# Patient Record
Sex: FEMALE | Race: BLACK OR AFRICAN AMERICAN | Employment: OTHER | URBAN - METROPOLITAN AREA
[De-identification: names, ages, dates, MRNs, and addresses within clinical notes are randomized per-mention and may not be internally consistent; named-entity substitution may affect disease eponyms.]

---

## 2017-03-15 ENCOUNTER — OFFICE VISIT (OUTPATIENT)
Dept: FAMILY MEDICINE CLINIC | Age: 67
End: 2017-03-15

## 2017-03-15 VITALS
TEMPERATURE: 96 F | BODY MASS INDEX: 37.56 KG/M2 | RESPIRATION RATE: 16 BRPM | WEIGHT: 220 LBS | HEIGHT: 64 IN | HEART RATE: 82 BPM | DIASTOLIC BLOOD PRESSURE: 95 MMHG | SYSTOLIC BLOOD PRESSURE: 172 MMHG | OXYGEN SATURATION: 98 %

## 2017-03-15 DIAGNOSIS — J32.0 LEFT MAXILLARY SINUSITIS: Primary | ICD-10-CM

## 2017-03-15 RX ORDER — VALSARTAN AND HYDROCHLOROTHIAZIDE 80; 12.5 MG/1; MG/1
1 TABLET, FILM COATED ORAL 2 TIMES DAILY
Qty: 60 TAB | Refills: 5 | Status: SHIPPED | OUTPATIENT
Start: 2017-03-15 | End: 2018-06-14 | Stop reason: DRUGHIGH

## 2017-03-15 RX ORDER — AZITHROMYCIN 250 MG/1
TABLET, FILM COATED ORAL
Qty: 6 TAB | Refills: 0 | Status: SHIPPED | OUTPATIENT
Start: 2017-03-15 | End: 2017-03-20

## 2017-03-15 NOTE — MR AVS SNAPSHOT
Visit Information Date & Time Provider Department Dept. Phone Encounter #  
 3/15/2017 10:30 AM Torsten Dalton, [address] [id number] Follow-up Instructions Return if symptoms worsen or fail to improve. Your Appointments 3/30/2017  8:15 AM  
Follow Up with Torsten Dalton MD  
[facility] ([address]) Appt Note: 6 mo f/u  
 Janelleuth Trae. [address]  
  
   
 [address] Upcoming Health Maintenance Date Due DTaP/Tdap/Td series (1 - Tdap) 7/3/1973 Pneumococcal 19-64 Highest Risk (2 of 3 - PCV13) 11/17/2016 PAP AKA CERVICAL CYTOLOGY 10/15/2047* MEDICARE YEARLY EXAM 11/9/2017 BREAST CANCER SCRN MAMMOGRAM 10/13/2018 COLONOSCOPY 11/4/2024 *Topic was postponed. The date shown is not the original due date. Allergies as of 3/15/2017  Review Complete On: 3/15/2017 By: Torsten Dalton MD  
  
 Severity Noted Reaction Type Reactions Codeine  09/08/2016    Other (comments) \"Feel funny\" Penicillin G  09/08/2016    Hives Current Immunizations  Reviewed on 9/29/2016 Name Date Influenza Vaccine (Quad) PF 9/29/2016 Pneumococcal Polysaccharide (PPSV-23) 11/17/2015 Zoster Vaccine, Live 6/11/2013 Not reviewed this visit You Were Diagnosed With   
  
 Codes Comments Left maxillary sinusitis    -  Primary ICD-10-CM: J32.0 ICD-9-CM: 473.0 Vitals BP Pulse Temp Resp Height(growth percentile) Weight(growth percentile) (!) 172/95 82 96 °F (35.6 °C) (Oral) 16 5' 4\" (1.626 m) 220 lb (99.8 kg) SpO2 BMI OB Status Smoking Status 98% 37.76 kg/m2 Hysterectomy Never Smoker BMI and BSA Data Body Mass Index Body Surface Area  
 37.76 kg/m 2 2.12 m 2 Preferred Pharmacy Pharmacy Name Phone  [address], [address] [phone] Your Updated Medication List  
  
   
This list is accurate as of: 3/15/17 11:12 AM.  Always use your most recent med list.  
  
  
  
  
 AYR SALINE 0.65 % nasal spray Generic drug:  sodium chloride 1 Spray as needed for Congestion. azithromycin 250 mg tablet Commonly known as:  Valaria Wernerins Use as per package directions CALCIUM 600 + D 600-125 mg-unit Tab Generic drug:  calcium-cholecalciferol (d3) Take 600 mg by mouth daily. COMBIGAN 0.2-0.5 % Drop ophthalmic solution Generic drug:  brimonidine-timolol Administer 1 Drop to left eye daily. folic acid 1 mg tablet Commonly known as:  Google Take  by mouth daily. guaiFENesin-codeine 100-10 mg/5 mL solution Commonly known as:  ROBITUSSIN AC Take 5 mL by mouth three (3) times daily as needed for Cough. Max Daily Amount: 15 mL. STOOL SOFTENER 100 mg capsule Generic drug:  docusate sodium Take 100 mg by mouth daily. tiZANidine 2 mg tablet Commonly known as:  Twila Blue Take 1 tablet 3 times a day as needed TYLENOL EXTRA STRENGTH 500 mg tablet Generic drug:  acetaminophen Take 500 mg by mouth every four (4) hours as needed for Pain.  
  
 valsartan-hydroCHLOROthiazide 80-12.5 mg per tablet Commonly known as:  DIOVAN-HCT Take 1 Tab by mouth two (2) times a day. VENTOLIN HFA 90 mcg/actuation inhaler Generic drug:  albuterol Take 2 Puffs by inhalation every four (4) hours as needed for Wheezing. XARELTO 20 mg Tab tablet Generic drug:  rivaroxaban Take  by mouth daily. ZANTAC 150 mg tablet Generic drug:  raNITIdine Take  by mouth as needed for Indigestion. ZyrTEC 10 mg Cap Generic drug:  Cetirizine Take 10 mg by mouth as needed. Prescriptions Sent to Pharmacy Refills  
 azithromycin (ZITHROMAX Z-MARYANN) 250 mg tablet 0 Sig: Use as per package directions  Class: Normal  
 Pharmacy: [id number] [address] [place] [address] Ph #: [phone]  
 valsartan-hydroCHLOROthiazide (DIOVAN-HCT) 80-12.5 mg per tablet 5 Sig: Take 1 Tab by mouth two (2) times a day. Class: Normal  
 Pharmacy: [id number] [address] [place] [address] Ph #: [phone] Route: Oral  
  
Follow-up Instructions Return if symptoms worsen or fail to improve. Patient Instructions Please take some Afrin nasal spray (oxymetolazone) for 5 days twice per day. Sinusitis: Care Instructions Your Care Instructions Sinusitis is an infection of the lining of the sinus cavities in your head. Sinusitis often follows a cold. It causes pain and pressure in your head and face. In most cases, sinusitis gets better on its own in 1 to 2 weeks. But some mild symptoms may last for several weeks. Sometimes antibiotics are needed. Follow-up care is a key part of your treatment and safety. Be sure to make and go to all appointments, and call your doctor if you are having problems. It's also a good idea to know your test results and keep a list of the medicines you take. How can you care for yourself at home? · Take an over-the-counter pain medicine, such as acetaminophen (Tylenol), ibuprofen (Advil, Motrin), or naproxen (Aleve). Read and follow all instructions on the label. · If the doctor prescribed antibiotics, take them as directed. Do not stop taking them just because you feel better. You need to take the full course of antibiotics. · Be careful when taking over-the-counter cold or flu medicines and Tylenol at the same time. Many of these medicines have acetaminophen, which is Tylenol. Read the labels to make sure that you are not taking more than the recommended dose. Too much acetaminophen (Tylenol) can be harmful.  
· Breathe warm, moist air from a steamy shower, a hot bath, or a sink filled with hot water. Avoid cold, dry air. Using a humidifier in your home may help. Follow the directions for cleaning the machine. · Use saline (saltwater) nasal washes to help keep your nasal passages open and wash out mucus and bacteria. You can buy saline nose drops at a grocery store or drugstore. Or you can make your own at home by adding 1 teaspoon of salt and 1 teaspoon of baking soda to 2 cups of distilled water. If you make your own, fill a bulb syringe with the solution, insert the tip into your nostril, and squeeze gently. Sidonie Bonnie your nose. · Put a hot, wet towel or a warm gel pack on your face 3 or 4 times a day for 5 to 10 minutes each time. · Try a decongestant nasal spray like oxymetazoline (Afrin). Do not use it for more than 3 days in a row. Using it for more than 3 days can make your congestion worse. When should you call for help? Call your doctor now or seek immediate medical care if: 
· You have new or worse swelling or redness in your face or around your eyes. · You have a new or higher fever. Watch closely for changes in your health, and be sure to contact your doctor if: 
· You have new or worse facial pain. · The mucus from your nose becomes thicker (like pus) or has new blood in it. · You are not getting better as expected. Where can you learn more? Go to http://diego-beatriz.info/. Enter V938 in the search box to learn more about \"Sinusitis: Care Instructions. \" Current as of: July 29, 2016 Content Version: 11.1 © 7466-9836 [employer]. Care instructions adapted under license by [employer] (which disclaims liability or warranty for this information). If you have questions about a medical condition or this instruction, always ask your healthcare professional. [address]  any warranty or liability for your use of this information. Introducing [address]! Dear Sherryle Fire: Thank you for requesting a [employer] account. Our records indicate that you already have an active [employer] account. You can access your account anytime at [email or web address] Did you know that you can access your hospital and ER discharge instructions at any time in [employer]? You can also review all of your test results from your hospital stay or ER visit. Additional Information If you have questions, please visit the Frequently Asked Questions section of the [employer] website at [email or web address] Remember, [employer] is NOT to be used for urgent needs. For medical emergencies, dial 911. Now available from your iPhone and Android! Please provide this summary of care documentation to your next provider. Your primary care clinician is listed as JCARLOS Javier. If you have any questions after today's visit, please call [phone].

## 2017-03-15 NOTE — PROGRESS NOTES
1. Have you been to the ER, urgent care clinic since your last visit? Hospitalized since your last visit? No.     2. Have you seen or consulted any other health care providers outside of the [address] [place] [address] since your last visit? Include any pap smears or colon screening.  No.    Patient presents with wheezing and severe sinus pressure on her left forehead and left eye for about a month

## 2017-03-15 NOTE — PATIENT INSTRUCTIONS
Please take some Afrin nasal spray (oxymetolazone) for 5 days twice per day. Sinusitis: Care Instructions  Your Care Instructions    Sinusitis is an infection of the lining of the sinus cavities in your head. Sinusitis often follows a cold. It causes pain and pressure in your head and face. In most cases, sinusitis gets better on its own in 1 to 2 weeks. But some mild symptoms may last for several weeks. Sometimes antibiotics are needed. Follow-up care is a key part of your treatment and safety. Be sure to make and go to all appointments, and call your doctor if you are having problems. It's also a good idea to know your test results and keep a list of the medicines you take. How can you care for yourself at home? · Take an over-the-counter pain medicine, such as acetaminophen (Tylenol), ibuprofen (Advil, Motrin), or naproxen (Aleve). Read and follow all instructions on the label. · If the doctor prescribed antibiotics, take them as directed. Do not stop taking them just because you feel better. You need to take the full course of antibiotics. · Be careful when taking over-the-counter cold or flu medicines and Tylenol at the same time. Many of these medicines have acetaminophen, which is Tylenol. Read the labels to make sure that you are not taking more than the recommended dose. Too much acetaminophen (Tylenol) can be harmful. · Breathe warm, moist air from a steamy shower, a hot bath, or a sink filled with hot water. Avoid cold, dry air. Using a humidifier in your home may help. Follow the directions for cleaning the machine. · Use saline (saltwater) nasal washes to help keep your nasal passages open and wash out mucus and bacteria. You can buy saline nose drops at a grocery store or drugstore. Or you can make your own at home by adding 1 teaspoon of salt and 1 teaspoon of baking soda to 2 cups of distilled water.  If you make your own, fill a bulb syringe with the solution, insert the tip into your nostril, and squeeze gently. Melvia Creamer your nose. · Put a hot, wet towel or a warm gel pack on your face 3 or 4 times a day for 5 to 10 minutes each time. · Try a decongestant nasal spray like oxymetazoline (Afrin). Do not use it for more than 3 days in a row. Using it for more than 3 days can make your congestion worse. When should you call for help? Call your doctor now or seek immediate medical care if:  · You have new or worse swelling or redness in your face or around your eyes. · You have a new or higher fever. Watch closely for changes in your health, and be sure to contact your doctor if:  · You have new or worse facial pain. · The mucus from your nose becomes thicker (like pus) or has new blood in it. · You are not getting better as expected. Where can you learn more? Go to http://diego-beatriz.info/. Enter C043 in the search box to learn more about \"Sinusitis: Care Instructions. \"  Current as of: July 29, 2016  Content Version: 11.1  © 2748-1005 [employer]. Care instructions adapted under license by [employer] (which disclaims liability or warranty for this information). If you have questions about a medical condition or this instruction, always ask your healthcare professional. [address] any warranty or liability for your use of this information.

## 2017-03-30 ENCOUNTER — OFFICE VISIT (OUTPATIENT)
Dept: FAMILY MEDICINE CLINIC | Age: 67
End: 2017-03-30

## 2017-03-30 VITALS
OXYGEN SATURATION: 99 % | DIASTOLIC BLOOD PRESSURE: 86 MMHG | BODY MASS INDEX: 37.73 KG/M2 | TEMPERATURE: 97 F | HEIGHT: 64 IN | SYSTOLIC BLOOD PRESSURE: 157 MMHG | HEART RATE: 80 BPM | WEIGHT: 221 LBS | RESPIRATION RATE: 16 BRPM

## 2017-03-30 DIAGNOSIS — E66.9 OBESITY (BMI 30-39.9): ICD-10-CM

## 2017-03-30 DIAGNOSIS — D86.9 SARCOIDOSIS: Primary | ICD-10-CM

## 2017-03-30 DIAGNOSIS — Z86.718 PERSONAL HISTORY OF DVT (DEEP VEIN THROMBOSIS): ICD-10-CM

## 2017-03-30 RX ORDER — ALBUTEROL SULFATE 90 UG/1
2 AEROSOL, METERED RESPIRATORY (INHALATION)
Qty: 1 INHALER | Refills: 2 | Status: SHIPPED | OUTPATIENT
Start: 2017-03-30 | End: 2018-06-14 | Stop reason: SDUPTHER

## 2017-03-30 RX ORDER — AMLODIPINE BESYLATE 5 MG/1
5 TABLET ORAL DAILY
Qty: 30 TAB | Refills: 5 | Status: SHIPPED | OUTPATIENT
Start: 2017-03-30 | End: 2017-10-12 | Stop reason: ALTCHOICE

## 2017-03-30 NOTE — PROGRESS NOTES
Mat Davis is a 61 y.o. female and presents with Osteopenia; Blood Clot; and Sarcoidosis       Subjective:    HM- had pneumovax 23 already. TDAP also due  HTN- taking meds. No chest pain or sob. Sarcoidosis- some wheezing. Not using albuterol. Obesity- not working much on this. Assessment/Plan:      1. HM- will plan to do Prevnar 13 at next visit after pt turns 74. -TDAP at local pharmacy to be sure it is covered. 2. Obesity- encouraged to continue work on this. Caloric restriction to 1 lb/week. 3. Sarcoidosis- recent CT with somewhat worse thickening of fibrosis but no new areas- referral to Pulmonary (Dr. Nara Lauren from the past) also PFT's ordered. Discussed use of rescue inhaler. Seems to have upper airway sx primarily today- no wheezes on exam.   4. HTN- not controlled. Adding Norvasc. DASH diet given       RTC in 1 month   Orders Placed This Encounter    REFERRAL TO PULMONARY DISEASE     Referral Priority:   Routine     Referral Type:   Consultation     Referral Reason:   Specialty Services Required    PULMONARY FUNCTION TEST     Standing Status:   Future     Standing Expiration Date:   9/30/2017     Scheduling Instructions: With and without bronchodilator. Pt requests [facility]    albuterol (PROVENTIL HFA, VENTOLIN HFA, PROAIR HFA) 90 mcg/actuation inhaler     Sig: Take 2 Puffs by inhalation every six (6) hours as needed for Wheezing. Dispense:  1 Inhaler     Refill:  2    amLODIPine (NORVASC) 5 mg tablet     Sig: Take 1 Tab by mouth daily. Dispense:  27 Tab     Refill:  5   Latosha was seen today for osteopenia, blood clot and sarcoidosis. Diagnoses and all orders for this visit:    Sarcoidosis ([facility] [address]  -     albuterol (PROVENTIL HFA, VENTOLIN HFA, PROAIR HFA) 90 mcg/actuation inhaler; Take 2 Puffs by inhalation every six (6) hours as needed for Wheezing.  -     PULMONARY FUNCTION TEST; Future  -     REFERRAL TO PULMONARY DISEASE    Obesity (BMI 30-39. 9)    Personal history of DVT (deep vein thrombosis)    Other orders  -     amLODIPine (NORVASC) 5 mg tablet; Take 1 Tab by mouth daily. ROS:  Negative except as mentioned above  Cardiac- no chest pain or palpitations  Pulmonary- no sob or wheezes  GI- no n/v or diarrhea. SH:  Social History   Substance Use Topics    Smoking status: Never Smoker    Smokeless tobacco: None    Alcohol use No         Medications/Allergies:  Current Outpatient Prescriptions on File Prior to Visit   Medication Sig Dispense Refill    valsartan-hydroCHLOROthiazide (DIOVAN-HCT) 80-12.5 mg per tablet Take 1 Tab by mouth two (2) times a day. 60 Tab 5    guaiFENesin-codeine (ROBITUSSIN AC) 100-10 mg/5 mL solution Take 5 mL by mouth three (3) times daily as needed for Cough. Max Daily Amount: 15 mL. 180 mL 0    acetaminophen (TYLENOL EXTRA STRENGTH) 500 mg tablet Take 500 mg by mouth every four (4) hours as needed for Pain.  rivaroxaban (XARELTO) 20 mg tab tablet Take  by mouth daily.  folic acid (FOLVITE) 1 mg tablet Take  by mouth daily.  albuterol (VENTOLIN HFA) 90 mcg/actuation inhaler Take 2 Puffs by inhalation every four (4) hours as needed for Wheezing.  brimonidine-timolol (COMBIGAN) 0.2-0.5 % drop ophthalmic solution Administer 1 Drop to left eye daily.  ranitidine (ZANTAC) 150 mg tablet Take  by mouth as needed for Indigestion.  Cetirizine (ZYRTEC) 10 mg cap Take 10 mg by mouth as needed.  calcium-cholecalciferol, d3, (CALCIUM 600 + D) 600-125 mg-unit tab Take 600 mg by mouth daily.  docusate sodium (STOOL SOFTENER) 100 mg capsule Take 100 mg by mouth daily.  sodium chloride (AYR SALINE) 0.65 % nasal spray 1 Spray as needed for Congestion.  tiZANidine (ZANAFLEX) 2 mg tablet Take 1 tablet 3 times a day as needed 30 Tab 0     No current facility-administered medications on file prior to visit.            Allergies   Allergen Reactions    Codeine Other (comments)     \"Feel funny\"    Penicillin NATASHA Shea       Objective:  Visit Vitals    /86    Pulse 80    Temp 97 °F (36.1 °C) (Oral)    Resp 16    Ht 5' 4\" (1.626 m)    Wt 221 lb (100.2 kg)    SpO2 99%    BMI 37.93 kg/m2    Body mass index is 37.93 kg/(m^2). Constitutional: Well developed, nourished, no distress, alert, obese   CV: S1, S2.  RRR. No murmurs/rubs. No thrills palpated. No carotid bruits. Intact distal pulses. No edema. Pulm: No abnormalities on inspection. Clear to auscultation bilaterally. No wheezing/rhonchi. Normal effort. GI: Soft, nontender, nondistended. Normal active bowel sounds. No  masses on palpation. No hepatosplenomegaly.

## 2017-03-30 NOTE — MR AVS SNAPSHOT
Visit Information Date & Time Provider Department Dept. Phone Encounter #  
 3/30/2017  8:15 AM Blaze Khan, [address] [facility] [phone] [id number] Follow-up Instructions Return in about 4 weeks (around 4/27/2017). Upcoming Health Maintenance Date Due DTaP/Tdap/Td series (1 - Tdap) 7/3/1973 Pneumococcal 19-64 Highest Risk (2 of 3 - PCV13) 11/17/2016 PAP AKA CERVICAL CYTOLOGY 10/15/2047* MEDICARE YEARLY EXAM 11/9/2017 BREAST CANCER SCRN MAMMOGRAM 10/13/2018 COLONOSCOPY 11/4/2024 *Topic was postponed. The date shown is not the original due date. Allergies as of 3/30/2017  Review Complete On: 3/30/2017 By: Blaze Khan MD  
  
 Severity Noted Reaction Type Reactions Codeine  09/08/2016    Other (comments) \"Feel funny\" Penicillin G  09/08/2016    Hives Current Immunizations  Reviewed on 3/30/2017 Name Date Influenza Vaccine (Quad) PF 9/29/2016 Pneumococcal Polysaccharide (PPSV-23) 11/17/2015 Zoster Vaccine, Live 6/11/2013 Reviewed by Blaze Khan MD on 3/30/2017 at  8:36 AM  
You Were Diagnosed With   
  
 Codes Comments Sarcoidosis ([facility].)    -  Primary ICD-10-CM: S43.9 ICD-9-CM: 135 Obesity (BMI 30-39. 9)     ICD-10-CM: E66.9 ICD-9-CM: 278.00 Personal history of DVT (deep vein thrombosis)     ICD-10-CM: W13.332 ICD-9-CM: V12.51 Vitals BP Pulse Temp Resp Height(growth percentile) Weight(growth percentile) 157/86 80 97 °F (36.1 °C) (Oral) 16 5' 4\" (1.626 m) 221 lb (100.2 kg) SpO2 BMI OB Status Smoking Status 99% 37.93 kg/m2 Hysterectomy Never Smoker Vitals History BMI and BSA Data Body Mass Index Body Surface Area  
 37.93 kg/m 2 2.13 m 2 Preferred Pharmacy Pharmacy Name Phone [facility] [address] [phone] Your Updated Medication List  
  
   
 This list is accurate as of: 3/30/17  9:01 AM.  Always use your most recent med list.  
  
  
  
  
 albuterol 90 mcg/actuation inhaler Commonly known as:  PROVENTIL HFA, VENTOLIN HFA, PROAIR HFA Take 2 Puffs by inhalation every six (6) hours as needed for Wheezing. amLODIPine 5 mg tablet Commonly known as:  Joselo Presser Take 1 Tab by mouth daily. AYR SALINE 0.65 % nasal spray Generic drug:  sodium chloride 1 Spray as needed for Congestion. CALCIUM 600 + D 600-125 mg-unit Tab Generic drug:  calcium-cholecalciferol (d3) Take 600 mg by mouth daily. COMBIGAN 0.2-0.5 % Drop ophthalmic solution Generic drug:  brimonidine-timolol Administer 1 Drop to left eye daily. folic acid 1 mg tablet Commonly known as:  Google Take  by mouth daily. STOOL SOFTENER 100 mg capsule Generic drug:  docusate sodium Take 100 mg by mouth daily. tiZANidine 2 mg tablet Commonly known as:  Lamont Sleek Take 1 tablet 3 times a day as needed TYLENOL EXTRA STRENGTH 500 mg tablet Generic drug:  acetaminophen Take 500 mg by mouth every four (4) hours as needed for Pain.  
  
 valsartan-hydroCHLOROthiazide 80-12.5 mg per tablet Commonly known as:  DIOVAN-HCT Take 1 Tab by mouth two (2) times a day. XARELTO 20 mg Tab tablet Generic drug:  rivaroxaban Take  by mouth daily. ZANTAC 150 mg tablet Generic drug:  raNITIdine Take  by mouth as needed for Indigestion. ZyrTEC 10 mg Cap Generic drug:  Cetirizine Take 10 mg by mouth as needed. Prescriptions Sent to Pharmacy Refills  
 albuterol (PROVENTIL HFA, VENTOLIN HFA, PROAIR HFA) 90 mcg/actuation inhaler 2 Sig: Take 2 Puffs by inhalation every six (6) hours as needed for Wheezing. Class: Normal  
 Pharmacy: [id number] [place] [address] Ph #: [phone]  Route: Inhalation  
 amLODIPine (NORVASC) 5 mg tablet 5  
 Sig: Take 1 Tab by mouth daily. Class: Normal  
 Pharmacy: [id number] [address] Ph #: [phone] Route: Oral  
  
We Performed the Following REFERRAL TO PULMONARY DISEASE [NMX82 Custom] Comments:  
 Please evaluate patient for sarcoidosis Follow-up Instructions Return in about 4 weeks (around 4/27/2017). To-Do List   
 03/30/2017 PFT:  PULMONARY FUNCTION TEST Referral Information Referral ID Referred By Referred To  
  
 [id number] ADALID Pollock Not Available Visits Status Start Date End Date 1 New Request 3/30/17 3/30/18 If your referral has a status of pending review or denied, additional information will be sent to support the outcome of this decision. Patient Instructions Please ask your local pharmacy for a TDAP (tetanus with whooping cough) booster. DASH Diet: Care Instructions Your Care Instructions The DASH diet is an eating plan that can help lower your blood pressure. DASH stands for Dietary Approaches to Stop Hypertension. Hypertension is high blood pressure. The DASH diet focuses on eating foods that are high in calcium, potassium, and magnesium. These nutrients can lower blood pressure. The foods that are highest in these nutrients are fruits, vegetables, low-fat dairy products, nuts, seeds, and legumes. But taking calcium, potassium, and magnesium supplements instead of eating foods that are high in those nutrients does not have the same effect. The DASH diet also includes whole grains, fish, and poultry. The DASH diet is one of several lifestyle changes your doctor may recommend to lower your high blood pressure. Your doctor may also want you to decrease the amount of sodium in your diet. Lowering sodium while following the DASH diet can lower blood pressure even further than just the DASH diet alone. Follow-up care is a key part of your treatment and safety.  Be sure to make and go to all appointments, and call your doctor if you are having problems. It's also a good idea to know your test results and keep a list of the medicines you take. How can you care for yourself at home? Following the DASH diet · Eat 4 to 5 servings of fruit each day. A serving is 1 medium-sized piece of fruit, ½ cup chopped or canned fruit, 1/4 cup dried fruit, or 4 ounces (½ cup) of fruit juice. Choose fruit more often than fruit juice. · Eat 4 to 5 servings of vegetables each day. A serving is 1 cup of lettuce or raw leafy vegetables, ½ cup of chopped or cooked vegetables, or 4 ounces (½ cup) of vegetable juice. Choose vegetables more often than vegetable juice. · Get 2 to 3 servings of low-fat and fat-free dairy each day. A serving is 8 ounces of milk, 1 cup of yogurt, or 1 ½ ounces of cheese. · Eat 6 to 8 servings of grains each day. A serving is 1 slice of bread, 1 ounce of dry cereal, or ½ cup of cooked rice, pasta, or cooked cereal. Try to choose whole-grain products as much as possible. · Limit lean meat, poultry, and fish to 2 servings each day. A serving is 3 ounces, about the size of a deck of cards. · Eat 4 to 5 servings of nuts, seeds, and legumes (cooked dried beans, lentils, and split peas) each week. A serving is 1/3 cup of nuts, 2 tablespoons of seeds, or ½ cup of cooked beans or peas. · Limit fats and oils to 2 to 3 servings each day. A serving is 1 teaspoon of vegetable oil or 2 tablespoons of salad dressing. · Limit sweets and added sugars to 5 servings or less a week. A serving is 1 tablespoon jelly or jam, ½ cup sorbet, or 1 cup of lemonade. · Eat less than 2,300 milligrams (mg) of sodium a day. If you limit your sodium to 1,500 mg a day, you can lower your blood pressure even more. Tips for success · Start small. Do not try to make dramatic changes to your diet all at once.  You might feel that you are missing out on your favorite foods and then be more likely to not follow the plan. Make small changes, and stick with them. Once those changes become habit, add a few more changes. · Try some of the following: ¨ Make it a goal to eat a fruit or vegetable at every meal and at snacks. This will make it easy to get the recommended amount of fruits and vegetables each day. ¨ Try yogurt topped with fruit and nuts for a snack or healthy dessert. ¨ Add lettuce, tomato, cucumber, and onion to sandwiches. ¨ Combine a ready-made pizza crust with low-fat mozzarella cheese and lots of vegetable toppings. Try using tomatoes, squash, spinach, broccoli, carrots, cauliflower, and onions. ¨ Have a variety of cut-up vegetables with a low-fat dip as an appetizer instead of chips and dip. ¨ Sprinkle sunflower seeds or chopped almonds over salads. Or try adding chopped walnuts or almonds to cooked vegetables. ¨ Try some vegetarian meals using beans and peas. Add garbanzo or kidney beans to salads. Make burritos and tacos with mashed guzman beans or black beans. Where can you learn more? Go to http://diego-beatriz.info/. Enter F993 in the search box to learn more about \"DASH Diet: Care Instructions. \" Current as of: March 23, 2016 Content Version: 11.2 © 3410-2529 Realm. Care instructions adapted under license by Daishu.[email or web address] (which disclaims liability or warranty for this information). If you have questions about a medical condition or this instruction, always ask your healthcare professional. [place] [address] any warranty or liability for your use of this information. Mediterranean Diet: Care Instructions Your Care Instructions The Mediterranean diet features foods eaten in [place], [place], [place] and [place], and other countries that border the [facility]. It emphasizes eating a diet rich in fruits, vegetables, nuts, and high-fiber grains, and limits meat, cheese, and sweets. The Mediterranean diet may: · Prevent heart disease and lower the risk of a heart attack or stroke. · Prevent type 2 diabetes. · Prevent Alzheimer's disease and other dementia. · Prevent depression. · Prevent Parkinson's disease. This diet contains more fat than other heart-healthy diets. But the fats are mainly from nuts, unsaturated oils, such as fish oils, olive oil, and certain nut or seed oils (such as canola, soybean, or flaxseed oil). These types of oils may help protect the heart and blood vessels. Follow-up care is a key part of your treatment and safety. Be sure to make and go to all appointments, and call your doctor if you are having problems. It's also a good idea to know your test results and keep a list of the medicines you take. How can you care for yourself at home? What to eat · Eat a variety of fruits and vegetables each day, such as grapes, blueberries, tomatoes, broccoli, peppers, figs, olives, spinach, eggplant, beans, lentils, and chickpeas. · Eat a variety of whole-grain foods each day, such as oats, brown rice, and whole wheat bread, pasta, and couscous. · Eat fish at least 2 times a week. Try tuna, salmon, mackerel, lake trout, herring, or sardines. · Eat moderate amounts of low-fat dairy products, such as milk, cheese, or yogurt. · Eat moderate amounts of poultry and eggs. · Choose healthy (unsaturated) fats, such as nuts, olive oil and certain nut or seed oils like canola, soybean, and flaxseed. · Limit unhealthy (saturated) fats, such as butter, palm oil, and coconut oil. And limit fats found in animal products, such as meat and dairy products made with whole milk. Try to eat red meat only a few times a month in very small amounts. · Limit sweets and desserts to only a few times a week. This includes sugar-sweetened drinks like soda. The Mediterranean diet may also include red wine with your meal1 glass each day for women and up to 2 glasses a day for men. Tips for changing your diet · Dip bread in a mix of olive oil and fresh herbs instead of using butter. · Add avocado slices to your sandwich instead of pham. · Have fish for lunch or dinner instead of red meat. Brush the fish with olive oil, and broil or grill it. · Sprinkle your salad with seeds or nuts instead of cheese. · Cook with olive or canola oil instead of butter or oils that are high in saturated fat. · Switch from 2% milk or whole milk to 1% or fat-free milk. · Dip raw vegetables in a vinaigrette dressing or hummus instead of dips made from mayonnaise or sour cream. 
· Have a piece of fruit for dessert instead of a piece of cake. Try baked apples, or have some dried fruit. Part of the Mediterranean diet is being active. Get at least 30 minutes of exercise on most days of the week. Walking is a good choice. You also may want to do other activities, such as running, swimming, cycling, or playing tennis or team sports. Where can you learn more? Go to http://diego-beatriz.info/. Enter O407 in the search box to learn more about \"Mediterranean Diet: Care Instructions. \" Current as of: October 21, 2016 Content Version: 11.2 © 3544-1653 Guidesly, Best Bid. Care instructions adapted under license by BioGener[employer] (which disclaims liability or warranty for this information). If you have questions about a medical condition or this instruction, always ask your healthcare professional. [place] [address] any warranty or liability for your use of this information. Introducing [facility]! Dear Naomi Landeros: Thank you for requesting a Drip In account. Our records indicate that you already have an active Drip In account. You can access your account anytime at [email or web address] Did you know that you can access your hospital and ER discharge instructions at any time in Drip In?   You can also review all of your test results from your hospital stay or ER visit. Additional Information If you have questions, please visit the Frequently Asked Questions section of the [employer] website at [email or web address] Remember, [employer] is NOT to be used for urgent needs. For medical emergencies, dial 911. Now available from your iPhone and Android! Please provide this summary of care documentation to your next provider. Your primary care clinician is listed as JCARLOS Javier. If you have any questions after today's visit, please call [phone].

## 2017-03-30 NOTE — PATIENT INSTRUCTIONS
Please ask your local pharmacy for a TDAP (tetanus with whooping cough) booster. DASH Diet: Care Instructions  Your Care Instructions  The DASH diet is an eating plan that can help lower your blood pressure. DASH stands for Dietary Approaches to Stop Hypertension. Hypertension is high blood pressure. The DASH diet focuses on eating foods that are high in calcium, potassium, and magnesium. These nutrients can lower blood pressure. The foods that are highest in these nutrients are fruits, vegetables, low-fat dairy products, nuts, seeds, and legumes. But taking calcium, potassium, and magnesium supplements instead of eating foods that are high in those nutrients does not have the same effect. The DASH diet also includes whole grains, fish, and poultry. The DASH diet is one of several lifestyle changes your doctor may recommend to lower your high blood pressure. Your doctor may also want you to decrease the amount of sodium in your diet. Lowering sodium while following the DASH diet can lower blood pressure even further than just the DASH diet alone. Follow-up care is a key part of your treatment and safety. Be sure to make and go to all appointments, and call your doctor if you are having problems. It's also a good idea to know your test results and keep a list of the medicines you take. How can you care for yourself at home? Following the DASH diet  · Eat 4 to 5 servings of fruit each day. A serving is 1 medium-sized piece of fruit, ½ cup chopped or canned fruit, 1/4 cup dried fruit, or 4 ounces (½ cup) of fruit juice. Choose fruit more often than fruit juice. · Eat 4 to 5 servings of vegetables each day. A serving is 1 cup of lettuce or raw leafy vegetables, ½ cup of chopped or cooked vegetables, or 4 ounces (½ cup) of vegetable juice. Choose vegetables more often than vegetable juice. · Get 2 to 3 servings of low-fat and fat-free dairy each day.  A serving is 8 ounces of milk, 1 cup of yogurt, or 1 ½ ounces of cheese. · Eat 6 to 8 servings of grains each day. A serving is 1 slice of bread, 1 ounce of dry cereal, or ½ cup of cooked rice, pasta, or cooked cereal. Try to choose whole-grain products as much as possible. · Limit lean meat, poultry, and fish to 2 servings each day. A serving is 3 ounces, about the size of a deck of cards. · Eat 4 to 5 servings of nuts, seeds, and legumes (cooked dried beans, lentils, and split peas) each week. A serving is 1/3 cup of nuts, 2 tablespoons of seeds, or ½ cup of cooked beans or peas. · Limit fats and oils to 2 to 3 servings each day. A serving is 1 teaspoon of vegetable oil or 2 tablespoons of salad dressing. · Limit sweets and added sugars to 5 servings or less a week. A serving is 1 tablespoon jelly or jam, ½ cup sorbet, or 1 cup of lemonade. · Eat less than 2,300 milligrams (mg) of sodium a day. If you limit your sodium to 1,500 mg a day, you can lower your blood pressure even more. Tips for success  · Start small. Do not try to make dramatic changes to your diet all at once. You might feel that you are missing out on your favorite foods and then be more likely to not follow the plan. Make small changes, and stick with them. Once those changes become habit, add a few more changes. · Try some of the following:  ¨ Make it a goal to eat a fruit or vegetable at every meal and at snacks. This will make it easy to get the recommended amount of fruits and vegetables each day. ¨ Try yogurt topped with fruit and nuts for a snack or healthy dessert. ¨ Add lettuce, tomato, cucumber, and onion to sandwiches. ¨ Combine a ready-made pizza crust with low-fat mozzarella cheese and lots of vegetable toppings. Try using tomatoes, squash, spinach, broccoli, carrots, cauliflower, and onions. ¨ Have a variety of cut-up vegetables with a low-fat dip as an appetizer instead of chips and dip. ¨ Sprinkle sunflower seeds or chopped almonds over salads.  Or try adding chopped walnuts or almonds to cooked vegetables. ¨ Try some vegetarian meals using beans and peas. Add garbanzo or kidney beans to salads. Make burritos and tacos with mashed guzman beans or black beans. Where can you learn more? Go to http://diego-beatriz.info/. Enter M280 in the search box to learn more about \"DASH Diet: Care Instructions. \"  Current as of: March 23, 2016  Content Version: 11.2  © 5815-4732 Lastline. Care instructions adapted under license by Par[employer] (which disclaims liability or warranty for this information). If you have questions about a medical condition or this instruction, always ask your healthcare professional. [address] any warranty or liability for your use of this information. Mediterranean Diet: Care Instructions  Your Care Instructions  The Mediterranean diet features foods eaten in [place], [place], [place] and [place], and other countries that border the [facility]. It emphasizes eating a diet rich in fruits, vegetables, nuts, and high-fiber grains, and limits meat, cheese, and sweets. The Mediterranean diet may:  · Prevent heart disease and lower the risk of a heart attack or stroke. · Prevent type 2 diabetes. · Prevent Alzheimer's disease and other dementia. · Prevent depression. · Prevent Parkinson's disease. This diet contains more fat than other heart-healthy diets. But the fats are mainly from nuts, unsaturated oils, such as fish oils, olive oil, and certain nut or seed oils (such as canola, soybean, or flaxseed oil). These types of oils may help protect the heart and blood vessels. Follow-up care is a key part of your treatment and safety. Be sure to make and go to all appointments, and call your doctor if you are having problems. It's also a good idea to know your test results and keep a list of the medicines you take. How can you care for yourself at home?   What to eat  · Eat a variety of fruits and vegetables each day, such as grapes, blueberries, tomatoes, broccoli, peppers, figs, olives, spinach, eggplant, beans, lentils, and chickpeas. · Eat a variety of whole-grain foods each day, such as oats, brown rice, and whole wheat bread, pasta, and couscous. · Eat fish at least 2 times a week. Try tuna, salmon, mackerel, lake trout, herring, or sardines. · Eat moderate amounts of low-fat dairy products, such as milk, cheese, or yogurt. · Eat moderate amounts of poultry and eggs. · Choose healthy (unsaturated) fats, such as nuts, olive oil and certain nut or seed oils like canola, soybean, and flaxseed. · Limit unhealthy (saturated) fats, such as butter, palm oil, and coconut oil. And limit fats found in animal products, such as meat and dairy products made with whole milk. Try to eat red meat only a few times a month in very small amounts. · Limit sweets and desserts to only a few times a week. This includes sugar-sweetened drinks like soda. The Mediterranean diet may also include red wine with your meal1 glass each day for women and up to 2 glasses a day for men. Tips for changing your diet  · Dip bread in a mix of olive oil and fresh herbs instead of using butter. · Add avocado slices to your sandwich instead of pham. · Have fish for lunch or dinner instead of red meat. Brush the fish with olive oil, and broil or grill it. · Sprinkle your salad with seeds or nuts instead of cheese. · Cook with olive or canola oil instead of butter or oils that are high in saturated fat. · Switch from 2% milk or whole milk to 1% or fat-free milk. · Dip raw vegetables in a vinaigrette dressing or hummus instead of dips made from mayonnaise or sour cream.  · Have a piece of fruit for dessert instead of a piece of cake. Try baked apples, or have some dried fruit. Part of the Mediterranean diet is being active. Get at least 30 minutes of exercise on most days of the week. Walking is a good choice.  You also may want to do other activities, such as running, swimming, cycling, or playing tennis or team sports. Where can you learn more? Go to http://diego-beatriz.info/. Enter O407 in the search box to learn more about \"Mediterranean Diet: Care Instructions. \"  Current as of: October 21, 2016  Content Version: 11.2  © 0855-3836 [employer] (TUGG). Care instructions adapted under license by [employer]ice (which disclaims liability or warranty for this information). If you have questions about a medical condition or this instruction, always ask your healthcare professional. [place] [address] any warranty or liability for your use of this information.

## 2017-03-30 NOTE — PROGRESS NOTES
1. Have you been to the ER, urgent care clinic since your last visit? Hospitalized since your last visit? No.     2. Have you seen or consulted any other health care providers outside of the [facility] since your last visit? Include any pap smears or colon screening. No.    Patient presents with follow up osteopenia, DVT and sarcoidosis.

## 2017-05-18 ENCOUNTER — OFFICE VISIT (OUTPATIENT)
Dept: FAMILY MEDICINE CLINIC | Age: 67
End: 2017-05-18

## 2017-05-18 VITALS
HEART RATE: 85 BPM | TEMPERATURE: 98.2 F | BODY MASS INDEX: 38.07 KG/M2 | SYSTOLIC BLOOD PRESSURE: 143 MMHG | RESPIRATION RATE: 16 BRPM | DIASTOLIC BLOOD PRESSURE: 82 MMHG | WEIGHT: 223 LBS | HEIGHT: 64 IN

## 2017-05-18 DIAGNOSIS — J45.21 MILD INTERMITTENT ASTHMA WITH ACUTE EXACERBATION: Primary | ICD-10-CM

## 2017-05-18 RX ORDER — BUDESONIDE AND FORMOTEROL FUMARATE DIHYDRATE 80; 4.5 UG/1; UG/1
2 AEROSOL RESPIRATORY (INHALATION) 2 TIMES DAILY
Qty: 1 INHALER | Refills: 1 | Status: SHIPPED | OUTPATIENT
Start: 2017-05-18 | End: 2018-06-14 | Stop reason: SDUPTHER

## 2017-05-18 RX ORDER — AZITHROMYCIN 250 MG/1
TABLET, FILM COATED ORAL
Qty: 6 TAB | Refills: 0 | Status: SHIPPED | OUTPATIENT
Start: 2017-05-18 | End: 2017-09-07 | Stop reason: ALTCHOICE

## 2017-05-18 RX ORDER — CODEINE PHOSPHATE AND GUAIFENESIN 10; 100 MG/5ML; MG/5ML
5 SOLUTION ORAL
Qty: 118 BOTTLE | Refills: 0 | Status: SHIPPED | OUTPATIENT
Start: 2017-05-18 | End: 2017-09-07 | Stop reason: ALTCHOICE

## 2017-05-18 NOTE — PATIENT INSTRUCTIONS
Budesonide/Formoterol (By breathing)   Budesonide (ndj-SWU-fm-nide), Formoterol Fumarate (for-ANN-ter-ol FUE-ma-rate)  Treats asthma and chronic obstructive pulmonary disease (COPD). This medicine contains a corticosteroid. Brand Name(s): Symbicort 160/4.5, Symbicort 80/4.5   There may be other brand names for this medicine. When This Medicine Should Not Be Used: You should not use this medicine if you have had an allergic reaction to budesonide or formoterol. You should not use this medicine if your asthma attack has already started, or if you are having a severe asthma attack or COPD flare-up. How to Use This Medicine:   Liquid Under Pressure, Powder  · Your doctor will tell you how much medicine to use. Do not use more than directed. · This medicine should come with a Medication Guide. Ask your pharmacist for a copy if you do not have one. · Test spray in the air before using for the first time or if the inhaler has not been used for a while. · Shake well for 5 seconds before using. You must keep track of the number of puffs you use. Use the dose tracker card to do this. Throw away the inhaler after you have used the number of puffs allowed, or if it is 3 months since you opened the foil pouch. · When you have finished all your inhalations, rinse your mouth out with water. Do not swallow the water after rinsing. If a dose is missed:   · Take a dose as soon as you remember. If it is almost time for your next dose, wait until then and take a regular dose. Do not take extra medicine to make up for a missed dose. How to Store and Dispose of This Medicine:   · Store the canister at room temperature, away from heat and direct light. Do not freeze. Do not keep this medicine inside a car where it could be exposed to extreme heat or cold. Do not poke holes in the canister or throw it into a fire, even if the canister is empty. Store the inhaler with the mouthpiece down.   · Ask your pharmacist, doctor, or health caregiver about the best way to dispose of the used medicine container and any leftover medicine. You will also need to throw away old medicine after the expiration date has passed. · Keep all medicine out of the reach of children. Never share your medicine with anyone. Drugs and Foods to Avoid:   Ask your doctor or pharmacist before using any other medicine, including over-the-counter medicines, vitamins, and herbal products. · Make sure your doctor knows if you are also using clarithromycin (Biaxin®), erythromycin (PCE®), itraconazole (Sporanox®), ketoconazole (Page Emeliois), telithromycin Chastity Curiel), medicine to treat HIV infection (such as atazanavir, indinavir, nelfinavir, ritonavir, saquinavir, Crixivan®, Fotovase®, Invirase®, Norvir®, or Reyataz®), blood pressure medicines (such as atenolol, labetalol, Inderal®, Tenormin®, or Toprol®), or diuretics or \"water pills\" (such as furosemide, hydrochlorothiazide [HCTZ], or Lasix®). Tell your doctor if you are taking a medicine for depression or took a depression medicine in the past 2 weeks such as amitriptyline, doxepin, nefazodone, Elavil®, Eldepryl®, Marplan®, Nardil®, Pamelor®, Parnate®, or Sinequan®. Warnings While Using This Medicine:   · Make sure your doctor knows if you are pregnant or breastfeeding, or if you have liver disease, bone problems (such as osteoporosis), heart or blood vessel disease, heart rhythm problems, high blood pressure, seizures, thyroid problems, diabetes, any kind of infection (especially tuberculosis or herpes infection of the eye), low potassium in the blood, or if you have a weakened immune system. Tell your doctor if you have eye problems (such as a cataract or glaucoma). · Although this medicine decreases the number of asthma episodes, it may increase the chances of a severe asthma episode when they do occur. Talk to your doctor about any questions or concerns that you have.   · This should not be the first and only medicine you use for asthma or COPD. This medicine will not stop an asthma attack that has already started. Your doctor may prescribe another medicine for you to use in case of an acute asthma attack or an acute COPD flare-up. If the other medicine does not work as well, tell your doctor right away. · Take all of your COPD medicines as your doctor ordered. If you use any type of corticosteroid medicine to control your breathing, keep using it as ordered by your doctor. Do not change your doses or stop using your medicines without asking your doctor. · Do not use any other asthma medicine or medicine for breathing problems without talking to your doctor. This medicine should not be used with arformoterol, formoterol, salmeterol, Brovana®, Perforomist, or Serevent® inhalers. · If any of your asthma medicines do not seem to be working as well as usual, call your doctor right away. Do not change your doses or stop using your medicines without asking your doctor. · Call your doctor if your symptoms do not improve or if they get worse. · You may get infections more easily while using this medicine. Avoid people who are sick or have infections. Tell your doctor right away if you have been exposed to someone with chickenpox or measles. · This medicine may cause a fungus infection of the mouth or throat (thrush). Tell your doctor right away if you have white patches in the mouth or throat; or pain when eating or swallowing. · Patients with COPD may be more likely to have pneumonia when taking this medicine. Check with your doctor if you start having an increased sputum (spit) production, change in sputum color, fever, chills, increased cough, or an increase in breathing problems. · Using too much of this medicine or using it for a long time may increase your risk of having adrenal gland problems.  Talk to your doctor if you have more than one of these symptoms while you are using this medicine: darkening of the skin; diarrhea; dizziness; fainting; loss of appetite; mental depression; nausea; skin rash; unusual tiredness or weakness; or vomiting. · This medicine may cause paradoxical bronchospasm, which means your breathing or wheezing will get worse. Paradoxical bronchospasm may be life-threatening. Stop using this medicine and check with your doctor right away if you have coughing, difficulty breathing, shortness of breath, or wheezing after using this medicine. · If you or your child develop a skin rash, hives, or any allergic reaction to this medicine, stop using the medicine and check with your doctor as soon as possible. · This medicine may decrease bone mineral density when used for a long time. A low bone mineral density can cause weak bones or osteoporosis. If you have any questions about this, ask your doctor. · This medicine may cause children to grow more slowly than usual. Talk to your child's doctor if you have any concerns. · Check with your doctor right away if you or your child have blurred vision, difficulty with reading, or any other change in vision while using this medicine. Your doctor may want you to have your eyes checked by an ophthalmologist (eye doctor). Be sure to keep all appointments. · This medicine may affect blood sugar and potassium levels. If you have heart disease or are diabetic and notice a change in the results of your blood or urine sugar or potassium tests, check with your doctor. · Your doctor may want you to carry a medical identification (ID) card stating that you are using this medicine. The card will say that you or your child may need additional medicine during an emergency, a severe asthma attack or other illness, or unusual stress. · Your doctor will check your progress and the effects of this medicine at regular visits. Keep all appointments.   Possible Side Effects While Using This Medicine:   Call your doctor right away if you notice any of these side effects:  · Allergic reaction: Itching or hives, swelling in your face or hands, swelling or tingling in your mouth or throat, chest tightness, trouble breathing  · Changes in vision. · Chest pain, shortness of breath, troubled breathing, tightness in the chest, or wheezing. · Dry mouth, increased thirst, or muscle cramps. · Fast, pounding, or uneven heartbeat. · Fever, chills, cough, runny or stuffy nose, sore throat, and body aches. · Lightheadedness, dizziness, or fainting. · Seizures or tremors. · Sores or white patches on your lips, mouth, or throat. If you notice these less serious side effects, talk with your doctor:   · Anxiety, nervousness, or restlessness. · Headache. · Nausea, vomiting, diarrhea, upset stomach, or stomach pain. · Skin rash. If you notice other side effects that you think are caused by this medicine, tell your doctor. Call your doctor for medical advice about side effects. You may report side effects to FDA at 7-626-FDA-2706  © 2017 [facility] Information is for End User's use only and may not be sold, redistributed or otherwise used for commercial purposes. The above information is an  only. It is not intended as medical advice for individual conditions or treatments. Talk to your doctor, nurse or pharmacist before following any medical regimen to see if it is safe and effective for you. Be sure to only take symbicort as directed. Wash your mouth out after using.

## 2017-05-18 NOTE — PROGRESS NOTES
Eleazar Gonzalez is a 61 y.o. female and presents with Wheezing; Cough; Shortness of Breath; and Cold Symptoms       Subjective:    Asthma flare- wheezing and sob for last week. Worsening. Rhinorrhea and eye watering. Coughing- dry. Some chills and woke with a sweat one night. Assessment/Plan:    Mikaela Rubio was seen today for wheezing, cough, shortness of breath and cold symptoms. Diagnoses and all orders for this visit:    Mild intermittent asthma with acute exacerbation- will rx z pack and explained symbicort use with peak flow meter. Pt will return if not improving. Other orders  -     budesonide-formoterol (SYMBICORT) 80-4.5 mcg/actuation HFAA inhaler; Take 2 Puffs by inhalation two (2) times a day. Use for 2 weeks at a time. -     azithromycin (ZITHROMAX) 250 mg tablet; Take two tablets today then one tablet daily  -     Peak Flow Meter lanette; 1 Units by Does Not Apply route daily.  -     guaiFENesin-codeine (ROBITUSSIN AC) 100-10 mg/5 mL solution; Take 5 mL by mouth three (3) times daily as needed for Cough. Max Daily Amount: 15 mL. RTC prn  Orders Placed This Encounter    budesonide-formoterol (SYMBICORT) 80-4.5 mcg/actuation HFAA inhaler     Sig: Take 2 Puffs by inhalation two (2) times a day. Use for 2 weeks at a time. Dispense:  1 Inhaler     Refill:  1    azithromycin (ZITHROMAX) 250 mg tablet     Sig: Take two tablets today then one tablet daily     Dispense:  6 Tab     Refill:  0    Peak Flow Meter lanette     Si Units by Does Not Apply route daily. Dispense:  1 Device     Refill:  0    guaiFENesin-codeine (ROBITUSSIN AC) 100-10 mg/5 mL solution     Sig: Take 5 mL by mouth three (3) times daily as needed for Cough. Max Daily Amount: 15 mL. Dispense:  118 Bottle     Refill:  0           ROS:  Negative except as mentioned above  Cardiac- no chest pain or palpitations  Pulmonary- no sob or wheezes  GI- no n/v or diarrhea.       SH:  Social History   Substance Use Topics    Smoking status: Never Smoker    Smokeless tobacco: None    Alcohol use No         Medications/Allergies:  Current Outpatient Prescriptions on File Prior to Visit   Medication Sig Dispense Refill    albuterol (PROVENTIL HFA, VENTOLIN HFA, PROAIR HFA) 90 mcg/actuation inhaler Take 2 Puffs by inhalation every six (6) hours as needed for Wheezing. 1 Inhaler 2    amLODIPine (NORVASC) 5 mg tablet Take 1 Tab by mouth daily. 30 Tab 5    valsartan-hydroCHLOROthiazide (DIOVAN-HCT) 80-12.5 mg per tablet Take 1 Tab by mouth two (2) times a day. 60 Tab 5    tiZANidine (ZANAFLEX) 2 mg tablet Take 1 tablet 3 times a day as needed 30 Tab 0    acetaminophen (TYLENOL EXTRA STRENGTH) 500 mg tablet Take 500 mg by mouth every four (4) hours as needed for Pain.  rivaroxaban (XARELTO) 20 mg tab tablet Take  by mouth daily.  folic acid (FOLVITE) 1 mg tablet Take  by mouth daily.  brimonidine-timolol (COMBIGAN) 0.2-0.5 % drop ophthalmic solution Administer 1 Drop to left eye daily.  ranitidine (ZANTAC) 150 mg tablet Take  by mouth as needed for Indigestion.  Cetirizine (ZYRTEC) 10 mg cap Take 10 mg by mouth as needed.  calcium-cholecalciferol, d3, (CALCIUM 600 + D) 600-125 mg-unit tab Take 600 mg by mouth daily.  docusate sodium (STOOL SOFTENER) 100 mg capsule Take 100 mg by mouth daily.  sodium chloride (AYR SALINE) 0.65 % nasal spray 1 Spray as needed for Congestion. No current facility-administered medications on file prior to visit. Allergies   Allergen Reactions    Codeine Other (comments)     \"Feel funny\"    Penicillin G Hives       Objective:  Visit Vitals    /82    Pulse 85    Temp 98.2 °F (36.8 °C) (Oral)    Resp 16    Ht 5' 4\" (1.626 m)    Wt 223 lb (101.2 kg)    BMI 38.28 kg/m2    Body mass index is 38.28 kg/(m^2). Constitutional: Well developed, nourished, no distress, alert   HENT: Exterior ears and tympanic membranes normal bilaterally. Supple neck.  No thyromegaly or lymphadenopathy. Oropharynx clear and moist mucous membranes. Eyes: Conjunctiva normal. PERRL. CV: S1, S2.  RRR. No murmurs/rubs. No thrills palpated. No carotid bruits. Intact distal pulses. No edema. Pulm: No abnormalities on inspection. Clear to auscultation bilaterally. No wheezing/rhonchi. Normal effort. GI: Soft, nontender, nondistended. Normal active bowel sounds. No  masses on palpation. No hepatosplenomegaly.

## 2017-05-18 NOTE — PROGRESS NOTES
1. Have you been to the ER, urgent care clinic since your last visit? Hospitalized since your last visit? 2. Have you seen or consulted any other health care providers outside of the [facility] since your last visit? Include any pap smears or colon screening. Patient presents with wheezing, SOB, cough and nasal congestion. Took OTC zyrtec cold and flu but it did not help.

## 2017-05-18 NOTE — MR AVS SNAPSHOT
Visit Information Date & Time Provider Department Dept. Phone Encounter #  
 5/18/2017  9:15 AM Cindia Cogan, 445 [facility] [phone] [id number] Follow-up Instructions Return if symptoms worsen or fail to improve. Your Appointments 11/9/2017  8:15 AM  
Office Visit with Cindia Cogan, MD  
[place] [address] [address] [facility]) Appt Note: mwv  
 [address]  
  
   
 [address] Upcoming Health Maintenance Date Due DTaP/Tdap/Td series (1 - Tdap) 7/3/1973 Pneumococcal 19-64 Highest Risk (2 of 3 - PCV13) 11/17/2016 PAP AKA CERVICAL CYTOLOGY 10/15/2047* INFLUENZA AGE 9 TO ADULT 8/1/2017 MEDICARE YEARLY EXAM 11/9/2017 BREAST CANCER SCRN MAMMOGRAM 10/13/2018 COLONOSCOPY 11/4/2024 *Topic was postponed. The date shown is not the original due date. Allergies as of 5/18/2017  Review Complete On: 5/18/2017 By: Cindia Cogan, MD  
  
 Severity Noted Reaction Type Reactions Penicillin G  09/08/2016    Hives Current Immunizations  Reviewed on 3/30/2017 Name Date Influenza Vaccine (Quad) PF 9/29/2016 Pneumococcal Polysaccharide (PPSV-23) 11/17/2015 Zoster Vaccine, Live 6/11/2013 Not reviewed this visit You Were Diagnosed With   
  
 Codes Comments Mild intermittent asthma with acute exacerbation    -  Primary ICD-10-CM: J45.21 ICD-9-CM: 564.83 Vitals BP Pulse Temp Resp Height(growth percentile) Weight(growth percentile) 143/82 85 98.2 °F (36.8 °C) (Oral) 16 5' 4\" (1.626 m) 223 lb (101.2 kg) BMI OB Status Smoking Status 38.28 kg/m2 Hysterectomy Never Smoker Vitals History BMI and BSA Data Body Mass Index Body Surface Area  
 38.28 kg/m 2 2.14 m 2 Preferred Pharmacy Pharmacy Name Phone [facility] [address] [place] [address] [phone] Your Updated Medication List  
  
   
This list is accurate as of: 5/18/17  9:46 AM.  Always use your most recent med list.  
  
  
  
  
 albuterol 90 mcg/actuation inhaler Commonly known as:  PROVENTIL HFA, VENTOLIN HFA, PROAIR HFA Take 2 Puffs by inhalation every six (6) hours as needed for Wheezing. amLODIPine 5 mg tablet Commonly known as:  Iza Punter Take 1 Tab by mouth daily. AYR SALINE 0.65 % nasal spray Generic drug:  sodium chloride 1 Spray as needed for Congestion. azithromycin 250 mg tablet Commonly known as:  Dorothe Alias Take two tablets today then one tablet daily  
  
 budesonide-formoterol 80-4.5 mcg/actuation Hfaa inhaler Commonly known as:  SYMBICORT Take 2 Puffs by inhalation two (2) times a day. Use for 2 weeks at a time. CALCIUM 600 + D 600-125 mg-unit Tab Generic drug:  calcium-cholecalciferol (d3) Take 600 mg by mouth daily. COMBIGAN 0.2-0.5 % Drop ophthalmic solution Generic drug:  brimonidine-timolol Administer 1 Drop to left eye daily. folic acid 1 mg tablet Commonly known as:  Google Take  by mouth daily. guaiFENesin-codeine 100-10 mg/5 mL solution Commonly known as:  ROBITUSSIN AC Take 5 mL by mouth three (3) times daily as needed for Cough. Max Daily Amount: 15 mL. Peak Flow Meter Bridgette  
1 Units by Does Not Apply route daily. STOOL SOFTENER 100 mg capsule Generic drug:  docusate sodium Take 100 mg by mouth daily. tiZANidine 2 mg tablet Commonly known as:  Mark Silverman Take 1 tablet 3 times a day as needed TYLENOL EXTRA STRENGTH 500 mg tablet Generic drug:  acetaminophen Take 500 mg by mouth every four (4) hours as needed for Pain.  
  
 valsartan-hydroCHLOROthiazide 80-12.5 mg per tablet Commonly known as:  DIOVAN-HCT Take 1 Tab by mouth two (2) times a day. XARELTO 20 mg Tab tablet Generic drug:  rivaroxaban Take  by mouth daily. ZANTAC 150 mg tablet Generic drug:  raNITIdine Take  by mouth as needed for Indigestion. ZyrTEC 10 mg Cap Generic drug:  Cetirizine Take 10 mg by mouth as needed. Prescriptions Printed Refills  
 guaiFENesin-codeine (ROBITUSSIN AC) 100-10 mg/5 mL solution 0 Sig: Take 5 mL by mouth three (3) times daily as needed for Cough. Max Daily Amount: 15 mL. Class: Print Route: Oral  
  
Prescriptions Sent to Pharmacy Refills  
 budesonide-formoterol (SYMBICORT) 80-4.5 mcg/actuation HFAA inhaler 1 Sig: Take 2 Puffs by inhalation two (2) times a day. Use for 2 weeks at a time. Class: Normal  
 Pharmacy: [facility] [address] [place] [address] Ph #: [phone] Route: Inhalation  
 azithromycin (ZITHROMAX) 250 mg tablet 0 Sig: Take two tablets today then one tablet daily Class: Normal  
 Pharmacy: [facility] [place] [address] Ph #: [phone] Peak Flow Meter lanette 0 Si Units by Does Not Apply route daily. Class: Normal  
 Pharmacy: [facility] [address] [place] [address] Ph #: [phone] Route: Does Not Apply Follow-up Instructions Return if symptoms worsen or fail to improve. Patient Instructions Budesonide/Formoterol (By breathing) Budesonide (oqt-WZK-gz-nide), Formoterol Fumarate (for-ANN-ter-ol FUE-ma-rate) Treats asthma and chronic obstructive pulmonary disease (COPD). This medicine contains a corticosteroid. Brand Name(s): Symbicort 160/4.5, Symbicort 80/4.5 There may be other brand names for this medicine. When This Medicine Should Not Be Used: You should not use this medicine if you have had an allergic reaction to budesonide or formoterol.  You should not use this medicine if your asthma attack has already started, or if you are having a severe asthma attack or COPD flare-up. How to Use This Medicine:  
Liquid Under Pressure, Powder · Your doctor will tell you how much medicine to use. Do not use more than directed. · This medicine should come with a Medication Guide. Ask your pharmacist for a copy if you do not have one. · Test spray in the air before using for the first time or if the inhaler has not been used for a while. · Shake well for 5 seconds before using. You must keep track of the number of puffs you use. Use the dose tracker card to do this. Throw away the inhaler after you have used the number of puffs allowed, or if it is 3 months since you opened the foil pouch. · When you have finished all your inhalations, rinse your mouth out with water. Do not swallow the water after rinsing. If a dose is missed: · Take a dose as soon as you remember. If it is almost time for your next dose, wait until then and take a regular dose. Do not take extra medicine to make up for a missed dose. How to Store and Dispose of This Medicine: · Store the canister at room temperature, away from heat and direct light. Do not freeze. Do not keep this medicine inside a car where it could be exposed to extreme heat or cold. Do not poke holes in the canister or throw it into a fire, even if the canister is empty. Store the inhaler with the mouthpiece down. · Ask your pharmacist, doctor, or health caregiver about the best way to dispose of the used medicine container and any leftover medicine. You will also need to throw away old medicine after the expiration date has passed. · Keep all medicine out of the reach of children. Never share your medicine with anyone. Drugs and Foods to Avoid: Ask your doctor or pharmacist before using any other medicine, including over-the-counter medicines, vitamins, and herbal products.  
· Make sure your doctor knows if you are also using clarithromycin (Biaxin®), erythromycin (PCE®), itraconazole (Sporanox®), ketoconazole Sergey Choe, telithromycin Linsey Delvin), medicine to treat HIV infection (such as atazanavir, indinavir, nelfinavir, ritonavir, saquinavir, Crixivan®, Fotovase®, Invirase®, Norvir®, or Reyataz®), blood pressure medicines (such as atenolol, labetalol, Inderal®, Tenormin®, or Toprol®), or diuretics or \"water pills\" (such as furosemide, hydrochlorothiazide [HCTZ], or Lasix®). Tell your doctor if you are taking a medicine for depression or took a depression medicine in the past 2 weeks such as amitriptyline, doxepin, nefazodone, Elavil®, Eldepryl®, Marplan®, Nardil®, Pamelor®, Parnate®, or Sinequan®. Warnings While Using This Medicine: · Make sure your doctor knows if you are pregnant or breastfeeding, or if you have liver disease, bone problems (such as osteoporosis), heart or blood vessel disease, heart rhythm problems, high blood pressure, seizures, thyroid problems, diabetes, any kind of infection (especially tuberculosis or herpes infection of the eye), low potassium in the blood, or if you have a weakened immune system. Tell your doctor if you have eye problems (such as a cataract or glaucoma). · Although this medicine decreases the number of asthma episodes, it may increase the chances of a severe asthma episode when they do occur. Talk to your doctor about any questions or concerns that you have. · This should not be the first and only medicine you use for asthma or COPD. This medicine will not stop an asthma attack that has already started. Your doctor may prescribe another medicine for you to use in case of an acute asthma attack or an acute COPD flare-up. If the other medicine does not work as well, tell your doctor right away. · Take all of your COPD medicines as your doctor ordered. If you use any type of corticosteroid medicine to control your breathing, keep using it as ordered by your doctor. Do not change your doses or stop using your medicines without asking your doctor. · Do not use any other asthma medicine or medicine for breathing problems without talking to your doctor. This medicine should not be used with arformoterol, formoterol, salmeterol, Brovana®, Perforomist, or Serevent® inhalers. · If any of your asthma medicines do not seem to be working as well as usual, call your doctor right away. Do not change your doses or stop using your medicines without asking your doctor. · Call your doctor if your symptoms do not improve or if they get worse. · You may get infections more easily while using this medicine. Avoid people who are sick or have infections. Tell your doctor right away if you have been exposed to someone with chickenpox or measles. · This medicine may cause a fungus infection of the mouth or throat (thrush). Tell your doctor right away if you have white patches in the mouth or throat; or pain when eating or swallowing. · Patients with COPD may be more likely to have pneumonia when taking this medicine. Check with your doctor if you start having an increased sputum (spit) production, change in sputum color, fever, chills, increased cough, or an increase in breathing problems. · Using too much of this medicine or using it for a long time may increase your risk of having adrenal gland problems. Talk to your doctor if you have more than one of these symptoms while you are using this medicine: darkening of the skin; diarrhea; dizziness; fainting; loss of appetite; mental depression; nausea; skin rash; unusual tiredness or weakness; or vomiting. · This medicine may cause paradoxical bronchospasm, which means your breathing or wheezing will get worse. Paradoxical bronchospasm may be life-threatening. Stop using this medicine and check with your doctor right away if you have coughing, difficulty breathing, shortness of breath, or wheezing after using this medicine.  
· If you or your child develop a skin rash, hives, or any allergic reaction to this medicine, stop using the medicine and check with your doctor as soon as possible. · This medicine may decrease bone mineral density when used for a long time. A low bone mineral density can cause weak bones or osteoporosis. If you have any questions about this, ask your doctor. · This medicine may cause children to grow more slowly than usual. Talk to your child's doctor if you have any concerns. · Check with your doctor right away if you or your child have blurred vision, difficulty with reading, or any other change in vision while using this medicine. Your doctor may want you to have your eyes checked by an ophthalmologist (eye doctor). Be sure to keep all appointments. · This medicine may affect blood sugar and potassium levels. If you have heart disease or are diabetic and notice a change in the results of your blood or urine sugar or potassium tests, check with your doctor. · Your doctor may want you to carry a medical identification (ID) card stating that you are using this medicine. The card will say that you or your child may need additional medicine during an emergency, a severe asthma attack or other illness, or unusual stress. · Your doctor will check your progress and the effects of this medicine at regular visits. Keep all appointments. Possible Side Effects While Using This Medicine:  
Call your doctor right away if you notice any of these side effects: · Allergic reaction: Itching or hives, swelling in your face or hands, swelling or tingling in your mouth or throat, chest tightness, trouble breathing · Changes in vision. · Chest pain, shortness of breath, troubled breathing, tightness in the chest, or wheezing. · Dry mouth, increased thirst, or muscle cramps. · Fast, pounding, or uneven heartbeat. · Fever, chills, cough, runny or stuffy nose, sore throat, and body aches. · Lightheadedness, dizziness, or fainting. · Seizures or tremors. · Sores or white patches on your lips, mouth, or throat. If you notice these less serious side effects, talk with your doctor: · Anxiety, nervousness, or restlessness. · Headache. · Nausea, vomiting, diarrhea, upset stomach, or stomach pain. · Skin rash. If you notice other side effects that you think are caused by this medicine, tell your doctor. Call your doctor for medical advice about side effects. You may report side effects to FDA at 7-216-QMM-3097 © 2017 [address] Information is for End User's use only and may not be sold, redistributed or otherwise used for commercial purposes. The above information is an  only. It is not intended as medical advice for individual conditions or treatments. Talk to your doctor, nurse or pharmacist before following any medical regimen to see if it is safe and effective for you. Be sure to only take symbicort as directed. Wash your mouth out after using. Introducing [facility] & HEALTH SERVICES! Dear Farhana Hammer: Thank you for requesting a TriplePulse account. Our records indicate that you already have an active TriplePulse account. You can access your account anytime at [email or web address] Did you know that you can access your hospital and ER discharge instructions at any time in TriplePulse? You can also review all of your test results from your hospital stay or ER visit. Additional Information If you have questions, please visit the Frequently Asked Questions section of the TriplePulse website at [email or web address] Remember, Pressflipt is NOT to be used for urgent needs. For medical emergencies, dial 911. Now available from your iPhone and Android! Please provide this summary of care documentation to your next provider. Your primary care clinician is listed as JCARLOS Javier. If you have any questions after today's visit, please call [phone].

## 2017-07-20 ENCOUNTER — OFFICE VISIT (OUTPATIENT)
Dept: FAMILY MEDICINE CLINIC | Age: 67
End: 2017-07-20

## 2017-07-20 ENCOUNTER — HOSPITAL ENCOUNTER (OUTPATIENT)
Dept: LAB | Age: 67
Discharge: HOME | End: 2017-07-20
Payer: MEDICARE

## 2017-07-20 VITALS
BODY MASS INDEX: 37.66 KG/M2 | WEIGHT: 220.6 LBS | TEMPERATURE: 97.8 F | DIASTOLIC BLOOD PRESSURE: 82 MMHG | HEART RATE: 83 BPM | RESPIRATION RATE: 16 BRPM | HEIGHT: 64 IN | SYSTOLIC BLOOD PRESSURE: 117 MMHG

## 2017-07-20 DIAGNOSIS — R10.84 GENERALIZED ABDOMINAL PAIN: ICD-10-CM

## 2017-07-20 DIAGNOSIS — D50.9 MICROCYTIC ANEMIA: ICD-10-CM

## 2017-07-20 DIAGNOSIS — R10.84 GENERALIZED ABDOMINAL PAIN: Primary | ICD-10-CM

## 2017-07-20 LAB
ALBUMIN SERPL BCP-MCNC: 4.1 G/DL (ref 3.4–5)
ALBUMIN/GLOB SERPL: 1.2 {RATIO} (ref 0.8–1.7)
ALP SERPL-CCNC: 101 U/L (ref 45–117)
ALT SERPL-CCNC: 17 U/L (ref 13–56)
ANION GAP BLD CALC-SCNC: 7 MMOL/L (ref 3–18)
AST SERPL W P-5'-P-CCNC: 13 U/L (ref 15–37)
BASOPHILS # BLD AUTO: 0 K/UL (ref 0–0.06)
BASOPHILS # BLD: 0 % (ref 0–2)
BILIRUB SERPL-MCNC: 0.4 MG/DL (ref 0.2–1)
BUN SERPL-MCNC: 20 MG/DL (ref 7–18)
BUN/CREAT SERPL: 17 (ref 12–20)
CALCIUM SERPL-MCNC: 9 MG/DL (ref 8.5–10.1)
CHLORIDE SERPL-SCNC: 105 MMOL/L (ref 100–108)
CO2 SERPL-SCNC: 29 MMOL/L (ref 21–32)
CREAT SERPL-MCNC: 1.21 MG/DL (ref 0.6–1.3)
DIFFERENTIAL METHOD BLD: ABNORMAL
EOSINOPHIL # BLD: 0.2 K/UL (ref 0–0.4)
EOSINOPHIL NFR BLD: 2 % (ref 0–5)
ERYTHROCYTE [DISTWIDTH] IN BLOOD BY AUTOMATED COUNT: 16.3 % (ref 11.6–14.5)
GLOBULIN SER CALC-MCNC: 3.4 G/DL (ref 2–4)
GLUCOSE SERPL-MCNC: 81 MG/DL (ref 74–99)
HCT VFR BLD AUTO: 31.1 % (ref 35–45)
HGB BLD-MCNC: 9.8 G/DL (ref 12–16)
LYMPHOCYTES # BLD AUTO: 26 % (ref 21–52)
LYMPHOCYTES # BLD: 2.2 K/UL (ref 0.9–3.6)
MCH RBC QN AUTO: 22.3 PG (ref 24–34)
MCHC RBC AUTO-ENTMCNC: 31.5 G/DL (ref 31–37)
MCV RBC AUTO: 70.8 FL (ref 74–97)
MONOCYTES # BLD: 0.5 K/UL (ref 0.05–1.2)
MONOCYTES NFR BLD AUTO: 6 % (ref 3–10)
NEUTS SEG # BLD: 5.6 K/UL (ref 1.8–8)
NEUTS SEG NFR BLD AUTO: 66 % (ref 40–73)
PLATELET # BLD AUTO: 167 K/UL (ref 135–420)
PMV BLD AUTO: 9.3 FL (ref 9.2–11.8)
POTASSIUM SERPL-SCNC: 4.3 MMOL/L (ref 3.5–5.5)
PROT SERPL-MCNC: 7.5 G/DL (ref 6.4–8.2)
RBC # BLD AUTO: 4.39 M/UL (ref 4.2–5.3)
SODIUM SERPL-SCNC: 141 MMOL/L (ref 136–145)
WBC # BLD AUTO: 8.4 K/UL (ref 4.6–13.2)

## 2017-07-20 PROCEDURE — 36415 COLL VENOUS BLD VENIPUNCTURE: CPT | Performed by: INTERNAL MEDICINE

## 2017-07-20 PROCEDURE — 80053 COMPREHEN METABOLIC PANEL: CPT | Performed by: INTERNAL MEDICINE

## 2017-07-20 PROCEDURE — 85025 COMPLETE CBC W/AUTO DIFF WBC: CPT | Performed by: INTERNAL MEDICINE

## 2017-07-20 RX ORDER — ONDANSETRON 4 MG/1
4 TABLET, ORALLY DISINTEGRATING ORAL
Qty: 30 TAB | Refills: 0 | Status: SHIPPED | OUTPATIENT
Start: 2017-07-20 | End: 2017-10-12 | Stop reason: ALTCHOICE

## 2017-07-20 NOTE — MR AVS SNAPSHOT
Visit Information Date & Time Provider Department Dept. Phone Encounter #  
 7/20/2017  1:00 PM Joce Chavez, 445 [facility] [phone] [id number] Your Appointments 11/9/2017  8:15 AM  
Office Visit with Joce Chavez MD  
[facility] [address] [place] [address]) Appt Note: mwv  
 [address]  
  
   
 [address] [facility] Upcoming Health Maintenance Date Due DTaP/Tdap/Td series (1 - Tdap) 7/3/1973 Pneumococcal 65+ High/Highest Risk (1 of 2 - PCV13) 7/3/2017 PAP AKA CERVICAL CYTOLOGY 10/15/2047* INFLUENZA AGE 9 TO ADULT 8/1/2017 MEDICARE YEARLY EXAM 11/9/2017 BREAST CANCER SCRN MAMMOGRAM 10/13/2018 GLAUCOMA SCREENING Q2Y 6/13/2019 COLONOSCOPY 11/4/2024 *Topic was postponed. The date shown is not the original due date. Allergies as of 7/20/2017  Review Complete On: 7/20/2017 By: Joce Chavez MD  
  
 Severity Noted Reaction Type Reactions Penicillin G  09/08/2016    Hives Current Immunizations  Reviewed on 3/30/2017 Name Date Influenza Vaccine (Quad) PF 9/29/2016 Pneumococcal Polysaccharide (PPSV-23) 11/17/2015 Zoster Vaccine, Live 6/11/2013 Not reviewed this visit You Were Diagnosed With   
  
 Codes Comments Generalized abdominal pain    -  Primary ICD-10-CM: R10.84 ICD-9-CM: 789.07 Microcytic anemia     ICD-10-CM: D50.9 ICD-9-CM: 280.9 Vitals BP Pulse Temp Resp Height(growth percentile) Weight(growth percentile) 117/82 83 97.8 °F (36.6 °C) (Oral) 16 5' 4\" (1.626 m) 220 lb 9.6 oz (100.1 kg) BMI OB Status Smoking Status 37.87 kg/m2 Hysterectomy Never Smoker BMI and BSA Data Body Mass Index Body Surface Area  
 37.87 kg/m 2 2.13 m 2 Preferred Pharmacy Pharmacy Name Phone  [address], [address] [place] [address] [phone] Your Updated Medication List  
  
   
This list is accurate as of: 7/20/17  1:20 PM.  Always use your most recent med list.  
  
  
  
  
 albuterol 90 mcg/actuation inhaler Commonly known as:  PROVENTIL HFA, VENTOLIN HFA, PROAIR HFA Take 2 Puffs by inhalation every six (6) hours as needed for Wheezing. amLODIPine 5 mg tablet Commonly known as:  Gumlog Alexander Take 1 Tab by mouth daily. AYR SALINE 0.65 % nasal spray Generic drug:  sodium chloride 1 Spray as needed for Congestion. azithromycin 250 mg tablet Commonly known as:  Efren Armijo Take two tablets today then one tablet daily  
  
 budesonide-formoterol 80-4.5 mcg/actuation Hfaa inhaler Commonly known as:  SYMBICORT Take 2 Puffs by inhalation two (2) times a day. Use for 2 weeks at a time. CALCIUM 600 + D 600-125 mg-unit Tab Generic drug:  calcium-cholecalciferol (d3) Take 600 mg by mouth daily. COMBIGAN 0.2-0.5 % Drop ophthalmic solution Generic drug:  brimonidine-timolol Administer 1 Drop to left eye daily. folic acid 1 mg tablet Commonly known as:  Google Take  by mouth daily. guaiFENesin-codeine 100-10 mg/5 mL solution Commonly known as:  ROBITUSSIN AC Take 5 mL by mouth three (3) times daily as needed for Cough. Max Daily Amount: 15 mL. Peak Flow Meter Bridgette  
1 Units by Does Not Apply route daily. STOOL SOFTENER 100 mg capsule Generic drug:  docusate sodium Take 100 mg by mouth daily. tiZANidine 2 mg tablet Commonly known as:  Rana [place] Take 1 tablet 3 times a day as needed TYLENOL EXTRA STRENGTH 500 mg tablet Generic drug:  acetaminophen Take 500 mg by mouth every four (4) hours as needed for Pain.  
  
 valsartan-hydroCHLOROthiazide 80-12.5 mg per tablet Commonly known as:  DIOVAN-HCT Take 1 Tab by mouth two (2) times a day. XARELTO 20 mg Tab tablet Generic drug:  rivaroxaban Take  by mouth daily. ZANTAC 150 mg tablet Generic drug:  raNITIdine Take  by mouth as needed for Indigestion. ZyrTEC 10 mg Cap Generic drug:  Cetirizine Take 10 mg by mouth as needed. To-Do List   
 07/20/2017 Lab:  CBC WITH AUTOMATED DIFF   
  
 07/20/2017 Lab:  METABOLIC PANEL, COMPREHENSIVE Patient Instructions Please be sure to let us know if the stomach pain is not better by next week so we can order CT scan and maybe a GI referral.  
 
  
Abdominal Pain: Care Instructions Your Care Instructions Abdominal pain has many possible causes. Some aren't serious and get better on their own in a few days. Others need more testing and treatment. If your pain continues or gets worse, you need to be rechecked and may need more tests to find out what is wrong. You may need surgery to correct the problem. Don't ignore new symptoms, such as fever, nausea and vomiting, urination problems, pain that gets worse, and dizziness. These may be signs of a more serious problem. Your doctor may have recommended a follow-up visit in the next 8 to 12 hours. If you are not getting better, you may need more tests or treatment. The doctor has checked you carefully, but problems can develop later. If you notice any problems or new symptoms, get medical treatment right away. Follow-up care is a key part of your treatment and safety. Be sure to make and go to all appointments, and call your doctor if you are having problems. It's also a good idea to know your test results and keep a list of the medicines you take. How can you care for yourself at home? · Rest until you feel better. · To prevent dehydration, drink plenty of fluids, enough so that your urine is light yellow or clear like water. Choose water and other caffeine-free clear liquids until you feel better.  If you have kidney, heart, or liver disease and have to limit fluids, talk with your doctor before you increase the amount of fluids you drink. · If your stomach is upset, eat mild foods, such as rice, dry toast or crackers, bananas, and applesauce. Try eating several small meals instead of two or three large ones. · Wait until 48 hours after all symptoms have gone away before you have spicy foods, alcohol, and drinks that contain caffeine. · Do not eat foods that are high in fat. · Avoid anti-inflammatory medicines such as aspirin, ibuprofen (Advil, Motrin), and naproxen (Aleve). These can cause stomach upset. Talk to your doctor if you take daily aspirin for another health problem. When should you call for help? Call 911 anytime you think you may need emergency care. For example, call if: 
· You passed out (lost consciousness). · You pass maroon or very bloody stools. · You vomit blood or what looks like coffee grounds. · You have new, severe belly pain. Call your doctor now or seek immediate medical care if: 
· Your pain gets worse, especially if it becomes focused in one area of your belly. · You have a new or higher fever. · Your stools are black and look like tar, or they have streaks of blood. · You have unexpected vaginal bleeding. · You have symptoms of a urinary tract infection. These may include: 
¨ Pain when you urinate. ¨ Urinating more often than usual. 
¨ Blood in your urine. · You are dizzy or lightheaded, or you feel like you may faint. Watch closely for changes in your health, and be sure to contact your doctor if: 
· You are not getting better after 1 day (24 hours). Where can you learn more? Go to http://diego-beatriz.info/. Enter Q796 in the search box to learn more about \"Abdominal Pain: Care Instructions. \" Current as of: March 20, 2017 Content Version: 11.3 © 1248-0908 [employer].  Care instructions adapted under license by [employer] (which disclaims liability or warranty for this information). If you have questions about a medical condition or this instruction, always ask your healthcare professional. [address] any warranty or liability for your use of this information. Introducing [facility] & HEALTH SERVICES! Dear Warden Khan: Thank you for requesting a Coolerado account. Our records indicate that you already have an active Coolerado account. You can access your account anytime at [email or web address] Did you know that you can access your hospital and ER discharge instructions at any time in Coolerado? You can also review all of your test results from your hospital stay or ER visit. Additional Information If you have questions, please visit the Frequently Asked Questions section of the Coolerado website at [email or web address] Remember, Coolerado is NOT to be used for urgent needs. For medical emergencies, dial 911. Now available from your iPhone and Android! Please provide this summary of care documentation to your next provider. Your primary care clinician is listed as JCARLOS Javier. If you have any questions after today's visit, please call [phone].

## 2017-07-20 NOTE — PROGRESS NOTES
Sepideh Shukla is a 74 y.o. female and presents with Nausea and Abdominal Pain       Subjective:    Having abd pain - for about 2 weeks. Upper abdomen and right upper abdomen. Sharp pulling pain. Only lasts a few seconds. +nausea- constant, not related to eating. Having bm daily but smaller than usual. No emesis. No melena. No hematochezia. No raw food. No travel. No pets. No new meds. Assessment/Plan:    abd pain- unclear etiology-vital signs are reassuring today although systolic pressure is slightly lower than normal.  No fever. Will do a CMP and CBC today if normal and pain continues into next week we will plan to do a CT scan of the abdomen and/or a GI referral.  The patient will let us know if she develops any stool changes such as diarrhea or black tarry sticky stools. 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 Will also prescribe Zofran for her nausea and she was encouraged to try some Pepto-Bismol for her symptoms. She will go to the emergency room for any high fevers or dramatic new symptoms    RTC prn. Orders Placed This Encounter    METABOLIC PANEL, COMPREHENSIVE     Standing Status:   Future     Standing Expiration Date:   7/21/2018    CBC WITH AUTOMATED DIFF     Standing Status:   Future     Standing Expiration Date:   7/21/2018    ondansetron (ZOFRAN ODT) 4 mg disintegrating tablet     Sig: Take 1 Tab by mouth every eight (8) hours as needed for Nausea. Dispense:  30 Tab     Refill:  0     Zoltan Zabala was seen today for nausea and abdominal pain. Diagnoses and all orders for this visit:    Generalized abdominal pain  -     METABOLIC PANEL, COMPREHENSIVE; Future  -     CBC WITH AUTOMATED DIFF; Future    Microcytic anemia  -     CBC WITH AUTOMATED DIFF; Future    Other orders  -     ondansetron (ZOFRAN ODT) 4 mg disintegrating tablet; Take 1 Tab by mouth every eight (8) hours as needed for Nausea.             ROS:  Negative except as mentioned above  Cardiac- no chest pain or palpitations  Pulmonary- no sob or wheezes  GI- no n/v or diarrhea. SH:  Social History   Substance Use Topics    Smoking status: Never Smoker    Smokeless tobacco: Never Used    Alcohol use No         Medications/Allergies:  Current Outpatient Prescriptions on File Prior to Visit   Medication Sig Dispense Refill    budesonide-formoterol (SYMBICORT) 80-4.5 mcg/actuation HFAA inhaler Take 2 Puffs by inhalation two (2) times a day. Use for 2 weeks at a time. 1 Inhaler 1    Peak Flow Meter lanette 1 Units by Does Not Apply route daily. 1 Device 0    guaiFENesin-codeine (ROBITUSSIN AC) 100-10 mg/5 mL solution Take 5 mL by mouth three (3) times daily as needed for Cough. Max Daily Amount: 15 mL. 118 Bottle 0    albuterol (PROVENTIL HFA, VENTOLIN HFA, PROAIR HFA) 90 mcg/actuation inhaler Take 2 Puffs by inhalation every six (6) hours as needed for Wheezing. 1 Inhaler 2    amLODIPine (NORVASC) 5 mg tablet Take 1 Tab by mouth daily. 30 Tab 5    valsartan-hydroCHLOROthiazide (DIOVAN-HCT) 80-12.5 mg per tablet Take 1 Tab by mouth two (2) times a day. 60 Tab 5    acetaminophen (TYLENOL EXTRA STRENGTH) 500 mg tablet Take 500 mg by mouth every four (4) hours as needed for Pain.  rivaroxaban (XARELTO) 20 mg tab tablet Take  by mouth daily.  folic acid (FOLVITE) 1 mg tablet Take  by mouth daily.  brimonidine-timolol (COMBIGAN) 0.2-0.5 % drop ophthalmic solution Administer 1 Drop to left eye daily.  ranitidine (ZANTAC) 150 mg tablet Take  by mouth as needed for Indigestion.  Cetirizine (ZYRTEC) 10 mg cap Take 10 mg by mouth as needed.  calcium-cholecalciferol, d3, (CALCIUM 600 + D) 600-125 mg-unit tab Take 600 mg by mouth daily.  docusate sodium (STOOL SOFTENER) 100 mg capsule Take 100 mg by mouth daily.  sodium chloride (AYR SALINE) 0.65 % nasal spray 1 Spray as needed for Congestion.       azithromycin (ZITHROMAX) 250 mg tablet Take two tablets today then one tablet daily 6 Tab 0  tiZANidine (ZANAFLEX) 2 mg tablet Take 1 tablet 3 times a day as needed 30 Tab 0     No current facility-administered medications on file prior to visit. Allergies   Allergen Reactions    Penicillin G Hives       Objective:  Visit Vitals    /82    Pulse 83    Temp 97.8 °F (36.6 °C) (Oral)    Resp 16    Ht 5' 4\" (1.626 m)    Wt 220 lb 9.6 oz (100.1 kg)    BMI 37.87 kg/m2    Body mass index is 37.87 kg/(m^2). Constitutional: Well developed, nourished, no distress, alert   Rectal exam-  +ext hemorrhoids, no palpable abnormality. No fissure or thrombosed hemorrhoid appreciable. Normal tone. Heme negative stool. Eyes: Conjunctiva normal. PERRL. CV: S1, S2.  RRR. No murmurs/rubs. No thrills palpated. No carotid bruits. Intact distal pulses. No edema. Pulm: No abnormalities on inspection. Clear to auscultation bilaterally. No wheezing/rhonchi. Normal effort. GI: Soft, mildnder, nondistended. Normal active bowel sounds. No  masses on palpation. No hepatosplenomegaly.

## 2017-07-20 NOTE — PROGRESS NOTES
1. Have you been to the ER, urgent care clinic since your last visit? Hospitalized since your last visit? No.     2. Have you seen or consulted any other health care providers outside of the [address] [place] [address] since your last visit? Include any pap smears or colon screening. No.     Patient presents with nauseated, abdominal pain and incomplete bowel movement.

## 2017-07-20 NOTE — PATIENT INSTRUCTIONS
Please be sure to let us know if the stomach pain is not better by next week so we can order CT scan and maybe a GI referral.        Abdominal Pain: Care Instructions  Your Care Instructions    Abdominal pain has many possible causes. Some aren't serious and get better on their own in a few days. Others need more testing and treatment. If your pain continues or gets worse, you need to be rechecked and may need more tests to find out what is wrong. You may need surgery to correct the problem. Don't ignore new symptoms, such as fever, nausea and vomiting, urination problems, pain that gets worse, and dizziness. These may be signs of a more serious problem. Your doctor may have recommended a follow-up visit in the next 8 to 12 hours. If you are not getting better, you may need more tests or treatment. The doctor has checked you carefully, but problems can develop later. If you notice any problems or new symptoms, get medical treatment right away. Follow-up care is a key part of your treatment and safety. Be sure to make and go to all appointments, and call your doctor if you are having problems. It's also a good idea to know your test results and keep a list of the medicines you take. How can you care for yourself at home? · Rest until you feel better. · To prevent dehydration, drink plenty of fluids, enough so that your urine is light yellow or clear like water. Choose water and other caffeine-free clear liquids until you feel better. If you have kidney, heart, or liver disease and have to limit fluids, talk with your doctor before you increase the amount of fluids you drink. · If your stomach is upset, eat mild foods, such as rice, dry toast or crackers, bananas, and applesauce. Try eating several small meals instead of two or three large ones. · Wait until 48 hours after all symptoms have gone away before you have spicy foods, alcohol, and drinks that contain caffeine.   · Do not eat foods that are high in fat.  · Avoid anti-inflammatory medicines such as aspirin, ibuprofen (Advil, Motrin), and naproxen (Aleve). These can cause stomach upset. Talk to your doctor if you take daily aspirin for another health problem. When should you call for help? Call 911 anytime you think you may need emergency care. For example, call if:  · You passed out (lost consciousness). · You pass maroon or very bloody stools. · You vomit blood or what looks like coffee grounds. · You have new, severe belly pain. Call your doctor now or seek immediate medical care if:  · Your pain gets worse, especially if it becomes focused in one area of your belly. · You have a new or higher fever. · Your stools are black and look like tar, or they have streaks of blood. · You have unexpected vaginal bleeding. · You have symptoms of a urinary tract infection. These may include:  ¨ Pain when you urinate. ¨ Urinating more often than usual.  ¨ Blood in your urine. · You are dizzy or lightheaded, or you feel like you may faint. Watch closely for changes in your health, and be sure to contact your doctor if:  · You are not getting better after 1 day (24 hours). Where can you learn more? Go to http://diego-beatriz.info/. Enter O059 in the search box to learn more about \"Abdominal Pain: Care Instructions. \"  Current as of: March 20, 2017  Content Version: 11.3  © 4576-9876 [employer]. Care instructions adapted under license by [employer] (which disclaims liability or warranty for this information). If you have questions about a medical condition or this instruction, always ask your healthcare professional. [place] [address] any warranty or liability for your use of this information.

## 2017-09-07 ENCOUNTER — OFFICE VISIT (OUTPATIENT)
Dept: FAMILY MEDICINE CLINIC | Age: 67
End: 2017-09-07

## 2017-09-07 ENCOUNTER — HOSPITAL ENCOUNTER (OUTPATIENT)
Dept: LAB | Age: 67
Discharge: HOME | End: 2017-09-07
Payer: MEDICARE

## 2017-09-07 VITALS
HEART RATE: 91 BPM | TEMPERATURE: 97.3 F | RESPIRATION RATE: 16 BRPM | WEIGHT: 223.4 LBS | DIASTOLIC BLOOD PRESSURE: 79 MMHG | HEIGHT: 64 IN | BODY MASS INDEX: 38.14 KG/M2 | SYSTOLIC BLOOD PRESSURE: 122 MMHG

## 2017-09-07 DIAGNOSIS — R10.13 EPIGASTRIC PAIN: Primary | ICD-10-CM

## 2017-09-07 DIAGNOSIS — R10.13 EPIGASTRIC PAIN: ICD-10-CM

## 2017-09-07 LAB
ALBUMIN SERPL-MCNC: 4 G/DL (ref 3.4–5)
ALBUMIN/GLOB SERPL: 1.1 {RATIO} (ref 0.8–1.7)
ALP SERPL-CCNC: 98 U/L (ref 45–117)
ALT SERPL-CCNC: 19 U/L (ref 13–56)
ANION GAP SERPL CALC-SCNC: 9 MMOL/L (ref 3–18)
AST SERPL-CCNC: 13 U/L (ref 15–37)
BASOPHILS # BLD: 0 K/UL (ref 0–0.06)
BASOPHILS NFR BLD: 0 % (ref 0–2)
BILIRUB SERPL-MCNC: 0.4 MG/DL (ref 0.2–1)
BUN SERPL-MCNC: 26 MG/DL (ref 7–18)
BUN/CREAT SERPL: 20 (ref 12–20)
CALCIUM SERPL-MCNC: 9.2 MG/DL (ref 8.5–10.1)
CHLORIDE SERPL-SCNC: 104 MMOL/L (ref 100–108)
CO2 SERPL-SCNC: 27 MMOL/L (ref 21–32)
CREAT SERPL-MCNC: 1.27 MG/DL (ref 0.6–1.3)
DIFFERENTIAL METHOD BLD: ABNORMAL
EOSINOPHIL # BLD: 0.1 K/UL (ref 0–0.4)
EOSINOPHIL NFR BLD: 1 % (ref 0–5)
ERYTHROCYTE [DISTWIDTH] IN BLOOD BY AUTOMATED COUNT: 15.9 % (ref 11.6–14.5)
GLOBULIN SER CALC-MCNC: 3.6 G/DL (ref 2–4)
GLUCOSE SERPL-MCNC: 86 MG/DL (ref 74–99)
HCT VFR BLD AUTO: 32 % (ref 35–45)
HGB BLD-MCNC: 10.1 G/DL (ref 12–16)
LIPASE SERPL-CCNC: 193 U/L (ref 73–393)
LYMPHOCYTES # BLD: 2.6 K/UL (ref 0.9–3.6)
LYMPHOCYTES NFR BLD: 28 % (ref 21–52)
MCH RBC QN AUTO: 22.1 PG (ref 24–34)
MCHC RBC AUTO-ENTMCNC: 31.6 G/DL (ref 31–37)
MCV RBC AUTO: 70.2 FL (ref 74–97)
MONOCYTES # BLD: 0.6 K/UL (ref 0.05–1.2)
MONOCYTES NFR BLD: 6 % (ref 3–10)
NEUTS SEG # BLD: 6 K/UL (ref 1.8–8)
NEUTS SEG NFR BLD: 65 % (ref 40–73)
PLATELET # BLD AUTO: 214 K/UL (ref 135–420)
PMV BLD AUTO: 10.2 FL (ref 9.2–11.8)
POTASSIUM SERPL-SCNC: 4.5 MMOL/L (ref 3.5–5.5)
PROT SERPL-MCNC: 7.6 G/DL (ref 6.4–8.2)
RBC # BLD AUTO: 4.56 M/UL (ref 4.2–5.3)
SODIUM SERPL-SCNC: 140 MMOL/L (ref 136–145)
WBC # BLD AUTO: 9.3 K/UL (ref 4.6–13.2)

## 2017-09-07 PROCEDURE — 36415 COLL VENOUS BLD VENIPUNCTURE: CPT | Performed by: INTERNAL MEDICINE

## 2017-09-07 PROCEDURE — 80053 COMPREHEN METABOLIC PANEL: CPT | Performed by: INTERNAL MEDICINE

## 2017-09-07 PROCEDURE — 83690 ASSAY OF LIPASE: CPT | Performed by: INTERNAL MEDICINE

## 2017-09-07 PROCEDURE — 85025 COMPLETE CBC W/AUTO DIFF WBC: CPT | Performed by: INTERNAL MEDICINE

## 2017-09-07 NOTE — PROGRESS NOTES
1. Have you been to the ER, urgent care clinic since your last visit? Hospitalized since your last visit? No.     2. Have you seen or consulted any other health care providers outside of the [address] [place] [address] since your last visit? Include any pap smears or colon screening. No.    Patient presents with nausea, dizziness, abdominal pain and low energy.

## 2017-09-07 NOTE — MR AVS SNAPSHOT
Visit Information Date & Time Provider Department Dept. Phone Encounter #  
 9/7/2017  2:00 PM Patsy Harding, [address] [facility] [phone] [id number] Follow-up Instructions Return in about 1 week (around 9/14/2017). Your Appointments 11/9/2017  8:15 AM  
Office Visit with Patsy Harding MD  
[facility] [address] [facility]) Appt Note: mwv  
 [address] [place]  
  
   
 [address] Upcoming Health Maintenance Date Due Pneumococcal 65+ High/Highest Risk (1 of 2 - PCV13) 10/5/2017* DTaP/Tdap/Td series (1 - Tdap) 10/18/2017* INFLUENZA AGE 9 TO ADULT 10/18/2017* MEDICARE YEARLY EXAM 11/9/2017 BREAST CANCER SCRN MAMMOGRAM 10/13/2018 GLAUCOMA SCREENING Q2Y 6/13/2019 COLONOSCOPY 11/4/2024 *Topic was postponed. The date shown is not the original due date. Allergies as of 9/7/2017  Review Complete On: 9/7/2017 By: Patsy Harding MD  
  
 Severity Noted Reaction Type Reactions Penicillin G  09/08/2016    Hives Current Immunizations  Reviewed on 3/30/2017 Name Date Influenza Vaccine (Quad) PF 9/29/2016 Pneumococcal Polysaccharide (PPSV-23) 11/17/2015 Zoster Vaccine, Live 6/11/2013 Not reviewed this visit You Were Diagnosed With   
  
 Codes Comments Epigastric pain    -  Primary ICD-10-CM: R10.13 ICD-9-CM: 789.06 Vitals BP Pulse Temp Resp Height(growth percentile) Weight(growth percentile) 122/79 91 97.3 °F (36.3 °C) (Oral) 16 5' 4\" (1.626 m) 223 lb 6.4 oz (101.3 kg) BMI OB Status Smoking Status 38.35 kg/m2 Hysterectomy Never Smoker BMI and BSA Data Body Mass Index Body Surface Area  
 38.35 kg/m 2 2.14 m 2 Preferred Pharmacy Pharmacy Name Phone [facility] [address] [address] [phone] Your Updated Medication List  
  
   
This list is accurate as of: 9/7/17  2:35 PM.  Always use your most recent med list.  
  
  
  
  
 albuterol 90 mcg/actuation inhaler Commonly known as:  PROVENTIL HFA, VENTOLIN HFA, PROAIR HFA Take 2 Puffs by inhalation every six (6) hours as needed for Wheezing. amLODIPine 5 mg tablet Commonly known as:  Filiberto Matar Take 1 Tab by mouth daily. AYR SALINE 0.65 % nasal spray Generic drug:  sodium chloride 1 Spray as needed for Congestion. budesonide-formoterol 80-4.5 mcg/actuation Hfaa inhaler Commonly known as:  SYMBICORT Take 2 Puffs by inhalation two (2) times a day. Use for 2 weeks at a time. CALCIUM 600 + D 600-125 mg-unit Tab Generic drug:  calcium-cholecalciferol (d3) Take 600 mg by mouth daily. COMBIGAN 0.2-0.5 % Drop ophthalmic solution Generic drug:  brimonidine-timolol Administer 1 Drop to left eye daily. folic acid 1 mg tablet Commonly known as:  Google Take  by mouth daily. ondansetron 4 mg disintegrating tablet Commonly known as:  ZOFRAN ODT Take 1 Tab by mouth every eight (8) hours as needed for Nausea. Peak Flow Meter Bridgette  
1 Units by Does Not Apply route daily. STOOL SOFTENER 100 mg capsule Generic drug:  docusate sodium Take 100 mg by mouth daily. tiZANidine 2 mg tablet Commonly known as:  Julia Olp Take 1 tablet 3 times a day as needed TYLENOL EXTRA STRENGTH 500 mg tablet Generic drug:  acetaminophen Take 500 mg by mouth every four (4) hours as needed for Pain.  
  
 valsartan-hydroCHLOROthiazide 80-12.5 mg per tablet Commonly known as:  DIOVAN-HCT Take 1 Tab by mouth two (2) times a day. XARELTO 20 mg Tab tablet Generic drug:  rivaroxaban Take  by mouth daily. ZANTAC 150 mg tablet Generic drug:  raNITIdine Take  by mouth as needed for Indigestion. ZyrTEC 10 mg Cap Generic drug:  Cetirizine Take 10 mg by mouth as needed. Follow-up Instructions Return in about 1 week (around 9/14/2017). To-Do List   
 09/07/2017 Imaging:  CT ABD PELV W WO CONT Referral Information Referral ID Referred By Referred To  
  
 [id number] ADALID Knight Not Available Visits Status Start Date End Date 1 New Request 9/7/17 9/7/18 If your referral has a status of pending review or denied, additional information will be sent to support the outcome of this decision. Patient Instructions Abdominal Pain: Care Instructions Your Care Instructions Abdominal pain has many possible causes. Some aren't serious and get better on their own in a few days. Others need more testing and treatment. If your pain continues or gets worse, you need to be rechecked and may need more tests to find out what is wrong. You may need surgery to correct the problem. Don't ignore new symptoms, such as fever, nausea and vomiting, urination problems, pain that gets worse, and dizziness. These may be signs of a more serious problem. Your doctor may have recommended a follow-up visit in the next 8 to 12 hours. If you are not getting better, you may need more tests or treatment. The doctor has checked you carefully, but problems can develop later. If you notice any problems or new symptoms, get medical treatment right away. Follow-up care is a key part of your treatment and safety. Be sure to make and go to all appointments, and call your doctor if you are having problems. It's also a good idea to know your test results and keep a list of the medicines you take. How can you care for yourself at home? · Rest until you feel better. · To prevent dehydration, drink plenty of fluids, enough so that your urine is light yellow or clear like water. Choose water and other caffeine-free clear liquids until you feel better.  If you have kidney, heart, or liver disease and have to limit fluids, talk with your doctor before you increase the amount of fluids you drink. · If your stomach is upset, eat mild foods, such as rice, dry toast or crackers, bananas, and applesauce. Try eating several small meals instead of two or three large ones. · Wait until 48 hours after all symptoms have gone away before you have spicy foods, alcohol, and drinks that contain caffeine. · Do not eat foods that are high in fat. · Avoid anti-inflammatory medicines such as aspirin, ibuprofen (Advil, Motrin), and naproxen (Aleve). These can cause stomach upset. Talk to your doctor if you take daily aspirin for another health problem. When should you call for help? Call 911 anytime you think you may need emergency care. For example, call if: 
· You passed out (lost consciousness). · You pass maroon or very bloody stools. · You vomit blood or what looks like coffee grounds. · You have new, severe belly pain. Call your doctor now or seek immediate medical care if: 
· Your pain gets worse, especially if it becomes focused in one area of your belly. · You have a new or higher fever. · Your stools are black and look like tar, or they have streaks of blood. · You have unexpected vaginal bleeding. · You have symptoms of a urinary tract infection. These may include: 
¨ Pain when you urinate. ¨ Urinating more often than usual. 
¨ Blood in your urine. · You are dizzy or lightheaded, or you feel like you may faint. Watch closely for changes in your health, and be sure to contact your doctor if: 
· You are not getting better after 1 day (24 hours). Where can you learn more? Go to http://diego-beatriz.info/. Enter B610 in the search box to learn more about \"Abdominal Pain: Care Instructions. \" Current as of: March 20, 2017 Content Version: 11.3 © 9611-0765 [employer].  Care instructions adapted under license by [address] (which disclaims liability or warranty for this information). If you have questions about a medical condition or this instruction, always ask your healthcare professional. [address] any warranty or liability for your use of this information. Introducing [facility]! Dear Baljinder Escudero: Thank you for requesting a [employer] account. Our records indicate that you already have an active [email or web address] account. You can access your account anytime at [email or web address] Did you know that you can access your hospital and ER discharge instructions at any time in [email or web address]? You can also review all of your test results from your hospital stay or ER visit. Additional Information If you have questions, please visit the Frequently Asked Questions section of the [email or web address] website at [email or web address] Remember, [email or web address].com is NOT to be used for urgent needs. For medical emergencies, dial 911. Now available from your iPhone and Android! Please provide this summary of care documentation to your next provider. Your primary care clinician is listed as JCARLOS Javier. If you have any questions after today's visit, please call [phone].

## 2017-09-07 NOTE — PATIENT INSTRUCTIONS

## 2017-09-07 NOTE — PROGRESS NOTES
Tawana Soulier is a 74 y.o. female and presents with Dizziness; Nausea; and Abdominal Pain       Subjective:    Having abd pain -since July. Upper abdomen bilaterally. Sharp pulling pain still. Pain lasts most of day now.  +nausea- constant, not related to eating. Having bm daily but smaller than usual. No emesis. No melena. No hematochezia. No raw food. No travel. No pets. No new meds. S/p cholecystectomy. Assessment/Plan:    abd pain- unclear etiology- adhesions/PSBO vs colitis vs pancreatitis vs PUD/gastritis vs other possible. vital signs are reassuring again today although systolic pressure is still slightly lower than normal.  No fever. Will repeat a CMP and CBC today and plan to do a CT scan of the abdomen. The patient will let us know if she develops any stool changes such as diarrhea or black tarry sticky stools. 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 continue Zofran for her nausea. She will go to the emergency room for any high fevers or dramatic new symptoms    RTC in 1 week. Orders Placed This Encounter    CT ABD PELV W WO CONT     Standing Status:   Future     Standing Expiration Date:   10/7/2018     Scheduling Instructions:      [facility]     Order Specific Question:   Is Patient Allergic to Contrast Dye? Answer:   No     Order Specific Question:   Stat POC Creatinine as needed for Radiology Policy     Answer: Yes    METABOLIC PANEL, COMPREHENSIVE     Standing Status:   Future     Standing Expiration Date:   9/8/2018    CBC WITH AUTOMATED DIFF     Standing Status:   Future     Standing Expiration Date:   9/8/2018    LIPASE     Standing Status:   Future     Standing Expiration Date:   9/8/2018   Diagnoses and all orders for this visit:    1. Epigastric pain  -     METABOLIC PANEL, COMPREHENSIVE; Future  -     CBC WITH AUTOMATED DIFF; Future  -     LIPASE;  Future  -     CT ABD PELV W WO CONT; Future          ROS:  Negative except as mentioned above  Cardiac- no chest pain or palpitations  Pulmonary- no sob or wheezes  GI- no n/v or diarrhea. SH:  Social History   Substance Use Topics    Smoking status: Never Smoker    Smokeless tobacco: Never Used    Alcohol use No         Medications/Allergies:  Current Outpatient Prescriptions on File Prior to Visit   Medication Sig Dispense Refill    ondansetron (ZOFRAN ODT) 4 mg disintegrating tablet Take 1 Tab by mouth every eight (8) hours as needed for Nausea. 30 Tab 0    budesonide-formoterol (SYMBICORT) 80-4.5 mcg/actuation HFAA inhaler Take 2 Puffs by inhalation two (2) times a day. Use for 2 weeks at a time. 1 Inhaler 1    azithromycin (ZITHROMAX) 250 mg tablet Take two tablets today then one tablet daily 6 Tab 0    Peak Flow Meter lanette 1 Units by Does Not Apply route daily. 1 Device 0    guaiFENesin-codeine (ROBITUSSIN AC) 100-10 mg/5 mL solution Take 5 mL by mouth three (3) times daily as needed for Cough. Max Daily Amount: 15 mL. 118 Bottle 0    albuterol (PROVENTIL HFA, VENTOLIN HFA, PROAIR HFA) 90 mcg/actuation inhaler Take 2 Puffs by inhalation every six (6) hours as needed for Wheezing. 1 Inhaler 2    amLODIPine (NORVASC) 5 mg tablet Take 1 Tab by mouth daily. 30 Tab 5    valsartan-hydroCHLOROthiazide (DIOVAN-HCT) 80-12.5 mg per tablet Take 1 Tab by mouth two (2) times a day. 60 Tab 5    tiZANidine (ZANAFLEX) 2 mg tablet Take 1 tablet 3 times a day as needed 30 Tab 0    acetaminophen (TYLENOL EXTRA STRENGTH) 500 mg tablet Take 500 mg by mouth every four (4) hours as needed for Pain.  rivaroxaban (XARELTO) 20 mg tab tablet Take  by mouth daily.  folic acid (FOLVITE) 1 mg tablet Take  by mouth daily.  brimonidine-timolol (COMBIGAN) 0.2-0.5 % drop ophthalmic solution Administer 1 Drop to left eye daily.  ranitidine (ZANTAC) 150 mg tablet Take  by mouth as needed for Indigestion.  Cetirizine (ZYRTEC) 10 mg cap Take 10 mg by mouth as needed.       calcium-cholecalciferol, d3, (CALCIUM 600 + D) 600-125 mg-unit tab Take 600 mg by mouth daily.  docusate sodium (STOOL SOFTENER) 100 mg capsule Take 100 mg by mouth daily.  sodium chloride (AYR SALINE) 0.65 % nasal spray 1 Spray as needed for Congestion. No current facility-administered medications on file prior to visit. Allergies   Allergen Reactions    Penicillin G Hives       Objective:  Visit Vitals    /79    Pulse 91    Temp 97.3 °F (36.3 °C) (Oral)    Resp 16    Ht 5' 4\" (1.626 m)    Wt 223 lb 6.4 oz (101.3 kg)    BMI 38.35 kg/m2    Body mass index is 38.35 kg/(m^2). Constitutional: Well developed, nourished, no distress, alert   Eyes: Conjunctiva normal.   CV: S1, S2.  RRR. No murmurs/rubs. No thrills palpated. No carotid bruits. Intact distal pulses. No edema. Pulm: No abnormalities on inspection. Clear to auscultation bilaterally. No wheezing/rhonchi. Normal effort. GI: Soft, nontender, nondistended. Normal active bowel sounds. No  masses on palpation. No hepatosplenomegaly.

## 2017-09-12 ENCOUNTER — DOCUMENTATION ONLY (OUTPATIENT)
Dept: FAMILY MEDICINE CLINIC | Age: 67
End: 2017-09-12

## 2017-09-15 ENCOUNTER — OFFICE VISIT (OUTPATIENT)
Dept: FAMILY MEDICINE CLINIC | Age: 67
End: 2017-09-15

## 2017-09-15 VITALS
TEMPERATURE: 97.3 F | HEART RATE: 73 BPM | WEIGHT: 221 LBS | SYSTOLIC BLOOD PRESSURE: 156 MMHG | DIASTOLIC BLOOD PRESSURE: 83 MMHG | BODY MASS INDEX: 37.73 KG/M2 | HEIGHT: 64 IN | RESPIRATION RATE: 16 BRPM

## 2017-09-15 DIAGNOSIS — Z86.2 HISTORY OF SARCOIDOSIS: ICD-10-CM

## 2017-09-15 DIAGNOSIS — N28.89 KIDNEY MASS: ICD-10-CM

## 2017-09-15 DIAGNOSIS — J06.9 VIRAL UPPER RESPIRATORY TRACT INFECTION: ICD-10-CM

## 2017-09-15 DIAGNOSIS — K86.89 PANCREATIC STONES: Primary | ICD-10-CM

## 2017-09-15 NOTE — MR AVS SNAPSHOT
Visit Information Date & Time Provider Department Dept. Phone Encounter #  
 9/15/2017  9:15 AM Onel Gonzales, [address] [facility] [phone] [id number] Follow-up Instructions Return in about 4 weeks (around 10/13/2017) for 30 minute slot. Follow-up and Disposition History Upcoming Health Maintenance Date Due Pneumococcal 65+ High/Highest Risk (1 of 2 - PCV13) 10/5/2017* DTaP/Tdap/Td series (1 - Tdap) 10/18/2017* INFLUENZA AGE 9 TO ADULT 10/18/2017* MEDICARE YEARLY EXAM 11/9/2017 BREAST CANCER SCRN MAMMOGRAM 10/13/2018 GLAUCOMA SCREENING Q2Y 6/13/2019 COLONOSCOPY 11/4/2024 *Topic was postponed. The date shown is not the original due date. Allergies as of 9/15/2017  Review Complete On: 9/15/2017 By: Onel Gonzales MD  
  
 Severity Noted Reaction Type Reactions Penicillin G  09/08/2016    Hives Current Immunizations  Reviewed on 3/30/2017 Name Date Influenza Vaccine (Quad) PF 9/29/2016 Pneumococcal Polysaccharide (PPSV-23) 11/17/2015 Zoster Vaccine, Live 6/11/2013 Not reviewed this visit You Were Diagnosed With   
  
 Codes Comments Pancreatic stones    -  Primary ICD-10-CM: K86.89 
ICD-9-CM: 577.8 Kidney mass     ICD-10-CM: N28.89 ICD-9-CM: 593.9 left History of sarcoidosis     ICD-10-CM: Z86.2 ICD-9-CM: V12.29 Viral upper respiratory tract infection     ICD-10-CM: J06.9, B97.89 ICD-9-CM: 465.9 Vitals BP Pulse Temp Resp Height(growth percentile) Weight(growth percentile) 156/83 73 97.3 °F (36.3 °C) (Oral) 16 5' 4\" (1.626 m) 221 lb (100.2 kg) BMI OB Status Smoking Status 37.93 kg/m2 Hysterectomy Never Smoker Vitals History BMI and BSA Data Body Mass Index Body Surface Area  
 37.93 kg/m 2 2.13 m 2 Preferred Pharmacy Pharmacy Name Phone [facility] [address] [phone] Your Updated Medication List  
  
   
This list is accurate as of: 9/15/17 10:31 AM.  Always use your most recent med list.  
  
  
  
  
 albuterol 90 mcg/actuation inhaler Commonly known as:  PROVENTIL HFA, VENTOLIN HFA, PROAIR HFA Take 2 Puffs by inhalation every six (6) hours as needed for Wheezing. amLODIPine 5 mg tablet Commonly known as:  [place] Take 1 Tab by mouth daily. AYR SALINE 0.65 % nasal spray Generic drug:  sodium chloride 1 Spray as needed for Congestion. budesonide-formoterol 80-4.5 mcg/actuation Hfaa inhaler Commonly known as:  SYMBICORT Take 2 Puffs by inhalation two (2) times a day. Use for 2 weeks at a time. CALCIUM 600 + D 600-125 mg-unit Tab Generic drug:  calcium-cholecalciferol (d3) Take 600 mg by mouth daily. COMBIGAN 0.2-0.5 % Drop ophthalmic solution Generic drug:  brimonidine-timolol Administer 1 Drop to left eye daily. folic acid 1 mg tablet Commonly known as:  Google Take  by mouth daily. ondansetron 4 mg disintegrating tablet Commonly known as:  ZOFRAN ODT Take 1 Tab by mouth every eight (8) hours as needed for Nausea. Peak Flow Meter Bridgette  
1 Units by Does Not Apply route daily. STOOL SOFTENER 100 mg capsule Generic drug:  docusate sodium Take 100 mg by mouth daily. tiZANidine 2 mg tablet Commonly known as:  [place] Reasons Take 1 tablet 3 times a day as needed TYLENOL EXTRA STRENGTH 500 mg tablet Generic drug:  acetaminophen Take 500 mg by mouth every four (4) hours as needed for Pain.  
  
 valsartan-hydroCHLOROthiazide 80-12.5 mg per tablet Commonly known as:  DIOVAN-HCT Take 1 Tab by mouth two (2) times a day. XARELTO 20 mg Tab tablet Generic drug:  rivaroxaban Take  by mouth daily. ZANTAC 150 mg tablet Generic drug:  raNITIdine Take  by mouth as needed for Indigestion. ZyrTEC 10 mg Cap Generic drug:  Cetirizine Take 10 mg by mouth as needed. We Performed the Following REFERRAL TO GASTROENTEROLOGY [ZKX46 Custom] Comments:  
 Please evaluate patient for pancreatic stone and possible rectal mass on CT.- had colonoscopy with Dr. Andrés WALKER in past  
 REFERRAL TO UROLOGY [WTZ982 Custom] Follow-up Instructions Return in about 4 weeks (around 10/13/2017) for 30 minute slot. To-Do List   
 09/15/2017 Lab:  URINALYSIS W/ RFLX MICROSCOPIC Referral Information Referral ID Referred By Referred To  
  
 [id number] ADALID Mejía Not Available Visits Status Start Date End Date 1 New Request 9/15/17 9/15/18 If your referral has a status of pending review or denied, additional information will be sent to support the outcome of this decision. Referral ID Referred By Referred To  
 [id number] ADALID Mejía Not Available Visits Status Start Date End Date 1 New Request 9/15/17 9/15/18 If your referral has a status of pending review or denied, additional information will be sent to support the outcome of this decision. Introducing [facility]! Dear Sadiq Gtz: Thank you for requesting a [employer] account. Our records indicate that you already have an active [employer] account. You can access your account anytime at [email or web address] Did you know that you can access your hospital and ER discharge instructions at any time in [employer]? You can also review all of your test results from your hospital stay or ER visit. Additional Information If you have questions, please visit the Frequently Asked Questions section of the [employer] website at [email or web address] Remember, [employer] is NOT to be used for urgent needs. For medical emergencies, dial 911. Now available from your iPhone and Android! Please provide this summary of care documentation to your next provider. Your primary care clinician is listed as JCARLOS Javier. If you have any questions after today's visit, please call [phone].

## 2017-09-15 NOTE — PROGRESS NOTES
Gris Candelario is a 74 y.o. female and presents with Follow Up Chronic Condition and Results (CAT scan and blood work)       Subjective:    Nausea - reviewed ct scan. Pancreatic stone and kidney mass. No emesis. Mild abd pain only. Lipase normal and no active pancreatitis on CT.   Kidney mass- suspicious for malignancy on reading  Sore throat for last several days has had some mild odynophagia. No fevers or chills no nausea vomiting. Positive postnasal drip. Assessment/Plan:      Pancreatic stone with enlarged pancreatic duct no apparent active this but this would likely be the source of her nausea. will refer to GI for additional evaluation and to determine need for intervention. Kidney mass-left mid to lower pole will refer to urology for additional evaluation. She does have a history of sarcoidosis although the imaging did not appear to be consistent with this  Rectal mass on CT scan this is not appreciated on digital exam today and no mass was seen on colonoscopy several years ago. Yahaira Quick likely viral discussed with patient she will monitor for improvement in the next several days. Discussed all of the above diagnoses with patient in detail and all questions answered. She will call immediately or go to the emergency room for worsening symptoms or difficulty getting to the referrals  RTC in 1 month  Orders Placed This Encounter    URINALYSIS W/ RFLX MICROSCOPIC     Standing Status:   Future     Standing Expiration Date:   9/16/2018    REFERRAL TO GASTROENTEROLOGY     Referral Priority:   Urgent     Referral Type:   Consultation     Referral Reason:   Specialty Services Required    REFERRAL TO UROLOGY     Referral Priority:   Urgent     Referral Type:   Consultation     Referral Reason:   Specialty Services Required   Diagnoses and all orders for this visit:    1.  Pancreatic stones  -     REFERRAL TO GASTROENTEROLOGY    2. Kidney mass  Comments:  left   Orders:  -     URINALYSIS W/ RFLX MICROSCOPIC; Future  -     REFERRAL TO UROLOGY    3. History of sarcoidosis    4. Viral upper respiratory tract infection              ROS:  Negative except as mentioned above  Cardiac- no chest pain or palpitations  Pulmonary- no sob or wheezes  GI- no n/v or diarrhea. SH:  Social History   Substance Use Topics    Smoking status: Never Smoker    Smokeless tobacco: Never Used    Alcohol use No         Medications/Allergies:  Current Outpatient Prescriptions on File Prior to Visit   Medication Sig Dispense Refill    ondansetron (ZOFRAN ODT) 4 mg disintegrating tablet Take 1 Tab by mouth every eight (8) hours as needed for Nausea. 30 Tab 0    budesonide-formoterol (SYMBICORT) 80-4.5 mcg/actuation HFAA inhaler Take 2 Puffs by inhalation two (2) times a day. Use for 2 weeks at a time. 1 Inhaler 1    Peak Flow Meter lanette 1 Units by Does Not Apply route daily. 1 Device 0    albuterol (PROVENTIL HFA, VENTOLIN HFA, PROAIR HFA) 90 mcg/actuation inhaler Take 2 Puffs by inhalation every six (6) hours as needed for Wheezing. 1 Inhaler 2    amLODIPine (NORVASC) 5 mg tablet Take 1 Tab by mouth daily. 30 Tab 5    valsartan-hydroCHLOROthiazide (DIOVAN-HCT) 80-12.5 mg per tablet Take 1 Tab by mouth two (2) times a day. 60 Tab 5    tiZANidine (ZANAFLEX) 2 mg tablet Take 1 tablet 3 times a day as needed 30 Tab 0    acetaminophen (TYLENOL EXTRA STRENGTH) 500 mg tablet Take 500 mg by mouth every four (4) hours as needed for Pain.  rivaroxaban (XARELTO) 20 mg tab tablet Take  by mouth daily.  folic acid (FOLVITE) 1 mg tablet Take  by mouth daily.  brimonidine-timolol (COMBIGAN) 0.2-0.5 % drop ophthalmic solution Administer 1 Drop to left eye daily.  ranitidine (ZANTAC) 150 mg tablet Take  by mouth as needed for Indigestion.  Cetirizine (ZYRTEC) 10 mg cap Take 10 mg by mouth as needed.  calcium-cholecalciferol, d3, (CALCIUM 600 + D) 600-125 mg-unit tab Take 600 mg by mouth daily.       docusate sodium (STOOL SOFTENER) 100 mg capsule Take 100 mg by mouth daily.  sodium chloride (AYR SALINE) 0.65 % nasal spray 1 Spray as needed for Congestion. No current facility-administered medications on file prior to visit. Allergies   Allergen Reactions    Penicillin G Hives       Objective:  Visit Vitals    /83    Pulse 73    Temp 97.3 °F (36.3 °C) (Oral)    Resp 16    Ht 5' 4\" (1.626 m)    Wt 221 lb (100.2 kg)    BMI 37.93 kg/m2    Body mass index is 37.93 kg/(m^2). Constitutional: Well developed, nourished, no distress, alert   HENT: Supple neck. No thyromegaly or lymphadenopathy. Oropharynx with mild  erythema but no exudates and moist mucous membranes. rectal + external hemorrhoids, normal sphincter tone, no stool in vault, no appreciable masses   CV: S1, S2.  RRR. No murmurs/rubs. No thrills palpated. No carotid bruits. Intact distal pulses. No edema. Pulm: No abnormalities on inspection. Clear to auscultation bilaterally. No wheezing/rhonchi. Normal effort. GI: Soft, nontender, nondistended. Normal active bowel sounds. No  masses on palpation. No hepatosplenomegaly.

## 2017-09-15 NOTE — PROGRESS NOTES
1. Have you been to the ER, urgent care clinic since your last visit? Hospitalized since your last visit? No.    2. Have you seen or consulted any other health care providers outside of the [address] [place] [address] since your last visit? Include any pap smears or colon screening. No.    Patient presents with follow up CAT scan and lab results. Patient presents with nausea and constipation.

## 2017-09-28 ENCOUNTER — TELEPHONE (OUTPATIENT)
Dept: FAMILY MEDICINE CLINIC | Age: 67
End: 2017-09-28

## 2017-09-28 NOTE — TELEPHONE ENCOUNTER
Ms hutchins calling because she is having procedures done10/4 egd, 11/02 endoscopic at [facility] and 11/9 a sigmoidoscopy. They are wanting her to find out about stopping her xarelto the day before. She needs a call back to discuss. Home phone is [phone] and cell is [phone].  thanks

## 2017-09-29 ENCOUNTER — DOCUMENTATION ONLY (OUTPATIENT)
Dept: FAMILY MEDICINE CLINIC | Age: 67
End: 2017-09-29

## 2017-10-12 ENCOUNTER — OFFICE VISIT (OUTPATIENT)
Dept: FAMILY MEDICINE CLINIC | Age: 67
End: 2017-10-12

## 2017-10-12 VITALS
BODY MASS INDEX: 37.8 KG/M2 | HEIGHT: 64 IN | OXYGEN SATURATION: 97 % | TEMPERATURE: 98.9 F | DIASTOLIC BLOOD PRESSURE: 77 MMHG | WEIGHT: 221.4 LBS | RESPIRATION RATE: 16 BRPM | HEART RATE: 79 BPM | SYSTOLIC BLOOD PRESSURE: 153 MMHG

## 2017-10-12 DIAGNOSIS — D57.40 SICKLE CELL BETA THALASSEMIA (HCC): ICD-10-CM

## 2017-10-12 DIAGNOSIS — E66.9 OBESITY (BMI 30-39.9): ICD-10-CM

## 2017-10-12 DIAGNOSIS — Z86.2 HISTORY OF SARCOIDOSIS: ICD-10-CM

## 2017-10-12 DIAGNOSIS — I45.10 RIGHT BUNDLE BRANCH BLOCK (RBBB) ON ELECTROCARDIOGRAM (ECG): ICD-10-CM

## 2017-10-12 DIAGNOSIS — Z23 ENCOUNTER FOR IMMUNIZATION: ICD-10-CM

## 2017-10-12 DIAGNOSIS — Z86.718 PERSONAL HISTORY OF DVT (DEEP VEIN THROMBOSIS): ICD-10-CM

## 2017-10-12 DIAGNOSIS — Z01.818 PREOPERATIVE CLEARANCE: Primary | ICD-10-CM

## 2017-10-12 NOTE — PATIENT INSTRUCTIONS
How to Prepare for Surgery  How do you prepare for surgery? Surgery can be stressful. This information will help you understand what you can expect. And it will help you safely prepare for surgery. Follow-up care is a key part of your treatment and safety. Be sure to make and go to all appointments, and call your doctor if you are having problems. It's also a good idea to know your test results and keep a list of the medicines you take. What happens before surgery? Preparing for surgery  · Understand exactly what surgery is planned, along with the risks, benefits, and other options. · Tell your doctors ALL the medicines, vitamins, supplements, and herbal remedies you take. Some of these can increase the risk of bleeding or interact with anesthesia. · If you take blood thinners, such as warfarin (Coumadin), clopidogrel (Plavix), or aspirin, be sure to talk to your doctor. He or she will tell you if you should stop taking these medicines before your surgery. Make sure that you understand exactly what your doctor wants you to do. · Your doctor will tell you which medicines to take or stop before your surgery. You may need to stop taking certain medicines a week or more before surgery. So talk to your doctor as soon as you can. · If you have an advance directive, let your doctor know. It may include a living will and a durable power of  for health care. Bring a copy to the hospital. If don't have one, you may want to prepare one. It lets your doctor and loved ones know your health care wishes. Doctors advise that everyone prepare these papers before any type of surgery or procedure. What happens on the day of surgery? · Follow the instructions about when to stop eating and drinking. If you don't, your surgery may be canceled. If your doctor told you to take your medicines on the day of surgery, take them with only a sip of water. · Take a bath or shower before coming in for your surgery.  Do not apply lotions, perfumes, deodorants, or nail polish. · Do not shave the surgical site yourself. · Take off all jewelry and piercings. And take out contact lenses, if you wear them. At the hospital or surgery center  · Bring a picture ID. · The area for surgery is often marked to make sure there are no errors. · You will be kept comfortable and safe by your anesthesia provider. The anesthesia may make you sleep. Or it may just numb the area being worked on. Going home  · Be sure you have someone to drive you home. Anesthesia and pain medicine make it unsafe for you to drive. · You will be given more specific instructions about recovering from your surgery. They will cover things like diet, wound care, follow-up care, driving, and getting back to your normal routine. When should you call your doctor? · You have questions or concerns. · You don't understand how to prepare for your surgery. · You become ill before the surgery (such as fever, flu, or a cold). · You need to reschedule or have changed your mind about having the surgery. Where can you learn more? Go to http://diegoWeizoombeatriz.info/. Enter Q270 in the search box to learn more about \"How to Prepare for Surgery. \"  Current as of: August 14, 2016  Content Version: 11.3  © 1451-5188 [employer]. Care instructions adapted under license by [employer] (which disclaims liability or warranty for this information). If you have questions about a medical condition or this instruction, always ask your healthcare professional. [place] [address] any warranty or liability for your use of this information. Vaccine Information Statement    Influenza (Flu) Vaccine (Inactivated or Recombinant): What you need to know    Many Vaccine Information Statements are available in Ni[place]an and other languages.  See www.immunize.org/vis  Hojas de Información Sobre Vacunas están disponibles en Español y en muchos otros zoran. Visite www.immunize.org/vis    1. Why get vaccinated? Influenza (flu) is a contagious disease that spreads around the [place] every year, usually between October and May. Flu is caused by influenza viruses, and is spread mainly by coughing, sneezing, and close contact. Anyone can get flu. Flu strikes suddenly and can last several days. Symptoms vary by age, but can include:   fever/chills   sore throat   muscle aches   fatigue   cough   headache    runny or stuffy nose    Flu can also lead to pneumonia and blood infections, and cause diarrhea and seizures in children. If you have a medical condition, such as heart or lung disease, flu can make it worse. Flu is more dangerous for some people. Infants and young children, people 72years of age and older, pregnant women, and people with certain health conditions or a weakened immune system are at greatest risk. Each year thousands of people in the [facility] die from flu, and many more are hospitalized. Flu vaccine can:   keep you from getting flu,   make flu less severe if you do get it, and   keep you from spreading flu to your family and other people. 2. Inactivated and recombinant flu vaccines    A dose of flu vaccine is recommended every flu season. Children 6 months through 6years of age may need two doses during the same flu season. Everyone else needs only one dose each flu season. Some inactivated flu vaccines contain a very small amount of a mercury-based preservative called thimerosal. Studies have not shown thimerosal in vaccines to be harmful, but flu vaccines that do not contain thimerosal are available. There is no live flu virus in flu shots. They cannot cause the flu. There are many flu viruses, and they are always changing. Each year a new flu vaccine is made to protect against three or four viruses that are likely to cause disease in the upcoming flu season.  But even when the vaccine doesnt exactly match these viruses, it may still provide some protection    Flu vaccine cannot prevent:   flu that is caused by a virus not covered by the vaccine, or   illnesses that look like flu but are not. It takes about 2 weeks for protection to develop after vaccination, and protection lasts through the flu season. 3. Some people should not get this vaccine    Tell the person who is giving you the vaccine:     If you have any severe, life-threatening allergies. If you ever had a life-threatening allergic reaction after a dose of flu vaccine, or have a severe allergy to any part of this vaccine, you may be advised not to get vaccinated. Most, but not all, types of flu vaccine contain a small amount of egg protein.  If you ever had Guillain-Barré Syndrome (also called GBS). Some people with a history of GBS should not get this vaccine. This should be discussed with your doctor.  If you are not feeling well. It is usually okay to get flu vaccine when you have a mild illness, but you might be asked to come back when you feel better. 4. Risks of a vaccine reaction    With any medicine, including vaccines, there is a chance of reactions. These are usually mild and go away on their own, but serious reactions are also possible. Most people who get a flu shot do not have any problems with it. Minor problems following a flu shot include:    soreness, redness, or swelling where the shot was given     hoarseness   sore, red or itchy eyes   cough   fever   aches   headache   itching   fatigue  If these problems occur, they usually begin soon after the shot and last 1 or 2 days. More serious problems following a flu shot can include the following:     There may be a small increased risk of Guillain-Barré Syndrome (GBS) after inactivated flu vaccine. This risk has been estimated at 1 or 2 additional cases per million people vaccinated.  This is much lower than the risk of severe complications from flu, which can be prevented by flu vaccine.  Young children who get the flu shot along with pneumococcal vaccine (PCV13) and/or DTaP vaccine at the same time might be slightly more likely to have a seizure caused by fever. Ask your doctor for more information. Tell your doctor if a child who is getting flu vaccine has ever had a seizure. Problems that could happen after any injected vaccine:      People sometimes faint after a medical procedure, including vaccination. Sitting or lying down for about 15 minutes can help prevent fainting, and injuries caused by a fall. Tell your doctor if you feel dizzy, or have vision changes or ringing in the ears.  Some people get severe pain in the shoulder and have difficulty moving the arm where a shot was given. This happens very rarely.  Any medication can cause a severe allergic reaction. Such reactions from a vaccine are very rare, estimated at about 1 in a million doses, and would happen within a few minutes to a few hours after the vaccination. As with any medicine, there is a very remote chance of a vaccine causing a serious injury or death. The safety of vaccines is always being monitored. For more information, visit: www.cdc.gov/vaccinesafety/    5. What if there is a serious reaction? What should I look for?  Look for anything that concerns you, such as signs of a severe allergic reaction, very high fever, or unusual behavior. Signs of a severe allergic reaction can include hives, swelling of the face and throat, difficulty breathing, a fast heartbeat, dizziness, and weakness - usually within a few minutes to a few hours after the vaccination. What should I do?  If you think it is a severe allergic reaction or other emergency that cant wait, call 9-1-1 and get the person to the nearest hospital. Otherwise, call your doctor.      Reactions should be reported to the Vaccine Adverse Event Reporting System (VAERS). Your doctor should file this report, or you can do it yourself through  the VAERS web site at www.vaers. [facility].gov, or by calling 9-871.750.4548. VAERS does not give medical advice. 6. The National Vaccine Injury Compensation Program    The [facility] Vaccine Injury Compensation Program (VICP) is a federal program that was created to compensate people who may have been injured by certain vaccines. Persons who believe they may have been injured by a vaccine can learn about the program and about filing a claim by calling 8-711.353.3727 or visiting the [employer] website at www.[facility].gov/vaccinecompensation. There is a time limit to file a claim for compensation. 7. How can I learn more?  Ask your healthcare provider. He or she can give you the vaccine package insert or suggest other sources of information.  Call your local or state health department.  Contact the Centers for Disease Control and Prevention (CDC):  - Call 7-498.428.3336 (1-800-CDC-INFO) or  - Visit CDCs website at www.cdc.gov/flu    Vaccine Information Statement   Inactivated Influenza Vaccine   8/7/2015  [address] Cushing [id number]-26    Department of Health and Human Services  Centers for Disease Control and Prevention    Office Use Only

## 2017-10-12 NOTE — PROGRESS NOTES
I faxed her Pre-op notes and her last lab results from 9/7/2017 to Dr. Diego Bell (Zoie Para) today 10/12/2017 fax [phone].

## 2017-10-12 NOTE — MR AVS SNAPSHOT
Visit Information Date & Time Provider Department Dept. Phone Encounter #  
 10/12/2017 10:45 AM Karissa Bundy, [facility] [phone] [id number] Follow-up Instructions Return cancel visit 10/19/17 and keep November f/u. Your Appointments 10/19/2017 10:30 AM  
Follow Up with MD Frandy Mcdonald 23 ([address]) Appt Note: 1 month fu appt Mkmocheryl Trae. [address]  
  
   
 [address]  
  
    
 11/9/2017 12:45 PM  
Office Visit with MD Frandy Mcdonald 23 [address]) Appt Note: [id number] [id number]  
 Mkmocheryl Trae. [address]  
  
   
 [address] Upcoming Health Maintenance Date Due Pneumococcal 65+ High/Highest Risk (1 of 2 - PCV13) 7/3/2017 DTaP/Tdap/Td series (1 - Tdap) 10/18/2017* INFLUENZA AGE 9 TO ADULT 10/18/2017* MEDICARE YEARLY EXAM 11/9/2017 BREAST CANCER SCRN MAMMOGRAM 10/13/2018 GLAUCOMA SCREENING Q2Y 6/13/2019 COLONOSCOPY 11/4/2024 *Topic was postponed. The date shown is not the original due date. Allergies as of 10/12/2017  Review Complete On: 10/12/2017 By: Karissa Bundy MD  
  
 Severity Noted Reaction Type Reactions Penicillin G  09/08/2016    Hives Current Immunizations  Reviewed on 3/30/2017 Name Date Influenza High Dose Vaccine PF  Incomplete Influenza Vaccine (Quad) PF 9/29/2016 Pneumococcal Polysaccharide (PPSV-23) 11/17/2015 Zoster Vaccine, Live 6/11/2013 Not reviewed this visit You Were Diagnosed With   
  
 Codes Comments Preoperative clearance    -  Primary ICD-10-CM: G16.527 ICD-9-CM: V72.84 Right bundle branch block (RBBB) on electrocardiogram (ECG)     ICD-10-CM: I45.10 ICD-9-CM: 426.4 Sickle cell beta thalassemia (HCC)     ICD-10-CM: D57.40 ICD-9-CM: 282.41 History of sarcoidosis     ICD-10-CM: Z86.2 ICD-9-CM: V12.29 Personal history of DVT (deep vein thrombosis)     ICD-10-CM: X17.054 ICD-9-CM: V12.51 Obesity (BMI 30-39. 9)     ICD-10-CM: E66.9 ICD-9-CM: 278.00 Encounter for immunization     ICD-10-CM: M41 ICD-9-CM: V03.89 Vitals BP Pulse Temp Resp Height(growth percentile) Weight(growth percentile) 153/77 79 98.9 °F (37.2 °C) (Oral) 16 5' 4\" (1.626 m) 221 lb 6.4 oz (100.4 kg) SpO2 BMI OB Status Smoking Status 97% 38 kg/m2 Hysterectomy Never Smoker Vitals History BMI and BSA Data Body Mass Index Body Surface Area  
 38 kg/m 2 2.13 m 2 Preferred Pharmacy Pharmacy Name Phone [facility] [address] [phone] Your Updated Medication List  
  
   
This list is accurate as of: 10/12/17 11:21 AM.  Always use your most recent med list.  
  
  
  
  
 albuterol 90 mcg/actuation inhaler Commonly known as:  PROVENTIL HFA, VENTOLIN HFA, PROAIR HFA Take 2 Puffs by inhalation every six (6) hours as needed for Wheezing. AYR SALINE 0.65 % nasal spray Generic drug:  sodium chloride 1 Spray as needed for Congestion. budesonide-formoterol 80-4.5 mcg/actuation Hfaa Commonly known as:  SYMBICORT Take 2 Puffs by inhalation two (2) times a day. Use for 2 weeks at a time. CALCIUM 600 + D 600-125 mg-unit Tab Generic drug:  calcium-cholecalciferol (d3) Take 600 mg by mouth daily. COMBIGAN 0.2-0.5 % Drop ophthalmic solution Generic drug:  brimonidine-timolol Administer 1 Drop to left eye daily. folic acid 1 mg tablet Commonly known as:  Google Take  by mouth daily. GAVISCON  mg/15 mL suspension Generic drug:  aluminum hydrox-magnesium carb Take 15 mL by mouth three (3) times daily. Peak Flow Meter Bridgette  
1 Units by Does Not Apply route daily. STOOL SOFTENER 100 mg capsule Generic drug:  docusate sodium Take 100 mg by mouth daily. tiZANidine 2 mg tablet Commonly known as:  Carol Michael Take 1 tablet 3 times a day as needed TYLENOL EXTRA STRENGTH 500 mg tablet Generic drug:  acetaminophen Take 500 mg by mouth every four (4) hours as needed for Pain.  
  
 valsartan-hydroCHLOROthiazide 80-12.5 mg per tablet Commonly known as:  DIOVAN-HCT Take 1 Tab by mouth two (2) times a day. XARELTO 20 mg Tab tablet Generic drug:  rivaroxaban Take  by mouth daily. ZANTAC 150 mg tablet Generic drug:  raNITIdine Take  by mouth as needed for Indigestion. ZyrTEC 10 mg Cap Generic drug:  Cetirizine Take 10 mg by mouth as needed. We Performed the Following INFLUENZA VIRUS VACCINE, HIGH DOSE SEASONAL, PRESERVATIVE FREE [18394 CPT(R)] Follow-up Instructions Return cancel visit 10/19/17 and keep November f/u. Patient Instructions How to Prepare for Surgery How do you prepare for surgery? Surgery can be stressful. This information will help you understand what you can expect. And it will help you safely prepare for surgery. Follow-up care is a key part of your treatment and safety. Be sure to make and go to all appointments, and call your doctor if you are having problems. It's also a good idea to know your test results and keep a list of the medicines you take. What happens before surgery? Preparing for surgery · Understand exactly what surgery is planned, along with the risks, benefits, and other options. · Tell your doctors ALL the medicines, vitamins, supplements, and herbal remedies you take. Some of these can increase the risk of bleeding or interact with anesthesia. · If you take blood thinners, such as warfarin (Coumadin), clopidogrel (Plavix), or aspirin, be sure to talk to your doctor.  He or she will tell you if you should stop taking these medicines before your surgery. Make sure that you understand exactly what your doctor wants you to do. · Your doctor will tell you which medicines to take or stop before your surgery. You may need to stop taking certain medicines a week or more before surgery. So talk to your doctor as soon as you can. · If you have an advance directive, let your doctor know. It may include a living will and a durable power of  for health care. Bring a copy to the hospital. If don't have one, you may want to prepare one. It lets your doctor and loved ones know your health care wishes. Doctors advise that everyone prepare these papers before any type of surgery or procedure. What happens on the day of surgery? · Follow the instructions about when to stop eating and drinking. If you don't, your surgery may be canceled. If your doctor told you to take your medicines on the day of surgery, take them with only a sip of water. · Take a bath or shower before coming in for your surgery. Do not apply lotions, perfumes, deodorants, or nail polish. · Do not shave the surgical site yourself. · Take off all jewelry and piercings. And take out contact lenses, if you wear them. At the hospital or surgery center · Bring a picture ID. · The area for surgery is often marked to make sure there are no errors. · You will be kept comfortable and safe by your anesthesia provider. The anesthesia may make you sleep. Or it may just numb the area being worked on. Going home · Be sure you have someone to drive you home. Anesthesia and pain medicine make it unsafe for you to drive. · You will be given more specific instructions about recovering from your surgery. They will cover things like diet, wound care, follow-up care, driving, and getting back to your normal routine. When should you call your doctor? · You have questions or concerns. · You don't understand how to prepare for your surgery. · You become ill before the surgery (such as fever, flu, or a cold). · You need to reschedule or have changed your mind about having the surgery. Where can you learn more? Go to http://diego-beatriz.info/. Enter Q270 in the search box to learn more about \"How to Prepare for Surgery. \" Current as of: August 14, 2016 Content Version: 11.3 © 0737-9540 [employer] Care instructions adapted under license by [employer] (which disclaims liability or warranty for this information). If you have questions about a medical condition or this instruction, always ask your healthcare professional. [place] [address] any warranty or liability for your use of this information. Vaccine Information Statement Influenza (Flu) Vaccine (Inactivated or Recombinant): What you need to know Many Vaccine Information Statements are available in Upper sorbian and other languages. See www.immunize.org/vis Hojas de Información Sobre Vacunas están disponibles en Español y en muchos otros idiomas. Visite www.immunize.org/vis 1. Why get vaccinated? Influenza (flu) is a contagious disease that spreads around the [place] every year, usually between October and May. Flu is caused by influenza viruses, and is spread mainly by coughing, sneezing, and close contact. Anyone can get flu. Flu strikes suddenly and can last several days. Symptoms vary by age, but can include: 
 fever/chills  sore throat  muscle aches  fatigue  cough  headache  runny or stuffy nose Flu can also lead to pneumonia and blood infections, and cause diarrhea and seizures in children. If you have a medical condition, such as heart or lung disease, flu can make it worse. Flu is more dangerous for some people.  Infants and young children, people 72years of age and older, pregnant women, and people with certain health conditions or a weakened immune system are at greatest risk. Each year thousands of people in the [facility] die from flu, and many more are hospitalized. Flu vaccine can: 
 keep you from getting flu, 
 make flu less severe if you do get it, and 
 keep you from spreading flu to your family and other people. 2. Inactivated and recombinant flu vaccines A dose of flu vaccine is recommended every flu season. Children 6 months through 6years of age may need two doses during the same flu season. Everyone else needs only one dose each flu season. Some inactivated flu vaccines contain a very small amount of a mercury-based preservative called thimerosal. Studies have not shown thimerosal in vaccines to be harmful, but flu vaccines that do not contain thimerosal are available. There is no live flu virus in flu shots. They cannot cause the flu. There are many flu viruses, and they are always changing. Each year a new flu vaccine is made to protect against three or four viruses that are likely to cause disease in the upcoming flu season. But even when the vaccine doesnt exactly match these viruses, it may still provide some protection Flu vaccine cannot prevent: 
 flu that is caused by a virus not covered by the vaccine, or 
 illnesses that look like flu but are not. It takes about 2 weeks for protection to develop after vaccination, and protection lasts through the flu season. 3. Some people should not get this vaccine Tell the person who is giving you the vaccine:  If you have any severe, life-threatening allergies. If you ever had a life-threatening allergic reaction after a dose of flu vaccine, or have a severe allergy to any part of this vaccine, you may be advised not to get vaccinated. Most, but not all, types of flu vaccine contain a small amount of egg protein.  If you ever had Guillain-Barré Syndrome (also called GBS). Some people with a history of GBS should not get this vaccine. This should be discussed with your doctor.  If you are not feeling well. It is usually okay to get flu vaccine when you have a mild illness, but you might be asked to come back when you feel better. 4. Risks of a vaccine reaction With any medicine, including vaccines, there is a chance of reactions. These are usually mild and go away on their own, but serious reactions are also possible. Most people who get a flu shot do not have any problems with it. Minor problems following a flu shot include:  
 soreness, redness, or swelling where the shot was given  hoarseness  sore, red or itchy eyes  cough  fever  aches  headache  itching  fatigue If these problems occur, they usually begin soon after the shot and last 1 or 2 days. More serious problems following a flu shot can include the following:  There may be a small increased risk of Guillain-Barré Syndrome (GBS) after inactivated flu vaccine. This risk has been estimated at 1 or 2 additional cases per million people vaccinated. This is much lower than the risk of severe complications from flu, which can be prevented by flu vaccine.  Young children who get the flu shot along with pneumococcal vaccine (PCV13) and/or DTaP vaccine at the same time might be slightly more likely to have a seizure caused by fever. Ask your doctor for more information. Tell your doctor if a child who is getting flu vaccine has ever had a seizure. Problems that could happen after any injected vaccine:  People sometimes faint after a medical procedure, including vaccination. Sitting or lying down for about 15 minutes can help prevent fainting, and injuries caused by a fall. Tell your doctor if you feel dizzy, or have vision changes or ringing in the ears.  
 
 Some people get severe pain in the shoulder and have difficulty moving the arm where a shot was given. This happens very rarely.  Any medication can cause a severe allergic reaction. Such reactions from a vaccine are very rare, estimated at about 1 in a million doses, and would happen within a few minutes to a few hours after the vaccination. As with any medicine, there is a very remote chance of a vaccine causing a serious injury or death. The safety of vaccines is always being monitored. For more information, visit: www.cdc.gov/vaccinesafety/ 
 
5. What if there is a serious reaction? What should I look for?  Look for anything that concerns you, such as signs of a severe allergic reaction, very high fever, or unusual behavior. Signs of a severe allergic reaction can include hives, swelling of the face and throat, difficulty breathing, a fast heartbeat, dizziness, and weakness  usually within a few minutes to a few hours after the vaccination. What should I do?  If you think it is a severe allergic reaction or other emergency that cant wait, call 9-1-1 and get the person to the nearest hospital. Otherwise, call your doctor.  Reactions should be reported to the Vaccine Adverse Event Reporting System (VAERS). Your doctor should file this report, or you can do it yourself through  the VAERS web site at www.vaers. [facility].gov, or by calling 3-402.432.6216. VAERS does not give medical advice. 6. The National Vaccine Injury Compensation Program 
 
The [facility] Vaccine Injury Compensation Program (VICP) is a federal program that was created to compensate people who may have been injured by certain vaccines. Persons who believe they may have been injured by a vaccine can learn about the program and about filing a claim by calling 1-468.851.9250 or visiting the [employer] website at www.[facility].gov/vaccinecompensation. There is a time limit to file a claim for compensation. 7. How can I learn more?  Ask your healthcare provider. He or she can give you the vaccine package insert or suggest other sources of information.  Call your local or state health department.  Contact the Centers for Disease Control and Prevention (CDC): 
- Call 6-855.742.4043 (1-800-CDC-INFO) or 
- Visit CDCs website at www.cdc.gov/flu Vaccine Information Statement Inactivated Influenza Vaccine 8/7/2015 
42 USherman Mccoy Ziggy [id number] [facility] [employer] Centers for Disease Control and Prevention Office Use Only Introducing hospitals SERVICES! Dear Daniel Feldman: Thank you for requesting a [facility] account. Our records indicate that you already have an active [employer] account. You can access your account anytime at [email or web address] Did you know that you can access your hospital and ER discharge instructions at any time in [facility]? You can also review all of your test results from your hospital stay or ER visit. Additional Information If you have questions, please visit the Frequently Asked Questions section of the [employer] website at [email or web address] Remember, [facility] is NOT to be used for urgent needs. For medical emergencies, dial 911. Now available from your iPhone and Android! Please provide this summary of care documentation to your next provider. Your primary care clinician is listed as JCARLOS Javier. If you have any questions after today's visit, please call [phone].

## 2017-10-12 NOTE — PROGRESS NOTES
1. Have you been to the ER, urgent care clinic since your last visit? Hospitalized since your last visit? 2. Have you seen or consulted any other health care providers outside of the [address] [place] [address] since your last visit? Include any pap smears or colon screening. Chief Complaint   Patient presents with    Pre-op Exam     Robotic left Partial Nephrectomy on 10/17/2017 with Dr. Luis Diana.

## 2017-10-12 NOTE — PROGRESS NOTES
Tato Sparks is a 74 y.o. female and presents for pre-operative evaluation. Subjective: This patient was seen at the request of Dr. Daniela Sarmiento for pre-operative evaluation for planned procedure: Robotic left partial nephrectomy at [facility] on 10/17/17  General anesthesia planned    Cardiac factors include:  none    METs: tolerate 4 mets of activity without excessive sob/cp/diaphoresis/palpitations or other sx. ROS:  Constitutional: No recent weight change. No weakness/fatigue. No f/c. Skin: No rashes, change in nails/hair, itching   HENT: No HA, dizziness. No hearing loss/tinnitus. No nasal congestion/discharge. Eyes: No change in vision, double/blurred vision or eye pain/redness. Cardiovascular: No CP/palpitations. No VICTOR/orthopnea/PND. Respiratory: No cough/sputum, dyspnea, wheezing. Gastointestinal: No dysphagia, reflux. No n/v. No constipation/diarrhea. No melena/rectal bleeding. Genitourinary: No dysuria, urinary hesitancy, nocturia, hematuria. No incontinence. Musculoskeletal: No joint pain/stiffness. No muscle pain/tenderness. Heme: No h/o anemia.  + easy bleeding/bruising (on Xarelto)   Neurological: No seizures/numbness/weakness. No paresthesias. PMH:  Patient Active Problem List   Diagnosis Code    Sickle cell beta thalassemia (HCC) D57.40    History of sarcoidosis- followed by pulmonary- Dr. Ashok Gonzalez J71.6    Personal history of DVT (deep vein thrombosis) Z86.718    Obesity (BMI 30-39. 9) E66.9    Microcytic anemia D50.9    Hearing loss H91.90    Osteopenia M85.80    History of hysterectomy Z90.710   Right bundle branch block on ECG 12/2016    PSH:  Past Surgical History:   Procedure Laterality Date    HX COLONOSCOPY  2013    HX GYN      Hysterectomy    HX HEMORRHOIDECTOMY      HX HIP REPLACEMENT Bilateral 1996 and 2000    HX KNEE REPLACEMENT Right 03/17/2016    HX ORTHOPAEDIC      HX RETINAL DETACHMENT REPAIR Left 1995        SH:  Social History   Substance Use Topics    Smoking status: Never Smoker    Smokeless tobacco: Never Used    Alcohol use No       FH:  Family History   Problem Relation Age of Onset    Diabetes Mother     Hypertension Mother     Cancer Father     Heart Disease Father     Sickle Cell Anemia Sister     Sickle Cell Anemia Brother     Hypertension Sister        Medications/Allergies:  Current Outpatient Prescriptions on File Prior to Visit   Medication Sig Dispense Refill    ondansetron (ZOFRAN ODT) 4 mg disintegrating tablet Take 1 Tab by mouth every eight (8) hours as needed for Nausea. 30 Tab 0    budesonide-formoterol (SYMBICORT) 80-4.5 mcg/actuation HFAA inhaler Take 2 Puffs by inhalation two (2) times a day. Use for 2 weeks at a time. 1 Inhaler 1    Peak Flow Meter lanette 1 Units by Does Not Apply route daily. 1 Device 0    albuterol (PROVENTIL HFA, VENTOLIN HFA, PROAIR HFA) 90 mcg/actuation inhaler Take 2 Puffs by inhalation every six (6) hours as needed for Wheezing. 1 Inhaler 2    valsartan-hydroCHLOROthiazide (DIOVAN-HCT) 80-12.5 mg per tablet Take 1 Tab by mouth two (2) times a day. 60 Tab 5    tiZANidine (ZANAFLEX) 2 mg tablet Take 1 tablet 3 times a day as needed 30 Tab 0    acetaminophen (TYLENOL EXTRA STRENGTH) 500 mg tablet Take 500 mg by mouth every four (4) hours as needed for Pain.  rivaroxaban (XARELTO) 20 mg tab tablet Take  by mouth daily.  folic acid (FOLVITE) 1 mg tablet Take  by mouth daily.  brimonidine-timolol (COMBIGAN) 0.2-0.5 % drop ophthalmic solution Administer 1 Drop to left eye daily.  ranitidine (ZANTAC) 150 mg tablet Take  by mouth as needed for Indigestion.  Cetirizine (ZYRTEC) 10 mg cap Take 10 mg by mouth as needed.  calcium-cholecalciferol, d3, (CALCIUM 600 + D) 600-125 mg-unit tab Take 600 mg by mouth daily.  docusate sodium (STOOL SOFTENER) 100 mg capsule Take 100 mg by mouth daily.       sodium chloride (AYR SALINE) 0.65 % nasal spray 1 Spray as needed for Congestion.  amLODIPine (NORVASC) 5 mg tablet Take 1 Tab by mouth daily. 30 Tab 5     No current facility-administered medications on file prior to visit. Allergies   Allergen Reactions    Penicillin G Hives       Objective:  Visit Vitals    /77    Pulse 79    Temp 98.9 °F (37.2 °C) (Oral)    Resp 16    Ht 5' 4\" (1.626 m)    Wt 221 lb 6.4 oz (100.4 kg)    SpO2 97%    BMI 38 kg/m2    Body mass index is 38 kg/(m^2). Gen: Well developed, nourished, no distress, alert, obese habitus   HENT: Exterior ears and tympanic membranes normal bilaterally. Supple neck. No thyromegaly or lymphadenopathy. Oropharynx clear and moist mucous membranes. Eyes: Conjunctiva normal. PERRL. CV: S1, S2.  RRR. No murmurs/rubs. No thrills palpated. No carotid bruits. Intact distal pulses. No edema. Pulm: No abnormalities on inspection. Clear to auscultation bilaterally. No wheezing/rhonchi. Normal effort. GI: Soft, nontender, nondistended. Normal active bowel sounds. No  masses on palpation. No hepatosplenomegaly. MS: Gait normal.  Joints without deformity/tenderness. Strength intact bilateral upper and lower ext. Normal ROM all extremities. Neuro: A/O x 3.  CN 2-12 intact. 2+DTR. No focal motor or sensory deficits. Speech normal.   Skin: No lesions/rashes on inspection. Psych: Appropriate affect, judgement and insight. Short-term memory intact. Labwork and Ancillary Studies:    CBC w/Diff  Lab Results   Component Value Date/Time    WBC 9.3 09/07/2017 02:30 PM    HCT 32.0 (L) 09/07/2017 02:30 PM    MCV 70.2 (L) 09/07/2017 02:30 PM        Basic Metabolic Profile  Lab Results   Component Value Date     09/07/2017    CO2 27 09/07/2017    BUN 26 (H) 09/07/2017     ECG 12/5/16- RBBB, sinus tachycardia    Assessment/Plan:    The patient is moderate risk for planned procedure and may proceed to OR without further testing.  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  However given the necessity and relative urgency of this procedure benefits are felt to outweigh risks of stopping anticoagulation for the procedure. She should receive DVT prophylaxis in the form of a sequential compression device while off of the anticoagulation. The anticoagulation should be resumed as soon as deemed relatively safe from a surgical standpoint. The patient has a risk for pulmonary failure from chronic sarcoidosis. Aggressive pulmonary toilet post-operatively is recommended. The following recommendations were made for medication regimen prior to surgery:  Stop Xarelto for a total of 3 doses prior to the surgery with the last held dose being the day of surgery. Continue taking HTN meds prior to surgery. Diagnoses and all orders for this visit:    1. Preoperative clearance    2. Right bundle branch block (RBBB) on electrocardiogram (ECG)    3. Sickle cell beta thalassemia (HCC)    4. History of sarcoidosis    5. Personal history of DVT (deep vein thrombosis)    6. Obesity (BMI 30-39.9)    7.  Encounter for immunization  -     Influenza virus vaccine ([place]) 72 years and older ([id number])

## 2017-11-06 ENCOUNTER — OFFICE VISIT (OUTPATIENT)
Dept: FAMILY MEDICINE CLINIC | Age: 67
End: 2017-11-06

## 2017-11-06 VITALS
WEIGHT: 217.2 LBS | BODY MASS INDEX: 37.08 KG/M2 | RESPIRATION RATE: 16 BRPM | SYSTOLIC BLOOD PRESSURE: 143 MMHG | HEIGHT: 64 IN | TEMPERATURE: 98.1 F | DIASTOLIC BLOOD PRESSURE: 86 MMHG | HEART RATE: 78 BPM

## 2017-11-06 DIAGNOSIS — R30.9 URINARY PAIN: Primary | ICD-10-CM

## 2017-11-06 DIAGNOSIS — R35.0 URINARY FREQUENCY: ICD-10-CM

## 2017-11-06 LAB
BILIRUB UR QL STRIP: NEGATIVE
GLUCOSE UR-MCNC: NEGATIVE MG/DL
KETONES P FAST UR STRIP-MCNC: NEGATIVE MG/DL
PH UR STRIP: 6 [PH] (ref 4.6–8)
PROT UR QL STRIP: NEGATIVE
SP GR UR STRIP: 1 (ref 1–1.03)
UA UROBILINOGEN AMB POC: NORMAL (ref 0.2–1)
URINALYSIS CLARITY POC: CLEAR
URINALYSIS COLOR POC: YELLOW
URINE BLOOD POC: NEGATIVE
URINE LEUKOCYTES POC: NORMAL
URINE NITRITES POC: NEGATIVE

## 2017-11-06 RX ORDER — PHENAZOPYRIDINE HYDROCHLORIDE 100 MG/1
100 TABLET, FILM COATED ORAL
Qty: 9 TAB | Refills: 0 | Status: SHIPPED | OUTPATIENT
Start: 2017-11-06 | End: 2017-11-09

## 2017-11-06 RX ORDER — OMEPRAZOLE 20 MG/1
20 CAPSULE, DELAYED RELEASE ORAL DAILY
COMMUNITY
End: 2021-09-23 | Stop reason: ALTCHOICE

## 2017-11-06 RX ORDER — CIPROFLOXACIN 500 MG/1
500 TABLET ORAL 2 TIMES DAILY
Qty: 10 TAB | Refills: 0 | Status: SHIPPED | OUTPATIENT
Start: 2017-11-06 | End: 2017-11-11

## 2017-11-06 NOTE — PATIENT INSTRUCTIONS

## 2017-11-06 NOTE — MR AVS SNAPSHOT
Visit Information Date & Time Provider Department Dept. Phone Encounter #  
 11/6/2017  2:00 PM Elba Duncan, [address] [facility] [phone] [id number] Follow-up Instructions Return if symptoms worsen or fail to improve. Your Appointments 11/21/2017  1:30 PM  
Office Visit with Elba Duncan MD  
[address] Appt Note: mwv [id number]; pt r/s from 11/09/17  
 [facility] [place]  
  
   
 [address] Upcoming Health Maintenance Date Due DTaP/Tdap/Td series (1 - Tdap) 7/3/1973 Pneumococcal 65+ High/Highest Risk (1 of 2 - PCV13) 7/3/2017 MEDICARE YEARLY EXAM 11/9/2017 BREAST CANCER SCRN MAMMOGRAM 10/13/2018 GLAUCOMA SCREENING Q2Y 6/13/2019 COLONOSCOPY 11/4/2024 Allergies as of 11/6/2017  Review Complete On: 11/6/2017 By: Elba Duncan MD  
  
 Severity Noted Reaction Type Reactions Penicillin G  09/08/2016    Hives Current Immunizations  Reviewed on 3/30/2017 Name Date Influenza High Dose Vaccine PF 10/12/2017  1:44 PM  
 Influenza Vaccine (Quad) PF 9/29/2016 Pneumococcal Polysaccharide (PPSV-23) 11/17/2015 Zoster Vaccine, Live 6/11/2013 Not reviewed this visit You Were Diagnosed With   
  
 Codes Comments Urinary pain    -  Primary ICD-10-CM: R30.9 ICD-9-CM: 788.1 Urinary frequency     ICD-10-CM: R35.0 ICD-9-CM: 788.41 Vitals BP Pulse Temp Resp Height(growth percentile) Weight(growth percentile) 143/86 78 98.1 °F (36.7 °C) (Oral) 16 5' 4\" (1.626 m) 217 lb 3.2 oz (98.5 kg) BMI OB Status Smoking Status 37.28 kg/m2 Hysterectomy Never Smoker Vitals History BMI and BSA Data Body Mass Index Body Surface Area  
 37.28 kg/m 2 2.11 m 2 Preferred Pharmacy Pharmacy Name Phone [facility] [address] [place] [address] Drive [phone] Your Updated Medication List  
  
   
This list is accurate as of: 11/6/17  2:19 PM.  Always use your most recent med list.  
  
  
  
  
 albuterol 90 mcg/actuation inhaler Commonly known as:  PROVENTIL HFA, VENTOLIN HFA, PROAIR HFA Take 2 Puffs by inhalation every six (6) hours as needed for Wheezing. AYR SALINE 0.65 % nasal spray Generic drug:  sodium chloride 1 Spray as needed for Congestion. budesonide-formoterol 80-4.5 mcg/actuation Hfaa Commonly known as:  SYMBICORT Take 2 Puffs by inhalation two (2) times a day. Use for 2 weeks at a time. CALCIUM 600 + D 600-125 mg-unit Tab Generic drug:  calcium-cholecalciferol (d3) Take 600 mg by mouth daily. ciprofloxacin HCl 500 mg tablet Commonly known as:  CIPRO Take 1 Tab by mouth two (2) times a day for 5 days. COMBIGAN 0.2-0.5 % Drop ophthalmic solution Generic drug:  brimonidine-timolol Administer 1 Drop to left eye daily. folic acid 1 mg tablet Commonly known as:  Google Take  by mouth daily. GAVISCON  mg/15 mL suspension Generic drug:  aluminum hydrox-magnesium carb Take 15 mL by mouth three (3) times daily. Peak Flow Meter Bridgette  
1 Units by Does Not Apply route daily. phenazopyridine 100 mg tablet Commonly known as:  PYRIDIUM Take 1 Tab by mouth three (3) times daily (after meals) for 3 days. PriLOSEC 20 mg capsule Generic drug:  omeprazole Take 20 mg by mouth daily. STOOL SOFTENER 100 mg capsule Generic drug:  docusate sodium Take 100 mg by mouth daily. tiZANidine 2 mg tablet Commonly known as:  Pauline Reagin Take 1 tablet 3 times a day as needed TYLENOL EXTRA STRENGTH 500 mg tablet Generic drug:  acetaminophen Take 500 mg by mouth every four (4) hours as needed for Pain.  
  
 valsartan-hydroCHLOROthiazide 80-12.5 mg per tablet Commonly known as:  DIOVAN-HCT Take 1 Tab by mouth two (2) times a day. XARELTO 20 mg Tab tablet Generic drug:  rivaroxaban Take  by mouth daily. ZANTAC 150 mg tablet Generic drug:  raNITIdine Take  by mouth as needed for Indigestion. ZyrTEC 10 mg Cap Generic drug:  Cetirizine Take 10 mg by mouth as needed. Prescriptions Sent to Pharmacy Refills  
 ciprofloxacin HCl (CIPRO) 500 mg tablet 0 Sig: Take 1 Tab by mouth two (2) times a day for 5 days. Class: Normal  
 Pharmacy: [id number] [place] [address] Ph #: [phone] Route: Oral  
 phenazopyridine (PYRIDIUM) 100 mg tablet 0 Sig: Take 1 Tab by mouth three (3) times daily (after meals) for 3 days. Class: Normal  
 Pharmacy: [id number] [place] [address] Ph #: [phone] Route: Oral  
  
We Performed the Following AMB POC URINALYSIS DIP STICK AUTO W/O MICRO [59258 CPT(R)] Follow-up Instructions Return if symptoms worsen or fail to improve. Patient Instructions Urinary Tract Infection in Women: Care Instructions Your Care Instructions A urinary tract infection, or UTI, is a general term for an infection anywhere between the kidneys and the urethra (where urine comes out). Most UTIs are bladder infections. They often cause pain or burning when you urinate. UTIs are caused by bacteria and can be cured with antibiotics. Be sure to complete your treatment so that the infection goes away. Follow-up care is a key part of your treatment and safety. Be sure to make and go to all appointments, and call your doctor if you are having problems. It's also a good idea to know your test results and keep a list of the medicines you take. How can you care for yourself at home? · Take your antibiotics as directed.  Do not stop taking them just because you feel better. You need to take the full course of antibiotics. · Drink extra water and other fluids for the next day or two. This may help wash out the bacteria that are causing the infection. (If you have kidney, heart, or liver disease and have to limit fluids, talk with your doctor before you increase your fluid intake.) · Avoid drinks that are carbonated or have caffeine. They can irritate the bladder. · Urinate often. Try to empty your bladder each time. · To relieve pain, take a hot bath or lay a heating pad set on low over your lower belly or genital area. Never go to sleep with a heating pad in place. To prevent UTIs · Drink plenty of water each day. This helps you urinate often, which clears bacteria from your system. (If you have kidney, heart, or liver disease and have to limit fluids, talk with your doctor before you increase your fluid intake.) · Urinate when you need to. · Urinate right after you have sex. · Change sanitary pads often. · Avoid douches, bubble baths, feminine hygiene sprays, and other feminine hygiene products that have deodorants. · After going to the bathroom, wipe from front to back. When should you call for help? Call your doctor now or seek immediate medical care if: 
? · Symptoms such as fever, chills, nausea, or vomiting get worse or appear for the first time. ? · You have new pain in your back just below your rib cage. This is called flank pain. ? · There is new blood or pus in your urine. ? · You have any problems with your antibiotic medicine. ? Watch closely for changes in your health, and be sure to contact your doctor if: 
? · You are not getting better after taking an antibiotic for 2 days. ? · Your symptoms go away but then come back. Where can you learn more? Go to http://diego-beatriz.info/. Enter O101 in the search box to learn more about \"Urinary Tract Infection in Women: Care Instructions. \" Current as of: May 12, 2017 Content Version: 11.4 © 2889-1349 Healthwise, [employer]. Care instructions adapted under license by [employer] (which disclaims liability or warranty for this information). If you have questions about a medical condition or this instruction, always ask your healthcare professional. [address] any warranty or liability for your use of this information. Introducing [facility]! Dear Naomi Landeros: Thank you for requesting a Clar[employer] account. Our records indicate that you already have an active Clar[employer] account. You can access your account anytime at [email or web address] Did you know that you can access your hospital and ER discharge instructions at any time in Clariture? You can also review all of your test results from your hospital stay or ER visit. Additional Information If you have questions, please visit the Frequently Asked Questions section of the Clar[employer] website at [email or web address] Remember, Clariture is NOT to be used for urgent needs. For medical emergencies, dial 911. Now available from your iPhone and Android! Please provide this summary of care documentation to your next provider. Your primary care clinician is listed as JCARLOS Javier. If you have any questions after today's visit, please call [phone].

## 2017-11-06 NOTE — PROGRESS NOTES
1. Have you been to the ER, urgent care clinic since your last visit? Hospitalized since your last visit? No.     2. Have you seen or consulted any other health care providers outside of the [address] [place] [address] since your last visit? Include any pap smears or colon screening.   No.    Chief Complaint   Patient presents with    Urinary Pain    Urinary Frequency

## 2017-11-06 NOTE — PROGRESS NOTES
Jasson Ordonez is a 74 y.o. female and presents with Urinary Pain and Urinary Frequency       Subjective:    Dysuria and frequency- for several days. No f/c. No n/v. No abd pain. No back pain. Assessment/Plan:    uti- 1+ LE- discussed with pt. cipro sent in given upcoming procedure. Pt will call if not improving    RTC as needed  Orders Placed This Encounter    AMB POC URINALYSIS DIP STICK AUTO W/O MICRO    omeprazole (PRILOSEC) 20 mg capsule     Sig: Take 20 mg by mouth daily.  ciprofloxacin HCl (CIPRO) 500 mg tablet     Sig: Take 1 Tab by mouth two (2) times a day for 5 days. Dispense:  10 Tab     Refill:  0    phenazopyridine (PYRIDIUM) 100 mg tablet     Sig: Take 1 Tab by mouth three (3) times daily (after meals) for 3 days. Dispense:  9 Tab     Refill:  0       Diagnoses and all orders for this visit:    1. Urinary pain  -     AMB POC URINALYSIS DIP STICK AUTO W/O MICRO    2. Urinary frequency  -     AMB POC URINALYSIS DIP STICK AUTO W/O MICRO    Other orders  -     ciprofloxacin HCl (CIPRO) 500 mg tablet; Take 1 Tab by mouth two (2) times a day for 5 days. -     phenazopyridine (PYRIDIUM) 100 mg tablet; Take 1 Tab by mouth three (3) times daily (after meals) for 3 days. ROS:  Negative except as mentioned above  Cardiac- no chest pain or palpitations  Pulmonary- no sob or wheezes  GI- no n/v or diarrhea. SH:  Social History   Substance Use Topics    Smoking status: Never Smoker    Smokeless tobacco: Never Used    Alcohol use No         Medications/Allergies:  Current Outpatient Prescriptions on File Prior to Visit   Medication Sig Dispense Refill    aluminum hydrox-magnesium carb (GAVISCON)  mg/15 mL suspension Take 15 mL by mouth three (3) times daily.  budesonide-formoterol (SYMBICORT) 80-4.5 mcg/actuation HFAA inhaler Take 2 Puffs by inhalation two (2) times a day. Use for 2 weeks at a time.  1 Inhaler 1    Peak Flow Meter lanette 1 Units by Does Not Apply route daily. 1 Device 0    albuterol (PROVENTIL HFA, VENTOLIN HFA, PROAIR HFA) 90 mcg/actuation inhaler Take 2 Puffs by inhalation every six (6) hours as needed for Wheezing. 1 Inhaler 2    valsartan-hydroCHLOROthiazide (DIOVAN-HCT) 80-12.5 mg per tablet Take 1 Tab by mouth two (2) times a day. 60 Tab 5    tiZANidine (ZANAFLEX) 2 mg tablet Take 1 tablet 3 times a day as needed 30 Tab 0    acetaminophen (TYLENOL EXTRA STRENGTH) 500 mg tablet Take 500 mg by mouth every four (4) hours as needed for Pain.  rivaroxaban (XARELTO) 20 mg tab tablet Take  by mouth daily.  folic acid (FOLVITE) 1 mg tablet Take  by mouth daily.  brimonidine-timolol (COMBIGAN) 0.2-0.5 % drop ophthalmic solution Administer 1 Drop to left eye daily.  ranitidine (ZANTAC) 150 mg tablet Take  by mouth as needed for Indigestion.  Cetirizine (ZYRTEC) 10 mg cap Take 10 mg by mouth as needed.  calcium-cholecalciferol, d3, (CALCIUM 600 + D) 600-125 mg-unit tab Take 600 mg by mouth daily.  docusate sodium (STOOL SOFTENER) 100 mg capsule Take 100 mg by mouth daily.  sodium chloride (AYR SALINE) 0.65 % nasal spray 1 Spray as needed for Congestion. No current facility-administered medications on file prior to visit. Allergies   Allergen Reactions    Penicillin G Hives       Objective:  Visit Vitals    BP (!) 158/93    Pulse 90    Temp 98.1 °F (36.7 °C) (Oral)    Resp 16    Ht 5' 4\" (1.626 m)    Wt 217 lb 3.2 oz (98.5 kg)    BMI 37.28 kg/m2    Body mass index is 37.28 kg/(m^2). Constitutional: Well developed, nourished, no distress, alert   CV: S1, S2.  RRR. No murmurs/rubs. No thrills palpated. No carotid bruits. Intact distal pulses. No edema. Pulm: No abnormalities on inspection. Clear to auscultation bilaterally. No wheezing/rhonchi. Normal effort. GI: Soft, nontender, nondistended. Normal active bowel sounds. No  masses on palpation. No hepatosplenomegaly.

## 2017-11-10 ENCOUNTER — TELEPHONE (OUTPATIENT)
Dept: FAMILY MEDICINE CLINIC | Age: 67
End: 2017-11-10

## 2017-11-10 NOTE — TELEPHONE ENCOUNTER
Patient called and she stated that she is still having some burning and pressure while urinating, She still have 2 days left to take her Cipro. So I told her to finished her antibiotics first and if its not better she needs to call us and schedule an appointment. Unless you want to send another antibiotics for her. Please advice.  thanks

## 2017-11-10 NOTE — TELEPHONE ENCOUNTER
Patient is aware and she will call us Monday to let us know how she is doing. If not better we need to collect her urine to send out for culture.

## 2017-11-21 ENCOUNTER — OFFICE VISIT (OUTPATIENT)
Dept: FAMILY MEDICINE CLINIC | Age: 67
End: 2017-11-21

## 2017-11-21 VITALS
WEIGHT: 218 LBS | TEMPERATURE: 97.9 F | BODY MASS INDEX: 37.22 KG/M2 | HEIGHT: 64 IN | DIASTOLIC BLOOD PRESSURE: 77 MMHG | SYSTOLIC BLOOD PRESSURE: 166 MMHG | RESPIRATION RATE: 16 BRPM | HEART RATE: 94 BPM

## 2017-11-21 DIAGNOSIS — Z86.2 HISTORY OF SARCOIDOSIS: ICD-10-CM

## 2017-11-21 DIAGNOSIS — Z23 ENCOUNTER FOR IMMUNIZATION: Primary | ICD-10-CM

## 2017-11-21 DIAGNOSIS — Z13.31 SCREENING FOR DEPRESSION: ICD-10-CM

## 2017-11-21 DIAGNOSIS — Z00.00 MEDICARE ANNUAL WELLNESS VISIT, SUBSEQUENT: ICD-10-CM

## 2017-11-21 DIAGNOSIS — Z13.39 SCREENING FOR ALCOHOLISM: ICD-10-CM

## 2017-11-21 NOTE — PROGRESS NOTES
This is a Subsequent Medicare Annual Wellness Exam (AWV) (Performed 12 months after IPPE or effective date of Medicare Part B enrollment)    I have reviewed the patient's medical history in detail and updated the computerized patient record. History     Past Medical History:   Diagnosis Date    Asthma     Hypertension     Migraine     Sarcoidosis     Sickle cell anemia ([facility] [address] 1985      Past Surgical History:   Procedure Laterality Date    HX COLONOSCOPY  2013    HX GYN      Hysterectomy    HX HEMORRHOIDECTOMY      HX HIP REPLACEMENT Bilateral 1996 and 2000    HX KNEE REPLACEMENT Right 03/17/2016    HX ORTHOPAEDIC      HX RETINAL DETACHMENT REPAIR Left 1995     Current Outpatient Prescriptions   Medication Sig Dispense Refill    omeprazole (PRILOSEC) 20 mg capsule Take 20 mg by mouth daily.  aluminum hydrox-magnesium carb (GAVISCON)  mg/15 mL suspension Take 15 mL by mouth three (3) times daily.  budesonide-formoterol (SYMBICORT) 80-4.5 mcg/actuation HFAA inhaler Take 2 Puffs by inhalation two (2) times a day. Use for 2 weeks at a time. 1 Inhaler 1    Peak Flow Meter lanette 1 Units by Does Not Apply route daily. 1 Device 0    albuterol (PROVENTIL HFA, VENTOLIN HFA, PROAIR HFA) 90 mcg/actuation inhaler Take 2 Puffs by inhalation every six (6) hours as needed for Wheezing. 1 Inhaler 2    valsartan-hydroCHLOROthiazide (DIOVAN-HCT) 80-12.5 mg per tablet Take 1 Tab by mouth two (2) times a day. 60 Tab 5    tiZANidine (ZANAFLEX) 2 mg tablet Take 1 tablet 3 times a day as needed 30 Tab 0    acetaminophen (TYLENOL EXTRA STRENGTH) 500 mg tablet Take 500 mg by mouth every four (4) hours as needed for Pain.  rivaroxaban (XARELTO) 20 mg tab tablet Take  by mouth daily.  folic acid (FOLVITE) 1 mg tablet Take  by mouth daily.  brimonidine-timolol (COMBIGAN) 0.2-0.5 % drop ophthalmic solution Administer 1 Drop to left eye daily.       ranitidine (ZANTAC) 150 mg tablet Take  by mouth as needed for Indigestion.  Cetirizine (ZYRTEC) 10 mg cap Take 10 mg by mouth as needed.  calcium-cholecalciferol, d3, (CALCIUM 600 + D) 600-125 mg-unit tab Take 600 mg by mouth daily.  docusate sodium (STOOL SOFTENER) 100 mg capsule Take 100 mg by mouth daily.  sodium chloride (AYR SALINE) 0.65 % nasal spray 1 Spray as needed for Congestion. 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 9) E66.9    Microcytic anemia D50.9    Hearing loss H91.90    Osteopenia M85.80    History of hysterectomy Z90.710    Right bundle branch block (RBBB) on electrocardiogram (ECG) I45.10       Depression Risk Factor Screening:     PHQ over the last two weeks 11/21/2017   Little interest or pleasure in doing things Not at all   Feeling down, depressed or hopeless Not at all   Total Score PHQ 2 0     Alcohol Risk Factor Screening: You do not drink alcohol or very rarely. Functional Ability and Level of Safety:   Hearing Loss  The patient wears hearing aids. Activities of Daily Living  The home contains: no safety equipment. Patient does total self care    Fall RiskFall Risk Assessment, last 12 mths 11/21/2017   Able to walk? Yes   Fall in past 12 months?  No       Abuse Screen  Patient is not abused    Cognitive Screening   Evaluation of Cognitive Function:  Has your family/caregiver stated any concerns about your memory: no  Normal    Patient Care Team   Patient Care Team:  Isaias Naik MD as PCP - General (Internal Medicine)  Shahram Farris RN as Ambulatory Care Navigator  Gume Jacobs MD (Ophthalmology)  Jody Yang MD (Orthopedic Surgery)    Assessment/Plan   Education and counseling provided:  Are appropriate based on today's review and evaluation  End-of-Life planning (with patient's consent)  Pneumococcal Vaccine    Diagnoses and all orders for this visit:    1. Medicare annual wellness visit, subsequent    2. Screening for alcoholism  -     Annual  Alcohol Screen 15 min ()    3.  Screening for depression  -     Depression Screen Annual      Health Maintenance Due   Topic Date Due    DTaP/Tdap/Td series (1 - Tdap) 07/03/1973

## 2017-11-21 NOTE — MR AVS SNAPSHOT
Visit Information Date & Time Provider Department Dept. Phone Encounter #  
 11/21/2017  1:30 PM Christina Marsh, [address] [facility] [phone] [id number] Follow-up Instructions Return in about 3 months (around 2/21/2018) for 30 minute slot for follow up with labs prior. Upcoming Health Maintenance Date Due DTaP/Tdap/Td series (1 - Tdap) 7/3/1973 Pneumococcal 65+ High/Highest Risk (1 of 2 - PCV13) 7/3/2017 MEDICARE YEARLY EXAM 11/9/2017 BREAST CANCER SCRN MAMMOGRAM 10/13/2018 GLAUCOMA SCREENING Q2Y 6/13/2019 COLONOSCOPY 11/2/2027 Allergies as of 11/21/2017  Review Complete On: 11/21/2017 By: Crhistina Marsh MD  
  
 Severity Noted Reaction Type Reactions Penicillin G  09/08/2016    Hives Current Immunizations  Reviewed on 11/21/2017 Name Date Influenza High Dose Vaccine PF 10/12/2017  1:44 PM  
 Influenza Vaccine (Quad) PF 9/29/2016 Pneumococcal Conjugate (PCV-13)  Incomplete Pneumococcal Polysaccharide (PPSV-23) 11/17/2015 Zoster Vaccine, Live 6/11/2013 Reviewed by Christina Marsh MD on 11/21/2017 at  1:46 PM  
You Were Diagnosed With   
  
 Codes Comments Encounter for immunization    -  Primary ICD-10-CM: D84 ICD-9-CM: V03.89 Medicare annual wellness visit, subsequent     ICD-10-CM: Z00.00 ICD-9-CM: V70.0 Screening for alcoholism     ICD-10-CM: Z13.89 ICD-9-CM: V79.1 Screening for depression     ICD-10-CM: Z13.89 ICD-9-CM: V79.0 History of sarcoidosis     ICD-10-CM: Z86.2 ICD-9-CM: V12.29 Vitals BP Pulse Temp Resp Height(growth percentile) Weight(growth percentile) 166/77 94 97.9 °F (36.6 °C) (Oral) 16 5' 4\" (1.626 m) 218 lb (98.9 kg) BMI OB Status Smoking Status 37.42 kg/m2 Hysterectomy Never Smoker BMI and BSA Data Body Mass Index Body Surface Area  
 37.42 kg/m 2 2.11 m 2 Preferred Pharmacy Pharmacy Name [facility] [address] [place] [address] [phone] Your Updated Medication List  
  
   
This list is accurate as of: 11/21/17  2:06 PM.  Always use your most recent med list.  
  
  
  
  
 albuterol 90 mcg/actuation inhaler Commonly known as:  PROVENTIL HFA, VENTOLIN HFA, PROAIR HFA Take 2 Puffs by inhalation every six (6) hours as needed for Wheezing. AYR SALINE 0.65 % nasal spray Generic drug:  sodium chloride 1 Spray as needed for Congestion. budesonide-formoterol 80-4.5 mcg/actuation Hfaa Commonly known as:  SYMBICORT Take 2 Puffs by inhalation two (2) times a day. Use for 2 weeks at a time. CALCIUM 600 + D 600-125 mg-unit Tab Generic drug:  calcium-cholecalciferol (d3) Take 600 mg by mouth daily. COMBIGAN 0.2-0.5 % Drop ophthalmic solution Generic drug:  brimonidine-timolol Administer 1 Drop to left eye daily. folic acid 1 mg tablet Commonly known as:  Google Take  by mouth daily. GAVISCON  mg/15 mL suspension Generic drug:  aluminum hydrox-magnesium carb Take 15 mL by mouth three (3) times daily. Peak Flow Meter Bridgette  
1 Units by Does Not Apply route daily. PriLOSEC 20 mg capsule Generic drug:  omeprazole Take 20 mg by mouth daily. STOOL SOFTENER 100 mg capsule Generic drug:  docusate sodium Take 100 mg by mouth daily. tiZANidine 2 mg tablet Commonly known as:  Carol Michael Take 1 tablet 3 times a day as needed TYLENOL EXTRA STRENGTH 500 mg tablet Generic drug:  acetaminophen Take 500 mg by mouth every four (4) hours as needed for Pain.  
  
 valsartan-hydroCHLOROthiazide 80-12.5 mg per tablet Commonly known as:  DIOVAN-HCT Take 1 Tab by mouth two (2) times a day. XARELTO 20 mg Tab tablet Generic drug:  rivaroxaban Take  by mouth daily. ZANTAC 150 mg tablet Generic drug:  raNITIdine Take  by mouth as needed for Indigestion. ZyrTEC 10 mg Cap Generic drug:  Cetirizine Take 10 mg by mouth as needed. We Performed the Following [address] [[id number] [facility]] PNEUMOCOCCAL CONJ VACCINE 13 VALENT IM [id number] CPT(R)] AK ANNUAL ALCOHOL SCREEN 15 MIN [id number] [facility]] Follow-up Instructions Return in about 3 months (around 2/21/2018) for 30 minute slot for follow up with labs prior. To-Do List   
 11/21/2017 Lab:  CBC W/O DIFF   
  
 11/21/2017 Lab:  METABOLIC PANEL, BASIC Patient Instructions Medicare Wellness Visit, Female The best way to live healthy is to have a healthy lifestyle by eating a well-balanced diet, exercising regularly, limiting alcohol and stopping smoking. Regular physical exams and screening tests are another way to keep healthy. Preventive exams provided by your health care provider can find health problems before they become diseases or illnesses. Preventive services including immunizations, screening tests, monitoring and exams can help you take care of your own health. All people over age 72 should have a pneumovax  and and a prevnar shot to prevent pneumonia. These are once in a lifetime unless you and your provider decide differently. All people over 65 should have a yearly flu shot and a tetanus vaccine every 10 years. A bone mass density to screen for osteoporosis or thinning of the bones should be done every 2 years after 65. Screening for diabetes mellitus with a blood sugar test should be done every year. Glaucoma is a disease of the eye due to increased ocular pressure that can lead to blindness and it should be done every year by an eye professional. 
 
Cardiovascular screening tests that check for elevated lipids (fatty part of blood) which can lead to heart disease and strokes should be done every 5 years.  
 
Colorectal screening that evaluates for blood or polyps in your colon should be done yearly as a stool test or every five years as a flexible sigmoidoscope or every 10 years as a colonoscopy up to age 76. Breast cancer screening with a mammogram is recommended biennially  for women age 54-69. Screening for cervical cancer with a pap smear and pelvic exam is recommended for women after age 72 years every 2 years up to age 79 or when the provider and patient decide to stop. If there is a history of cervical abnormalities or other increased risk for cancer then the test is recommended yearly. Hepatitis C screening is also recommended for anyone born between 80 through [address]. A shingles vaccine is also recommended once in a lifetime after age 61. Your Medicare Wellness Exam is recommended annually. Here is a list of your current Health Maintenance items with a due date: 
Health Maintenance Due Topic Date Due  
 DTaP/Tdap/Td  (1 - Tdap) 07/03/1973  Pneumococcal Vaccine (1 of 2 - PCV13) 07/03/2017 [facility] Annual Well Visit  11/09/2017 Vaccine Information Statement Pneumococcal Conjugate Vaccine (PCV13): What You Need to Know Many Vaccine Information Statements are available in [place] and other languages. See www.immunize.org/vis. Hojas de información Sobre Vacunas están disponibles en español y en muchos otros idiomas. Visite www.immunize.org/vis. 1. Why get vaccinated? Vaccination can protect both children and adults from pneumococcal disease. Pneumococcal disease is caused by bacteria that can spread from person to person through close contact. It can cause ear infections, and it can also lead to more serious infections of the: 
 Lungs (pneumonia),  Blood (bacteremia), and 
 Covering of the brain and spinal cord (meningitis). Pneumococcal pneumonia is most common among adults. Pneumococcal meningitis can cause deafness and brain damage, and it kills about 1 child in 10 who get it. Anyone can get pneumococcal disease, but children under 3years of age and adults 72 years and older, people with certain medical conditions, and cigarette smokers are at the highest risk. Before there was a vaccine, the [facility] saw: 
 more than 700 cases of meningitis, 
 about 13,000 blood infections, 
 about 5 million ear infections, and 
 about 200 deaths 
in children under 5 each year from pneumococcal disease. Since vaccine became available, severe pneumococcal disease in these children has fallen by 88%. About 18,000 older adults die of pneumococcal disease each year in the [place]. Treatment of pneumococcal infections with penicillin and other drugs is not as effective as it used to be, because some strains of the disease have become resistant to these drugs. This makes prevention of the disease, through vaccination, even more important. 2. PCV13 vaccine Pneumococcal conjugate vaccine (called PCV13) protects against 13 types of pneumococcal bacteria. PCV13 is routinely given to children at 2, 4, 6, and 1515 months of age. It is also recommended for children and adults 3to 59years of age with certain health conditions, and for all adults 72years of age and older. Your doctor can give you details. 3. Some people should not get this vaccine Anyone who has ever had a life-threatening allergic reaction to a dose of this vaccine, to an earlier pneumococcal vaccine called PCV7, or to any vaccine containing diphtheria toxoid (for example, DTaP), should not get PCV13. Anyone with a severe allergy to any component of PCV13 should not get the vaccine. Tell your doctor if the person being vaccinated has any severe allergies. If the person scheduled for vaccination is not feeling well, your healthcare provider might decide to reschedule the shot on another day. 4. Risks of a vaccine reaction With any medicine, including vaccines, there is a chance of reactions. These are usually mild and go away on their own, but serious reactions are also possible. Problems reported following PCV13 varied by age and dose in the series. The most common problems reported among children were:  About half became drowsy after the shot, had a temporary loss of appetite, or had redness or tenderness where the shot was given.  About 1 out of 3 had swelling where the shot was given.  About 1 out of 3 had a mild fever, and about 1 in 20 had a fever over 102.2°F. 
 Up to about 8 out of 10 became fussy or irritable. Adults have reported pain, redness, and swelling where the shot was given; also mild fever, fatigue, headache, chills, or muscle pain. [employer] children who get PCV13 along with inactivated flu vaccine at the same time may be at increased risk for seizures caused by fever. Ask your doctor for more information. Problems that could happen after any vaccine:  People sometimes faint after a medical procedure, including vaccination. Sitting or lying down for about 15 minutes can help prevent fainting, and injuries caused by a fall. Tell your doctor if you feel dizzy, or have vision changes or ringing in the ears.  Some older children and adults get severe pain in the shoulder and have difficulty moving the arm where a shot was given. This happens very rarely.  Any medication can cause a severe allergic reaction. Such reactions from a vaccine are very rare, estimated at about 1 in a million doses, and would happen within a few minutes to a few hours after the vaccination. As with any medicine, there is a very small chance of a vaccine causing a serious injury or death. The safety of vaccines is always being monitored. For more information, visit: www.cdc.gov/vaccinesafety/  
 
5. What if there is a serious reaction? What should I look for?  
 
 Look for anything that concerns you, such as signs of a severe allergic reaction, very high fever, or unusual behavior. Signs of a severe allergic reaction can include hives, swelling of the face and throat, difficulty breathing, a fast heartbeat, dizziness, and weakness  usually within a few minutes to a few hours after the vaccination. What should I do?  If you think it is a severe allergic reaction or other emergency that cant wait, call 9-1-1 or get the person to the nearest hospital. Otherwise, call your doctor. Reactions should be reported to the Vaccine Adverse Event Reporting System (VAERS). Your doctor should file this report, or you can do it yourself through the VAERS web site at www.vaers. [facility].gov, or by calling 3-727.177.4286. VAERS does not give medical advice. 6. The National Vaccine Injury Compensation Program 
 
The [facility] Vaccine Injury Compensation Program (VICP) is a federal program that was created to compensate people who may have been injured by certain vaccines. Persons who believe they may have been injured by a vaccine can learn about the program and about filing a claim by calling 4-376.131.8024 or visiting the [address] [place], [place] [id number] website at www.[facility].gov/vaccinecompensation. There is a time limit to file a claim for compensation. 7. How can I learn more?  Ask your healthcare provider. He or she can give you the vaccine package insert or suggest other sources of information.  Call your local or state health department.  Contact the Centers for Disease Control and Prevention (CDC): 
- Call 7-968.331.3300 (1-800-CDC-INFO) or 
- Visit CDCs website at www.cdc.gov/vaccines Vaccine Information Statement PCV13 Vaccine 11/5/2015  
[address] [id number] Department of [employer] and [employer] Centers for Disease Control and Prevention Office Use Only Well Visit, Over 72: Care Instructions Your Care Instructions Physical exams can help you stay healthy.  Your doctor has checked your overall health and may have suggested ways to take good care of yourself. He or she also may have recommended tests. At home, you can help prevent illness with healthy eating, regular exercise, and other steps. Follow-up care is a key part of your treatment and safety. Be sure to make and go to all appointments, and call your doctor if you are having problems. It's also a good idea to know your test results and keep a list of the medicines you take. How can you care for yourself at home? · Reach and stay at a healthy weight. This will lower your risk for many problems, such as obesity, diabetes, heart disease, and high blood pressure. · Get at least 30 minutes of exercise on most days of the week. Walking is a good choice. You also may want to do other activities, such as running, swimming, cycling, or playing tennis or team sports. · Do not smoke. Smoking can make health problems worse. If you need help quitting, talk to your doctor about stop-smoking programs and medicines. These can increase your chances of quitting for good. · Protect your skin from too much sun. When you're outdoors from 10 a.m. to 4 p.m., stay in the shade or cover up with clothing and a hat with a wide brim. Wear sunglasses that block UV rays. Even when it's cloudy, put broad-spectrum sunscreen (SPF 30 or higher) on any exposed skin. · See a dentist one or two times a year for checkups and to have your teeth cleaned. · Wear a seat belt in the car. · Limit alcohol to 2 drinks a day for men and 1 drink a day for women. Too much alcohol can cause health problems. Follow your doctor's advice about when to have certain tests. These tests can spot problems early. For men and women · Cholesterol. Your doctor will tell you how often to have this done based on your overall health and other things that can increase your risk for heart attack and stroke. · Blood pressure.  Have your blood pressure checked during a routine doctor visit. Your doctor will tell you how often to check your blood pressure based on your age, your blood pressure results, and other factors. · Diabetes. Ask your doctor whether you should have tests for diabetes. · Vision. Experts recommend that you have yearly exams for glaucoma and other age-related eye problems. · Hearing. Tell your doctor if you notice any change in your hearing. You can have tests to find out how well you hear. · Colon cancer tests. Keep having colon cancer tests as your doctor recommends. You can have one of several types of tests. · Heart attack and stroke risk. At least every 4 to 6 years, you should have your risk for heart attack and stroke assessed. Your doctor uses factors such as your age, blood pressure, cholesterol, and whether you smoke or have diabetes to show what your risk for a heart attack or stroke is over the next 10 years. · Osteoporosis. Talk to your doctor about whether you should have a bone density test to find out whether you have thinning bones. Also ask your doctor about whether you should take calcium and vitamin D supplements. For women · Pap test and pelvic exam. You may no longer need a Pap test. Talk with your doctor about whether to stop or continue to have Pap tests. · Breast exam and mammogram. Ask how often you should have a mammogram, which is an X-ray of your breasts. A mammogram can spot breast cancer before it can be felt and when it is easiest to treat. · Thyroid disease. Talk to your doctor about whether to have your thyroid checked as part of a regular physical exam. Women have an increased chance of a thyroid problem. For men · Prostate exam. Talk to your doctor about whether you should have a blood test (called a PSA test) for prostate cancer. Experts disagree on whether men should have this test. Some experts recommend that you discuss the benefits and risks of the test with your doctor. · Abdominal aortic aneurysm. Ask your doctor whether you should have a test to check for an aneurysm. You may need a test if you ever smoked or if your parent, brother, sister, or child has had an aneurysm. When should you call for help? Watch closely for changes in your health, and be sure to contact your doctor if you have any problems or symptoms that concern you. Where can you learn more? Go to http://diego-beatriz.info/. Enter F013 in the search box to learn more about \"Well Visit, Over 65: Care Instructions. \" Current as of: May 12, 2017 Content Version: 11.4 © 1307-8358 [employer]. Care instructions adapted under license by [employer] (which disclaims liability or warranty for this information). If you have questions about a medical condition or this instruction, always ask your healthcare professional. [address] any warranty or liability for your use of this information. Well Visit, Over 72: Care Instructions Your Care Instructions Physical exams can help you stay healthy. Your doctor has checked your overall health and may have suggested ways to take good care of yourself. He or she also may have recommended tests. At home, you can help prevent illness with healthy eating, regular exercise, and other steps. Follow-up care is a key part of your treatment and safety. Be sure to make and go to all appointments, and call your doctor if you are having problems. It's also a good idea to know your test results and keep a list of the medicines you take. How can you care for yourself at home? · Reach and stay at a healthy weight. This will lower your risk for many problems, such as obesity, diabetes, heart disease, and high blood pressure. · Get at least 30 minutes of exercise on most days of the week. Walking is a good choice.  You also may want to do other activities, such as running, swimming, cycling, or playing tennis or team sports. · Do not smoke. Smoking can make health problems worse. If you need help quitting, talk to your doctor about stop-smoking programs and medicines. These can increase your chances of quitting for good. · Protect your skin from too much sun. When you're outdoors from 10 a.m. to 4 p.m., stay in the shade or cover up with clothing and a hat with a wide brim. Wear sunglasses that block UV rays. Even when it's cloudy, put broad-spectrum sunscreen (SPF 30 or higher) on any exposed skin. · See a dentist one or two times a year for checkups and to have your teeth cleaned. · Wear a seat belt in the car. · Limit alcohol to 2 drinks a day for men and 1 drink a day for women. Too much alcohol can cause health problems. Follow your doctor's advice about when to have certain tests. These tests can spot problems early. For men and women · Cholesterol. Your doctor will tell you how often to have this done based on your overall health and other things that can increase your risk for heart attack and stroke. · Blood pressure. Have your blood pressure checked during a routine doctor visit. Your doctor will tell you how often to check your blood pressure based on your age, your blood pressure results, and other factors. · Diabetes. Ask your doctor whether you should have tests for diabetes. · Vision. Experts recommend that you have yearly exams for glaucoma and other age-related eye problems. · Hearing. Tell your doctor if you notice any change in your hearing. You can have tests to find out how well you hear. · Colon cancer tests. Keep having colon cancer tests as your doctor recommends. You can have one of several types of tests. · Heart attack and stroke risk. At least every 4 to 6 years, you should have your risk for heart attack and stroke assessed.  Your doctor uses factors such as your age, blood pressure, cholesterol, and whether you smoke or have diabetes to show what your risk for a heart attack or stroke is over the next 10 years. · Osteoporosis. Talk to your doctor about whether you should have a bone density test to find out whether you have thinning bones. Also ask your doctor about whether you should take calcium and vitamin D supplements. For women · Pap test and pelvic exam. You may no longer need a Pap test. Talk with your doctor about whether to stop or continue to have Pap tests. · Breast exam and mammogram. Ask how often you should have a mammogram, which is an X-ray of your breasts. A mammogram can spot breast cancer before it can be felt and when it is easiest to treat. · Thyroid disease. Talk to your doctor about whether to have your thyroid checked as part of a regular physical exam. Women have an increased chance of a thyroid problem. For men · Prostate exam. Talk to your doctor about whether you should have a blood test (called a PSA test) for prostate cancer. Experts disagree on whether men should have this test. Some experts recommend that you discuss the benefits and risks of the test with your doctor. · Abdominal aortic aneurysm. Ask your doctor whether you should have a test to check for an aneurysm. You may need a test if you ever smoked or if your parent, brother, sister, or child has had an aneurysm. When should you call for help? Watch closely for changes in your health, and be sure to contact your doctor if you have any problems or symptoms that concern you. Where can you learn more? Go to http://diego-beatriz.info/. Enter Z452 in the search box to learn more about \"Well Visit, Over 65: Care Instructions. \" Current as of: May 12, 2017 Content Version: 11.4 © 1034-9306 [employer]. Care instructions adapted under license by [employer] (which disclaims liability or warranty for this information).  If you have questions about a medical condition or this instruction, always ask your healthcare professional. [address] any warranty or liability for your use of this information. Introducing [facility]! Dear Sonny Lorenzo: Thank you for requesting a [employer] account. Our records indicate that you already have an active [employer] account. You can access your account anytime at [email or web address] Did you know that you can access your hospital and ER discharge instructions at any time in [employer]? You can also review all of your test results from your hospital stay or ER visit. Additional Information If you have questions, please visit the Frequently Asked Questions section of the [employer] website at [email or web address] Remember, [employer] is NOT to be used for urgent needs. For medical emergencies, dial 911. Now available from your iPhone and Android! Please provide this summary of care documentation to your next provider. Your primary care clinician is listed as JCARLOS Javier. If you have any questions after today's visit, please call [phone].

## 2017-11-21 NOTE — PATIENT INSTRUCTIONS
Medicare Wellness Visit, Female    The best way to live healthy is to have a healthy lifestyle by eating a well-balanced diet, exercising regularly, limiting alcohol and stopping smoking. Regular physical exams and screening tests are another way to keep healthy. Preventive exams provided by your health care provider can find health problems before they become diseases or illnesses. Preventive services including immunizations, screening tests, monitoring and exams can help you take care of your own health. All people over age 72 should have a pneumovax  and and a prevnar shot to prevent pneumonia. These are once in a lifetime unless you and your provider decide differently. All people over 65 should have a yearly flu shot and a tetanus vaccine every 10 years. A bone mass density to screen for osteoporosis or thinning of the bones should be done every 2 years after 65. Screening for diabetes mellitus with a blood sugar test should be done every year. Glaucoma is a disease of the eye due to increased ocular pressure that can lead to blindness and it should be done every year by an eye professional.    Cardiovascular screening tests that check for elevated lipids (fatty part of blood) which can lead to heart disease and strokes should be done every 5 years. Colorectal screening that evaluates for blood or polyps in your colon should be done yearly as a stool test or every five years as a flexible sigmoidoscope or every 10 years as a colonoscopy up to age 76. Breast cancer screening with a mammogram is recommended biennially  for women age 54-69. Screening for cervical cancer with a pap smear and pelvic exam is recommended for women after age 72 years every 2 years up to age 79 or when the provider and patient decide to stop. If there is a history of cervical abnormalities or other increased risk for cancer then the test is recommended yearly.     Hepatitis C screening is also recommended for anyone born between [birth year] through [address]. A shingles vaccine is also recommended once in a lifetime after age 61. Your Medicare Wellness Exam is recommended annually. Here is a list of your current Health Maintenance items with a due date:  Health Maintenance Due   Topic Date Due    DTaP/Tdap/Td  (1 - Tdap) 07/03/1973    Pneumococcal Vaccine (1 of 2 - PCV13) 07/03/2017    Annual Well Visit  11/09/2017         Vaccine Information Statement     Pneumococcal Conjugate Vaccine (PCV13): What You Need to Know    Many Vaccine Information Statements are available in [place] and other languages. See www.immunize.org/vis. Hojas de información Sobre Vacunas están disponibles en español y en muchos otros idiomas. Visite www.immunize.org/vis. 1. Why get vaccinated? Vaccination can protect both children and adults from pneumococcal disease. Pneumococcal disease is caused by bacteria that can spread from person to person through close contact. It can cause ear infections, and it can also lead to more serious infections of the:   Lungs (pneumonia),   Blood (bacteremia), and   Covering of the brain and spinal cord (meningitis). Pneumococcal pneumonia is most common among adults. Pneumococcal meningitis can cause deafness and brain damage, and it kills about 1 child in 10 who get it. Anyone can get pneumococcal disease, but children under 3years of age and adults 72 years and older, people with certain medical conditions, and cigarette smokers are at the highest risk. Before there was a vaccine, the [facility] saw:   more than 700 cases of meningitis,   about 13,000 blood infections,   about 5 million ear infections, and   about 200 deaths  in children under 5 each year from pneumococcal disease. Since vaccine became available, severe pneumococcal disease in these children has fallen by 88%. About 18,000 older adults die of pneumococcal disease each year in the [place].     Treatment of pneumococcal infections with penicillin and other drugs is not as effective as it used to be, because some strains of the disease have become resistant to these drugs. This makes prevention of the disease, through vaccination, even more important. 2. PCV13 vaccine    Pneumococcal conjugate vaccine (called PCV13) protects against 13 types of pneumococcal bacteria. PCV13 is routinely given to children at 2, 4, 6, and 1515 months of age. It is also recommended for children and adults 3to 59years of age with certain health conditions, and for all adults 72years of age and older. Your doctor can give you details. 3. Some people should not get this vaccine    Anyone who has ever had a life-threatening allergic reaction to a dose of this vaccine, to an earlier pneumococcal vaccine called PCV7, or to any vaccine containing diphtheria toxoid (for example, DTaP), should not get PCV13. Anyone with a severe allergy to any component of PCV13 should not get the vaccine. Tell your doctor if the person being vaccinated has any severe allergies. If the person scheduled for vaccination is not feeling well, your healthcare provider might decide to reschedule the shot on another day. 4. Risks of a vaccine reaction    With any medicine, including vaccines, there is a chance of reactions. These are usually mild and go away on their own, but serious reactions are also possible. Problems reported following PCV13 varied by age and dose in the series. The most common problems reported among children were:    About half became drowsy after the shot, had a temporary loss of appetite, or had redness or tenderness where the shot was given.  About 1 out of 3 had swelling where the shot was given.  About 1 out of 3 had a mild fever, and about 1 in 20 had a fever over 102.2°F.   Up to about 8 out of 10 became fussy or irritable.      Adults have reported pain, redness, and swelling where the shot was given; also mild fever, fatigue, headache, chills, or muscle pain. Olimpia Martinez children who get PCV13 along with inactivated flu vaccine at the same time may be at increased risk for seizures caused by fever. Ask your doctor for more information. Problems that could happen after any vaccine:     People sometimes faint after a medical procedure, including vaccination. Sitting or lying down for about 15 minutes can help prevent fainting, and injuries caused by a fall. Tell your doctor if you feel dizzy, or have vision changes or ringing in the ears.  Some older children and adults get severe pain in the shoulder and have difficulty moving the arm where a shot was given. This happens very rarely.  Any medication can cause a severe allergic reaction. Such reactions from a vaccine are very rare, estimated at about 1 in a million doses, and would happen within a few minutes to a few hours after the vaccination. As with any medicine, there is a very small chance of a vaccine causing a serious injury or death. The safety of vaccines is always being monitored. For more information, visit: www.cdc.gov/vaccinesafety/     5. What if there is a serious reaction? What should I look for?  Look for anything that concerns you, such as signs of a severe allergic reaction, very high fever, or unusual behavior. Signs of a severe allergic reaction can include hives, swelling of the face and throat, difficulty breathing, a fast heartbeat, dizziness, and weakness  usually within a few minutes to a few hours after the vaccination. What should I do?  If you think it is a severe allergic reaction or other emergency that cant wait, call 9-1-1 or get the person to the nearest hospital. Otherwise, call your doctor. Reactions should be reported to the Vaccine Adverse Event Reporting System (VAERS). Your doctor should file this report, or you can do it yourself through the VAERS web site at www.vaers. hhs.gov, or by calling 2-985.198.1166. [facility] does not give medical advice. 6. The National Vaccine Injury Compensation Program    The [facility] Vaccine Injury Compensation Program (VICP) is a federal program that was created to compensate people who may have been injured by certain vaccines. Persons who believe they may have been injured by a vaccine can learn about the program and about filing a claim by calling 3-215.896.8383 or visiting the [address] website at www.[facility].gov/vaccinecompensation. There is a time limit to file a claim for compensation. 7. How can I learn more?  Ask your healthcare provider. He or she can give you the vaccine package insert or suggest other sources of information.  Call your local or state health department.  Contact the Centers for Disease Control and Prevention (CDC):  - Call 5-161.396.3810 (1-800-CDC-INFO) or  - Visit CDCs website at www.cdc.gov/vaccines    Vaccine Information Statement   PCV13 Vaccine   11/5/2015   [address] [id number]    Department of Health and Human Services  Centers for Disease Control and Prevention    Office Use Only       Well Visit, Over 72: Care Instructions  Your Care Instructions    Physical exams can help you stay healthy. Your doctor has checked your overall health and may have suggested ways to take good care of yourself. He or she also may have recommended tests. At home, you can help prevent illness with healthy eating, regular exercise, and other steps. Follow-up care is a key part of your treatment and safety. Be sure to make and go to all appointments, and call your doctor if you are having problems. It's also a good idea to know your test results and keep a list of the medicines you take. How can you care for yourself at home? · Reach and stay at a healthy weight. This will lower your risk for many problems, such as obesity, diabetes, heart disease, and high blood pressure. · Get at least 30 minutes of exercise on most days of the week. Walking is a good choice. You also may want to do other activities, such as running, swimming, cycling, or playing tennis or team sports. · Do not smoke. Smoking can make health problems worse. If you need help quitting, talk to your doctor about stop-smoking programs and medicines. These can increase your chances of quitting for good. · Protect your skin from too much sun. When you're outdoors from 10 a.m. to 4 p.m., stay in the shade or cover up with clothing and a hat with a wide brim. Wear sunglasses that block UV rays. Even when it's cloudy, put broad-spectrum sunscreen (SPF 30 or higher) on any exposed skin. · See a dentist one or two times a year for checkups and to have your teeth cleaned. · Wear a seat belt in the car. · Limit alcohol to 2 drinks a day for men and 1 drink a day for women. Too much alcohol can cause health problems. Follow your doctor's advice about when to have certain tests. These tests can spot problems early. For men and women  · Cholesterol. Your doctor will tell you how often to have this done based on your overall health and other things that can increase your risk for heart attack and stroke. · Blood pressure. Have your blood pressure checked during a routine doctor visit. Your doctor will tell you how often to check your blood pressure based on your age, your blood pressure results, and other factors. · Diabetes. Ask your doctor whether you should have tests for diabetes. · Vision. Experts recommend that you have yearly exams for glaucoma and other age-related eye problems. · Hearing. Tell your doctor if you notice any change in your hearing. You can have tests to find out how well you hear. · Colon cancer tests. Keep having colon cancer tests as your doctor recommends. You can have one of several types of tests. · Heart attack and stroke risk. At least every 4 to 6 years, you should have your risk for heart attack and stroke assessed.  Your doctor uses factors such as your age, blood pressure, cholesterol, and whether you smoke or have diabetes to show what your risk for a heart attack or stroke is over the next 10 years. · Osteoporosis. Talk to your doctor about whether you should have a bone density test to find out whether you have thinning bones. Also ask your doctor about whether you should take calcium and vitamin D supplements. For women  · Pap test and pelvic exam. You may no longer need a Pap test. Talk with your doctor about whether to stop or continue to have Pap tests. · Breast exam and mammogram. Ask how often you should have a mammogram, which is an X-ray of your breasts. A mammogram can spot breast cancer before it can be felt and when it is easiest to treat. · Thyroid disease. Talk to your doctor about whether to have your thyroid checked as part of a regular physical exam. Women have an increased chance of a thyroid problem. For men  · Prostate exam. Talk to your doctor about whether you should have a blood test (called a PSA test) for prostate cancer. Experts disagree on whether men should have this test. Some experts recommend that you discuss the benefits and risks of the test with your doctor. · Abdominal aortic aneurysm. Ask your doctor whether you should have a test to check for an aneurysm. You may need a test if you ever smoked or if your parent, brother, sister, or child has had an aneurysm. When should you call for help? Watch closely for changes in your health, and be sure to contact your doctor if you have any problems or symptoms that concern you. Where can you learn more? Go to http://diego-beatriz.info/. Enter G516 in the search box to learn more about \"Well Visit, Over 65: Care Instructions. \"  Current as of: May 12, 2017  Content Version: 11.4  © 0472-7765 [employer]. Care instructions adapted under license by [employer] (which disclaims liability or warranty for this information).  If you have questions about a medical condition or this instruction, always ask your healthcare professional. [address] any warranty or liability for your use of this information. Well Visit, Over 72: Care Instructions  Your Care Instructions    Physical exams can help you stay healthy. Your doctor has checked your overall health and may have suggested ways to take good care of yourself. He or she also may have recommended tests. At home, you can help prevent illness with healthy eating, regular exercise, and other steps. Follow-up care is a key part of your treatment and safety. Be sure to make and go to all appointments, and call your doctor if you are having problems. It's also a good idea to know your test results and keep a list of the medicines you take. How can you care for yourself at home? · Reach and stay at a healthy weight. This will lower your risk for many problems, such as obesity, diabetes, heart disease, and high blood pressure. · Get at least 30 minutes of exercise on most days of the week. Walking is a good choice. You also may want to do other activities, such as running, swimming, cycling, or playing tennis or team sports. · Do not smoke. Smoking can make health problems worse. If you need help quitting, talk to your doctor about stop-smoking programs and medicines. These can increase your chances of quitting for good. · Protect your skin from too much sun. When you're outdoors from 10 a.m. to 4 p.m., stay in the shade or cover up with clothing and a hat with a wide brim. Wear sunglasses that block UV rays. Even when it's cloudy, put broad-spectrum sunscreen (SPF 30 or higher) on any exposed skin. · See a dentist one or two times a year for checkups and to have your teeth cleaned. · Wear a seat belt in the car. · Limit alcohol to 2 drinks a day for men and 1 drink a day for women. Too much alcohol can cause health problems.   Follow your doctor's advice about when to have certain tests. These tests can spot problems early. For men and women  · Cholesterol. Your doctor will tell you how often to have this done based on your overall health and other things that can increase your risk for heart attack and stroke. · Blood pressure. Have your blood pressure checked during a routine doctor visit. Your doctor will tell you how often to check your blood pressure based on your age, your blood pressure results, and other factors. · Diabetes. Ask your doctor whether you should have tests for diabetes. · Vision. Experts recommend that you have yearly exams for glaucoma and other age-related eye problems. · Hearing. Tell your doctor if you notice any change in your hearing. You can have tests to find out how well you hear. · Colon cancer tests. Keep having colon cancer tests as your doctor recommends. You can have one of several types of tests. · Heart attack and stroke risk. At least every 4 to 6 years, you should have your risk for heart attack and stroke assessed. Your doctor uses factors such as your age, blood pressure, cholesterol, and whether you smoke or have diabetes to show what your risk for a heart attack or stroke is over the next 10 years. · Osteoporosis. Talk to your doctor about whether you should have a bone density test to find out whether you have thinning bones. Also ask your doctor about whether you should take calcium and vitamin D supplements. For women  · Pap test and pelvic exam. You may no longer need a Pap test. Talk with your doctor about whether to stop or continue to have Pap tests. · Breast exam and mammogram. Ask how often you should have a mammogram, which is an X-ray of your breasts. A mammogram can spot breast cancer before it can be felt and when it is easiest to treat. · Thyroid disease. Talk to your doctor about whether to have your thyroid checked as part of a regular physical exam. Women have an increased chance of a thyroid problem.   For men  · Prostate exam. Talk to your doctor about whether you should have a blood test (called a PSA test) for prostate cancer. Experts disagree on whether men should have this test. Some experts recommend that you discuss the benefits and risks of the test with your doctor. · Abdominal aortic aneurysm. Ask your doctor whether you should have a test to check for an aneurysm. You may need a test if you ever smoked or if your parent, brother, sister, or child has had an aneurysm. When should you call for help? Watch closely for changes in your health, and be sure to contact your doctor if you have any problems or symptoms that concern you. Where can you learn more? Go to http://diego-beatriz.info/. Enter E007 in the search box to learn more about \"Well Visit, Over 65: Care Instructions. \"  Current as of: May 12, 2017  Content Version: 11.4  © 7502-9451 [employer]. Care instructions adapted under license by [employer] (which disclaims liability or warranty for this information). If you have questions about a medical condition or this instruction, always ask your healthcare professional. [address] any warranty or liability for your use of this information.

## 2017-11-21 NOTE — PROGRESS NOTES
1. Have you been to the ER, urgent care clinic since your last visit? Hospitalized since your last visit? No.     2. Have you seen or consulted any other health care providers outside of the [address] [place] [address] since your last visit? Include any pap smears or colon screening. [facility].      Chief Complaint   Patient presents with   [facility] Wellness Visit

## 2018-01-17 ENCOUNTER — OFFICE VISIT (OUTPATIENT)
Dept: FAMILY MEDICINE CLINIC | Age: 68
End: 2018-01-17

## 2018-01-17 VITALS
SYSTOLIC BLOOD PRESSURE: 152 MMHG | WEIGHT: 219 LBS | DIASTOLIC BLOOD PRESSURE: 88 MMHG | TEMPERATURE: 96.6 F | HEIGHT: 64 IN | BODY MASS INDEX: 37.39 KG/M2 | RESPIRATION RATE: 16 BRPM | HEART RATE: 72 BPM

## 2018-01-17 DIAGNOSIS — J32.0 LEFT MAXILLARY SINUSITIS: Primary | ICD-10-CM

## 2018-01-17 RX ORDER — AZITHROMYCIN 250 MG/1
TABLET, FILM COATED ORAL
Qty: 6 TAB | Refills: 0 | Status: SHIPPED | OUTPATIENT
Start: 2018-01-17 | End: 2018-02-22 | Stop reason: ALTCHOICE

## 2018-01-17 NOTE — MR AVS SNAPSHOT
Keren Lira [place] [address] [facility] [address] [id number] 
[phone] Patient: Kristin Tucker MRN: [id number] Visit Information Date & Time Provider Department Dept. Phone Encounter #  
 1/17/2018  8:15 AM Marylou Roman, [address] [place] [address] [phone] [id number] Your Appointments 2/15/2018  8:30 AM  
LAB with [facility] [address] ([address]) Appt Note: labs [facility] [place]  
  
   
 [address]  
  
    
 2/22/2018  9:30 AM  
Follow Up with MD Frandy Montoya 23 [facility] [facility]) Appt Note: fu appt [facility] [address]  
  
   
 [address]  
  
    
 11/20/2018  1:30 PM  
Office Visit with MD Frandy Montoya 23 [address] [place] [address]) Appt Note: [address] [place] [address] Rd [address]  
  
   
 [address] Upcoming Health Maintenance Date Due DTaP/Tdap/Td series (1 - Tdap) 7/3/1971 MEDICARE YEARLY EXAM 11/22/2018 GLAUCOMA SCREENING Q2Y 6/13/2019 BREAST CANCER SCRN MAMMOGRAM 11/16/2019 Pneumococcal 65+ High/Highest Risk (2 of 2 - PPSV23) 11/17/2020 COLONOSCOPY 11/4/2024 Allergies as of 1/17/2018  Review Complete On: 1/17/2018 By: Marylou Roman MD  
  
 Severity Noted Reaction Type Reactions Penicillin G  09/08/2016    Hives Current Immunizations  Reviewed on 11/21/2017 Name Date Influenza High Dose Vaccine PF 10/12/2017  1:44 PM  
 Influenza Vaccine (Quad) PF 9/29/2016 Pneumococcal Conjugate (PCV-13) 11/21/2017 Pneumococcal Polysaccharide (PPSV-23) 11/17/2015 Zoster Vaccine, Live 6/11/2013 Not reviewed this visit You Were Diagnosed With   
  
 Codes Comments Left maxillary sinusitis    -  Primary ICD-10-CM: J32.0 ICD-9-CM: 473.0 Vitals BP Pulse Temp Resp Height(growth percentile) Weight(growth percentile) 152/88 72 96.6 °F (35.9 °C) (Oral) 16 5' 4\" (1.626 m) 219 lb (99.3 kg) BMI OB Status Smoking Status 37.59 kg/m2 Hysterectomy Never Smoker BMI and BSA Data Body Mass Index Body Surface Area  
 37.59 kg/m 2 2.12 m 2 Preferred Pharmacy Pharmacy Name Phone [facility], [address] [place] [address] Drive [phone] Your Updated Medication List  
  
   
This list is accurate as of: 1/17/18  8:48 AM.  Always use your most recent med list.  
  
  
  
  
 albuterol 90 mcg/actuation inhaler Commonly known as:  PROVENTIL HFA, VENTOLIN HFA, PROAIR HFA Take 2 Puffs by inhalation every six (6) hours as needed for Wheezing. AYR SALINE 0.65 % nasal spray Generic drug:  sodium chloride 1 Spray as needed for Congestion. azithromycin 250 mg tablet Commonly known as:  Hortensia Clarice Take two tablets today then one tablet daily  
  
 budesonide-formoterol 80-4.5 mcg/actuation Hfaa Commonly known as:  SYMBICORT Take 2 Puffs by inhalation two (2) times a day. Use for 2 weeks at a time. CALCIUM 600 + D 600-125 mg-unit Tab Generic drug:  calcium-cholecalciferol (d3) Take 600 mg by mouth daily. COMBIGAN 0.2-0.5 % Drop ophthalmic solution Generic drug:  brimonidine-timolol Administer 1 Drop to left eye daily. folic acid 1 mg tablet Commonly known as:  Google Take  by mouth daily. GAVISCON  mg/15 mL suspension Generic drug:  aluminum hydrox-magnesium carb Take 15 mL by mouth three (3) times daily. Peak Flow Meter Bridgette  
1 Units by Does Not Apply route daily. PriLOSEC 20 mg capsule Generic drug:  omeprazole Take 20 mg by mouth daily. rivaroxaban 20 mg Tab tablet Commonly known as:  Renae Martinez Take 1 Tab by mouth daily (with breakfast). STOOL SOFTENER 100 mg capsule Generic drug:  docusate sodium Take 100 mg by mouth daily. tiZANidine 2 mg tablet Commonly known as:  Tresia Charles Take 1 tablet 3 times a day as needed TYLENOL EXTRA STRENGTH 500 mg tablet Generic drug:  acetaminophen Take 500 mg by mouth every four (4) hours as needed for Pain.  
  
 valsartan-hydroCHLOROthiazide 80-12.5 mg per tablet Commonly known as:  DIOVAN-HCT Take 1 Tab by mouth two (2) times a day. ZANTAC 150 mg tablet Generic drug:  raNITIdine Take  by mouth as needed for Indigestion. ZyrTEC 10 mg Cap Generic drug:  Cetirizine Take 10 mg by mouth as needed. Prescriptions Sent to Pharmacy Refills  
 azithromycin (ZITHROMAX) 250 mg tablet 0 Sig: Take two tablets today then one tablet daily Class: Normal  
 Pharmacy: [facility] [id number] [place] [address] Ph #: [phone] Patient Instructions Sinusitis: Care Instructions Your Care Instructions Sinusitis is an infection of the lining of the sinus cavities in your head. Sinusitis often follows a cold. It causes pain and pressure in your head and face. In most cases, sinusitis gets better on its own in 1 to 2 weeks. But some mild symptoms may last for several weeks. Sometimes antibiotics are needed. Follow-up care is a key part of your treatment and safety. Be sure to make and go to all appointments, and call your doctor if you are having problems. It's also a good idea to know your test results and keep a list of the medicines you take. How can you care for yourself at home? · Take an over-the-counter pain medicine, such as acetaminophen (Tylenol), ibuprofen (Advil, Motrin), or naproxen (Aleve). Read and follow all instructions on the label. · If the doctor prescribed antibiotics, take them as directed.  Do not stop taking them just because you feel better. You need to take the full course of antibiotics. · Be careful when taking over-the-counter cold or flu medicines and Tylenol at the same time. Many of these medicines have acetaminophen, which is Tylenol. Read the labels to make sure that you are not taking more than the recommended dose. Too much acetaminophen (Tylenol) can be harmful. · Breathe warm, moist air from a steamy shower, a hot bath, or a sink filled with hot water. Avoid cold, dry air. Using a humidifier in your home may help. Follow the directions for cleaning the machine. · Use saline (saltwater) nasal washes to help keep your nasal passages open and wash out mucus and bacteria. You can buy saline nose drops at a grocery store or drugstore. Or you can make your own at home by adding 1 teaspoon of salt and 1 teaspoon of baking soda to 2 cups of distilled water. If you make your own, fill a bulb syringe with the solution, insert the tip into your nostril, and squeeze gently. Dwaine Sell your nose. · Put a hot, wet towel or a warm gel pack on your face 3 or 4 times a day for 5 to 10 minutes each time. · Try a decongestant nasal spray like oxymetazoline (Afrin). Do not use it for more than 3 days in a row. Using it for more than 3 days can make your congestion worse. When should you call for help? Call your doctor now or seek immediate medical care if: 
? · You have new or worse swelling or redness in your face or around your eyes. ? · You have a new or higher fever. ? Watch closely for changes in your health, and be sure to contact your doctor if: 
? · You have new or worse facial pain. ? · The mucus from your nose becomes thicker (like pus) or has new blood in it. ? · You are not getting better as expected. Where can you learn more? Go to http://diego-beatriz.info/. Enter I275 in the search box to learn more about \"Sinusitis: Care Instructions. \" Current as of: May 12, 2017 Content Version: 11.4 © 7202-5879 [employer]bi. Care instructions adapted under license by I.System[employer] (which disclaims liability or warranty for this information). If you have questions about a medical condition or this instruction, always ask your healthcare professional. [address] any warranty or liability for your use of this information. Please use some afrin for 5 days. Introducing [facility]! Dear Elisa Ramírez: Thank you for requesting a Camiant account. Our records indicate that you already have an active Camiant account. You can access your account anytime at [email or web address] Did you know that you can access your hospital and ER discharge instructions at any time in Camiant? You can also review all of your test results from your hospital stay or ER visit. Additional Information If you have questions, please visit the Frequently Asked Questions section of the Camiant website at [email or web address] Remember, Camiant is NOT to be used for urgent needs. For medical emergencies, dial 911. Now available from your iPhone and Android! Please provide this summary of care documentation to your next provider. Your primary care clinician is listed as JCARLOS Javier. If you have any questions after today's visit, please call [phone].

## 2018-01-17 NOTE — PROGRESS NOTES
1. Have you been to the ER, urgent care clinic since your last visit? Hospitalized since your last visit? No.     2. Have you seen or consulted any other health care providers outside of the [address] [place] [address] since your last visit? Include any pap smears or colon screening. No.    Chief Complaint   Patient presents with    Sinus Infection    Headache     left side     Numbness     Left side face yesterday    Been taking OTC Excedrin migraine and OTC allergy pills.

## 2018-01-17 NOTE — PROGRESS NOTES
Kristin Tucker is a 74 y.o. female and presents with Sinus Infection; Headache (left side ); Numbness (Left side face yesterday ); and Nausea       Subjective:    One week of sx of left facial pain and sl numbness over left upper lip briefly after a nap but this has resolved. No other neurologic sx. . pain is in left maxillary area. Using nasal saline spray. Some chills but no fevers. +post nasal drip. Not much cough. Assessment/Plan:      Left maxillary sinusitis- will rx abx. Afrin for 5 days. She will call if not improving. RTC prn. Orders Placed This Encounter    azithromycin (ZITHROMAX) 250 mg tablet     Sig: Take two tablets today then one tablet daily     Dispense:  6 Tab     Refill:  0       ROS:  Negative except as mentioned above  Cardiac- no chest pain or palpitations  Pulmonary- no sob or wheezes  GI- no n/v or diarrhea. SH:  Social History   Substance Use Topics    Smoking status: Never Smoker    Smokeless tobacco: Never Used    Alcohol use No         Medications/Allergies:  Current Outpatient Prescriptions on File Prior to Visit   Medication Sig Dispense Refill    rivaroxaban (XARELTO) 20 mg tab tablet Take 1 Tab by mouth daily (with breakfast). 90 Tab 3    omeprazole (PRILOSEC) 20 mg capsule Take 20 mg by mouth daily.  aluminum hydrox-magnesium carb (GAVISCON)  mg/15 mL suspension Take 15 mL by mouth three (3) times daily.  budesonide-formoterol (SYMBICORT) 80-4.5 mcg/actuation HFAA inhaler Take 2 Puffs by inhalation two (2) times a day. Use for 2 weeks at a time. 1 Inhaler 1    Peak Flow Meter lanette 1 Units by Does Not Apply route daily. 1 Device 0    albuterol (PROVENTIL HFA, VENTOLIN HFA, PROAIR HFA) 90 mcg/actuation inhaler Take 2 Puffs by inhalation every six (6) hours as needed for Wheezing. 1 Inhaler 2    valsartan-hydroCHLOROthiazide (DIOVAN-HCT) 80-12.5 mg per tablet Take 1 Tab by mouth two (2) times a day.  60 Tab 5    tiZANidine (ZANAFLEX) 2 mg tablet Take 1 tablet 3 times a day as needed 30 Tab 0    acetaminophen (TYLENOL EXTRA STRENGTH) 500 mg tablet Take 500 mg by mouth every four (4) hours as needed for Pain.  folic acid (FOLVITE) 1 mg tablet Take  by mouth daily.  brimonidine-timolol (COMBIGAN) 0.2-0.5 % drop ophthalmic solution Administer 1 Drop to left eye daily.  ranitidine (ZANTAC) 150 mg tablet Take  by mouth as needed for Indigestion.  Cetirizine (ZYRTEC) 10 mg cap Take 10 mg by mouth as needed.  calcium-cholecalciferol, d3, (CALCIUM 600 + D) 600-125 mg-unit tab Take 600 mg by mouth daily.  docusate sodium (STOOL SOFTENER) 100 mg capsule Take 100 mg by mouth daily.  sodium chloride (AYR SALINE) 0.65 % nasal spray 1 Spray as needed for Congestion. No current facility-administered medications on file prior to visit. Allergies   Allergen Reactions    Penicillin G Hives       Objective:  Visit Vitals    /88    Pulse 72    Temp 96.6 °F (35.9 °C) (Oral)    Resp 16    Ht 5' 4\" (1.626 m)    Wt 219 lb (99.3 kg)    BMI 37.59 kg/m2    Body mass index is 37.59 kg/(m^2). Constitutional: Well developed, nourished, no distress, alert   HENT: Exterior ears and tympanic membranes normal bilaterally. Supple neck. No thyromegaly or lymphadenopathy. Oropharynx clear and moist mucous membranes. Nasal mucosa erythematous and yellow drainage present. Eyes: Conjunctiva normal. PERRL. CV: S1, S2.  RRR. No murmurs/rubs. Pulm: No abnormalities on inspection. Clear to auscultation bilaterally. No wheezing/rhonchi. Normal effort. GI: Soft, nontender, nondistended. Normal active bowel sounds.

## 2018-01-17 NOTE — PATIENT INSTRUCTIONS
Sinusitis: Care Instructions  Your Care Instructions    Sinusitis is an infection of the lining of the sinus cavities in your head. Sinusitis often follows a cold. It causes pain and pressure in your head and face. In most cases, sinusitis gets better on its own in 1 to 2 weeks. But some mild symptoms may last for several weeks. Sometimes antibiotics are needed. Follow-up care is a key part of your treatment and safety. Be sure to make and go to all appointments, and call your doctor if you are having problems. It's also a good idea to know your test results and keep a list of the medicines you take. How can you care for yourself at home? · Take an over-the-counter pain medicine, such as acetaminophen (Tylenol), ibuprofen (Advil, Motrin), or naproxen (Aleve). Read and follow all instructions on the label. · If the doctor prescribed antibiotics, take them as directed. Do not stop taking them just because you feel better. You need to take the full course of antibiotics. · Be careful when taking over-the-counter cold or flu medicines and Tylenol at the same time. Many of these medicines have acetaminophen, which is Tylenol. Read the labels to make sure that you are not taking more than the recommended dose. Too much acetaminophen (Tylenol) can be harmful. · Breathe warm, moist air from a steamy shower, a hot bath, or a sink filled with hot water. Avoid cold, dry air. Using a humidifier in your home may help. Follow the directions for cleaning the machine. · Use saline (saltwater) nasal washes to help keep your nasal passages open and wash out mucus and bacteria. You can buy saline nose drops at a grocery store or drugstore. Or you can make your own at home by adding 1 teaspoon of salt and 1 teaspoon of baking soda to 2 cups of distilled water. If you make your own, fill a bulb syringe with the solution, insert the tip into your nostril, and squeeze gently. Yelena Solian your nose.   · Put a hot, wet towel or a warm gel pack on your face 3 or 4 times a day for 5 to 10 minutes each time. · Try a decongestant nasal spray like oxymetazoline (Afrin). Do not use it for more than 3 days in a row. Using it for more than 3 days can make your congestion worse. When should you call for help? Call your doctor now or seek immediate medical care if:  ? · You have new or worse swelling or redness in your face or around your eyes. ? · You have a new or higher fever. ? Watch closely for changes in your health, and be sure to contact your doctor if:  ? · You have new or worse facial pain. ? · The mucus from your nose becomes thicker (like pus) or has new blood in it. ? · You are not getting better as expected. Where can you learn more? Go to http://diego-beatriz.info/. Enter L706 in the search box to learn more about \"Sinusitis: Care Instructions. \"  Current as of: May 12, 2017  Content Version: 11.4  © 9029-9714 WineNice. Care instructions adapted under license by [employer]STAR (which disclaims liability or warranty for this information). If you have questions about a medical condition or this instruction, always ask your healthcare professional. [address] any warranty or liability for your use of this information. Please use some afrin for 5 days.

## 2018-02-15 ENCOUNTER — HOSPITAL ENCOUNTER (OUTPATIENT)
Dept: LAB | Age: 68
Discharge: HOME | End: 2018-02-15
Payer: MEDICARE

## 2018-02-15 DIAGNOSIS — Z86.2 HISTORY OF SARCOIDOSIS: ICD-10-CM

## 2018-02-15 LAB
ANION GAP SERPL CALC-SCNC: 8 MMOL/L (ref 3–18)
BUN SERPL-MCNC: 31 MG/DL (ref 7–18)
BUN/CREAT SERPL: 25 (ref 12–20)
CALCIUM SERPL-MCNC: 8.9 MG/DL (ref 8.5–10.1)
CHLORIDE SERPL-SCNC: 108 MMOL/L (ref 100–108)
CO2 SERPL-SCNC: 27 MMOL/L (ref 21–32)
CREAT SERPL-MCNC: 1.26 MG/DL (ref 0.6–1.3)
ERYTHROCYTE [DISTWIDTH] IN BLOOD BY AUTOMATED COUNT: 16.3 % (ref 11.6–14.5)
GLUCOSE SERPL-MCNC: 117 MG/DL (ref 74–99)
HCT VFR BLD AUTO: 30.1 % (ref 35–45)
HGB BLD-MCNC: 9.6 G/DL (ref 12–16)
MCH RBC QN AUTO: 22.9 PG (ref 24–34)
MCHC RBC AUTO-ENTMCNC: 31.9 G/DL (ref 31–37)
MCV RBC AUTO: 71.7 FL (ref 74–97)
PLATELET # BLD AUTO: 193 K/UL (ref 135–420)
PMV BLD AUTO: 9.4 FL (ref 9.2–11.8)
POTASSIUM SERPL-SCNC: 4.6 MMOL/L (ref 3.5–5.5)
RBC # BLD AUTO: 4.2 M/UL (ref 4.2–5.3)
SODIUM SERPL-SCNC: 143 MMOL/L (ref 136–145)
WBC # BLD AUTO: 8.3 K/UL (ref 4.6–13.2)

## 2018-02-15 PROCEDURE — 85027 COMPLETE CBC AUTOMATED: CPT | Performed by: INTERNAL MEDICINE

## 2018-02-15 PROCEDURE — 36415 COLL VENOUS BLD VENIPUNCTURE: CPT | Performed by: INTERNAL MEDICINE

## 2018-02-15 PROCEDURE — 80048 BASIC METABOLIC PNL TOTAL CA: CPT | Performed by: INTERNAL MEDICINE

## 2018-02-22 ENCOUNTER — OFFICE VISIT (OUTPATIENT)
Dept: FAMILY MEDICINE CLINIC | Age: 68
End: 2018-02-22

## 2018-02-22 VITALS
RESPIRATION RATE: 16 BRPM | OXYGEN SATURATION: 99 % | BODY MASS INDEX: 38.35 KG/M2 | SYSTOLIC BLOOD PRESSURE: 159 MMHG | DIASTOLIC BLOOD PRESSURE: 78 MMHG | HEIGHT: 64 IN | HEART RATE: 84 BPM | TEMPERATURE: 97.9 F | WEIGHT: 224.6 LBS

## 2018-02-22 DIAGNOSIS — Z23 NEED FOR SHINGLES VACCINE: ICD-10-CM

## 2018-02-22 DIAGNOSIS — R73.9 ELEVATED BLOOD SUGAR: ICD-10-CM

## 2018-02-22 DIAGNOSIS — Z86.2 HISTORY OF SARCOIDOSIS: ICD-10-CM

## 2018-02-22 DIAGNOSIS — I10 ESSENTIAL HYPERTENSION: Primary | ICD-10-CM

## 2018-02-22 DIAGNOSIS — E66.9 OBESITY (BMI 30-39.9): ICD-10-CM

## 2018-02-22 RX ORDER — VALSARTAN AND HYDROCHLOROTHIAZIDE 160; 12.5 MG/1; MG/1
1 TABLET, FILM COATED ORAL DAILY
Qty: 90 TAB | Refills: 3 | Status: SHIPPED | OUTPATIENT
Start: 2018-02-22 | End: 2019-03-05 | Stop reason: DRUGHIGH

## 2018-02-22 NOTE — PROGRESS NOTES
1. Have you been to the ER, urgent care clinic since your last visit? Hospitalized since your last visit? No.     2. Have you seen or consulted any other health care providers outside of the [address] [place] [address] since your last visit? Include any pap smears or colon screening.  No.    Chief Complaint   Patient presents with    Follow Up Chronic Condition    Results     Labs

## 2018-02-22 NOTE — PATIENT INSTRUCTIONS

## 2018-02-22 NOTE — PROGRESS NOTES
Oracio Lucero is a 74 y.o. female and presents with Follow Up Chronic Condition and Results (Labs)       Subjective:    htn- taking meds (although missed dose this am). No cp or increased sob. H/o sarcoidosis-  Obesity- gained some weight. Hasn't been trying as much. Assessment/Plan:    htn- increasing valsartan dose. Wt loss encouraged   H/o sarcoidosis- sees pulmonary yearly. Obesity- encouraged wt loss- discussed wt loss program/meds/bariatric surgery- she will try again at home. Sickle beta thall- h/h stable- no changes  Elevated blood sugar- a1c next visit (recognizing issues with anemia). RTC in 4 months with labs. Orders Placed This Encounter    valsartan-hydroCHLOROthiazide (DIOVAN-HCT) 160-12.5 mg per tablet     Sig: Take 1 Tab by mouth daily. Dispense:  90 Tab     Refill:  3     Diagnoses and all orders for this visit:    1. Essential hypertension  -     valsartan-hydroCHLOROthiazide (DIOVAN-HCT) 160-12.5 mg per tablet; Take 1 Tab by mouth daily. 2. Obesity (BMI 30-39.9)    3. History of sarcoidosis    4. Elevated blood sugar  -     HEMOGLOBIN A1C WITH EAG; Future    5. Need for shingles vaccine  -     varicella-zoster recombinant, PF, (SHINGRIX, PF,) 50 mcg/0.5 mL susr injection; 0.5 mL by IntraMUSCular route once for 1 dose. To be done 2-6 months after initial immunization.  -     varicella-zoster recombinant, PF, (SHINGRIX, PF,) 50 mcg/0.5 mL susr injection; 0.5 mL by IntraMUSCular route once for 1 dose. ROS:  Negative except as mentioned above  Cardiac- no chest pain or palpitations  Pulmonary- no sob or wheezes  GI- no n/v or diarrhea. SH:  Social History   Substance Use Topics    Smoking status: Never Smoker    Smokeless tobacco: Never Used    Alcohol use No         Medications/Allergies:  Current Outpatient Prescriptions on File Prior to Visit   Medication Sig Dispense Refill    rivaroxaban (XARELTO) 20 mg tab tablet Take 1 Tab by mouth daily (with breakfast). 90 Tab 3    omeprazole (PRILOSEC) 20 mg capsule Take 20 mg by mouth daily.  aluminum hydrox-magnesium carb (GAVISCON)  mg/15 mL suspension Take 15 mL by mouth three (3) times daily.  budesonide-formoterol (SYMBICORT) 80-4.5 mcg/actuation HFAA inhaler Take 2 Puffs by inhalation two (2) times a day. Use for 2 weeks at a time. 1 Inhaler 1    Peak Flow Meter lanette 1 Units by Does Not Apply route daily. 1 Device 0    albuterol (PROVENTIL HFA, VENTOLIN HFA, PROAIR HFA) 90 mcg/actuation inhaler Take 2 Puffs by inhalation every six (6) hours as needed for Wheezing. 1 Inhaler 2    valsartan-hydroCHLOROthiazide (DIOVAN-HCT) 80-12.5 mg per tablet Take 1 Tab by mouth two (2) times a day. 60 Tab 5    tiZANidine (ZANAFLEX) 2 mg tablet Take 1 tablet 3 times a day as needed 30 Tab 0    acetaminophen (TYLENOL EXTRA STRENGTH) 500 mg tablet Take 500 mg by mouth every four (4) hours as needed for Pain.  folic acid (FOLVITE) 1 mg tablet Take  by mouth daily.  brimonidine-timolol (COMBIGAN) 0.2-0.5 % drop ophthalmic solution Administer 1 Drop to left eye daily.  ranitidine (ZANTAC) 150 mg tablet Take  by mouth as needed for Indigestion.  Cetirizine (ZYRTEC) 10 mg cap Take 10 mg by mouth as needed.  calcium-cholecalciferol, d3, (CALCIUM 600 + D) 600-125 mg-unit tab Take 600 mg by mouth daily.  docusate sodium (STOOL SOFTENER) 100 mg capsule Take 100 mg by mouth daily.  sodium chloride (AYR SALINE) 0.65 % nasal spray 1 Spray as needed for Congestion. No current facility-administered medications on file prior to visit. Allergies   Allergen Reactions    Penicillin G Hives       Objective:  Visit Vitals    /78    Pulse 84    Temp 97.9 °F (36.6 °C) (Oral)    Resp 16    Ht 5' 4\" (1.626 m)    Wt 224 lb 9.6 oz (101.9 kg)    SpO2 99%    BMI 38.55 kg/m2    Body mass index is 38.55 kg/(m^2).   Constitutional: Well developed, nourished, no distress, alert   CV: S1, S2.  RRR. No murmurs/rubs. No thrills palpated. No carotid bruits. Intact distal pulses. No edema. Pulm: No abnormalities on inspection. Clear to auscultation bilaterally. No wheezing/rhonchi. Normal effort. GI: Soft, nontender, nondistended. Normal active bowel sounds. No  masses on palpation. No hepatosplenomegaly.

## 2018-02-22 NOTE — MR AVS SNAPSHOT
Keren Lira [place] [address] [facility] [address] [address] 
[phone] Patient: Sosa Carmona MRN: [id number] HLS:9/4/[birth year]252 Visit Information Date & Time Provider Department Dept. Phone Encounter #  
 2/22/2018  9:30 AM Magdalena Acuna, [address] [place] [address] [phone] [id number] Follow-up Instructions Return in about 4 months (around 6/22/2018) for with labs prior. [facility] Your Appointments 11/20/2018  1:30 PM  
Office Visit with Magdalena Acuna MD  
[address]) Appt Note: 295 [facility] 68 [facility] [address]  
  
   
 [address] [place] [address] [address] Upcoming Health Maintenance Date Due DTaP/Tdap/Td series (1 - Tdap) 7/3/1973 MEDICARE YEARLY EXAM 11/22/2018 GLAUCOMA SCREENING Q2Y 6/13/2019 BREAST CANCER SCRN MAMMOGRAM 11/16/2019 Pneumococcal 65+ High/Highest Risk (2 of 2 - PPSV23) 11/17/2020 COLONOSCOPY 11/4/2024 Allergies as of 2/22/2018  Review Complete On: 2/22/2018 By: Magdalena Acuna MD  
  
 Severity Noted Reaction Type Reactions Penicillin G  09/08/2016    Hives Current Immunizations  Reviewed on 11/21/2017 Name Date Influenza High Dose Vaccine PF 10/12/2017  1:44 PM  
 Influenza Vaccine (Quad) PF 9/29/2016 Pneumococcal Conjugate (PCV-13) 11/21/2017 Pneumococcal Polysaccharide (PPSV-23) 11/17/2015 Zoster Vaccine, Live 6/11/2013 Not reviewed this visit You Were Diagnosed With   
  
 Codes Comments Essential hypertension    -  Primary ICD-10-CM: I10 
ICD-9-CM: 401.9 Obesity (BMI 30-39. 9)     ICD-10-CM: E66.9 ICD-9-CM: 278.00 History of sarcoidosis     ICD-10-CM: Z86.2 ICD-9-CM: V12.29 Elevated blood sugar     ICD-10-CM: R73.9 ICD-9-CM: 790.29 Need for shingles vaccine     ICD-10-CM: V12 ICD-9-CM: V04.89 Vitals BP Pulse Temp Resp Height(growth percentile) Weight(growth percentile) 159/78 84 97.9 °F (36.6 °C) (Oral) 16 5' 4\" (1.626 m) 224 lb 9.6 oz (101.9 kg) SpO2 BMI OB Status Smoking Status 99% 38.55 kg/m2 Hysterectomy Never Smoker Vitals History BMI and BSA Data Body Mass Index Body Surface Area 38.55 kg/m 2 2.15 m 2 Preferred Pharmacy Pharmacy Name Phone [facility] [address] [phone] Your Updated Medication List  
  
   
This list is accurate as of 2/22/18  9:55 AM.  Always use your most recent med list.  
  
  
  
  
 albuterol 90 mcg/actuation inhaler Commonly known as:  PROVENTIL HFA, VENTOLIN HFA, PROAIR HFA Take 2 Puffs by inhalation every six (6) hours as needed for Wheezing. AYR SALINE 0.65 % nasal squeeze bottle Generic drug:  sodium chloride 1 Spray as needed for Congestion. budesonide-formoterol 80-4.5 mcg/actuation Hfaa Commonly known as:  SYMBICORT Take 2 Puffs by inhalation two (2) times a day. Use for 2 weeks at a time. CALCIUM 600 + D 600-125 mg-unit Tab Generic drug:  calcium-cholecalciferol (d3) Take 600 mg by mouth daily. COMBIGAN 0.2-0.5 % Drop ophthalmic solution Generic drug:  brimonidine-timolol Administer 1 Drop to left eye daily. folic acid 1 mg tablet Commonly known as:  Ra Take  by mouth daily. GAVISCON  mg/15 mL suspension Generic drug:  aluminum hydrox-magnesium carb Take 15 mL by mouth three (3) times daily. Peak Flow Meter Bridgette  
1 Units by Does Not Apply route daily. PriLOSEC 20 mg capsule Generic drug:  omeprazole Take 20 mg by mouth daily. rivaroxaban 20 mg Tab tablet Commonly known as:  Arlinda Kanner Take 1 Tab by mouth daily (with breakfast). STOOL SOFTENER 100 mg capsule Generic drug:  docusate sodium Take 100 mg by mouth daily. tiZANidine 2 mg tablet Commonly known as:  Gilberto Rumble Take 1 tablet 3 times a day as needed TYLENOL EXTRA STRENGTH 500 mg tablet Generic drug:  acetaminophen Take 500 mg by mouth every four (4) hours as needed for Pain. * valsartan-hydroCHLOROthiazide 80-12.5 mg per tablet Commonly known as:  DIOVAN-HCT Take 1 Tab by mouth two (2) times a day. * valsartan-hydroCHLOROthiazide 160-12.5 mg per tablet Commonly known as:  DIOVAN-HCT Take 1 Tab by mouth daily. * varicella-zoster recombinant (PF) 50 mcg/0.5 mL Susr injection Commonly known as:  SHINGRIX (PF)  
0.5 mL by IntraMUSCular route once for 1 dose. To be done 2-6 months after initial immunization. * varicella-zoster recombinant (PF) 50 mcg/0.5 mL Susr injection Commonly known as:  SHINGRIX (PF)  
0.5 mL by IntraMUSCular route once for 1 dose. ZANTAC 150 mg tablet Generic drug:  raNITIdine Take  by mouth as needed for Indigestion. ZyrTEC 10 mg Cap Generic drug:  Cetirizine Take 10 mg by mouth as needed. * Notice: This list has 4 medication(s) that are the same as other medications prescribed for you. Read the directions carefully, and ask your doctor or other care provider to review them with you. Prescriptions Printed Refills  
 varicella-zoster recombinant, PF, (SHINGRIX, PF,) 50 mcg/0.5 mL susr injection 0 Si.5 mL by IntraMUSCular route once for 1 dose. To be done 2-6 months after initial immunization. Class: Print Route: IntraMUSCular  
 varicella-zoster recombinant, PF, (SHINGRIX, PF,) 50 mcg/0.5 mL susr injection 0 Si.5 mL by IntraMUSCular route once for 1 dose. Class: Print Route: IntraMUSCular Prescriptions Sent to Pharmacy Refills  
 valsartan-hydroCHLOROthiazide (DIOVAN-HCT) 160-12.5 mg per tablet 3 Sig: Take 1 Tab by mouth daily. Class: Normal  
 Pharmacy: [id number] [place] [address] Ph #: [phone] Route: Oral  
  
Follow-up Instructions Return in about 4 months (around 6/22/2018) for with labs prior. Catherine Richardson To-Do List   
 02/22/2018 Lab:  HEMOGLOBIN A1C WITH EAG Patient Instructions DASH Diet: Care Instructions Your Care Instructions The DASH diet is an eating plan that can help lower your blood pressure. DASH stands for Dietary Approaches to Stop Hypertension. Hypertension is high blood pressure. The DASH diet focuses on eating foods that are high in calcium, potassium, and magnesium. These nutrients can lower blood pressure. The foods that are highest in these nutrients are fruits, vegetables, low-fat dairy products, nuts, seeds, and legumes. But taking calcium, potassium, and magnesium supplements instead of eating foods that are high in those nutrients does not have the same effect. The DASH diet also includes whole grains, fish, and poultry. The DASH diet is one of several lifestyle changes your doctor may recommend to lower your high blood pressure. Your doctor may also want you to decrease the amount of sodium in your diet. Lowering sodium while following the DASH diet can lower blood pressure even further than just the DASH diet alone. Follow-up care is a key part of your treatment and safety. Be sure to make and go to all appointments, and call your doctor if you are having problems. It's also a good idea to know your test results and keep a list of the medicines you take. How can you care for yourself at home? Following the DASH diet · Eat 4 to 5 servings of fruit each day. A serving is 1 medium-sized piece of fruit, ½ cup chopped or canned fruit, 1/4 cup dried fruit, or 4 ounces (½ cup) of fruit juice. Choose fruit more often than fruit juice. · Eat 4 to 5 servings of vegetables each day. A serving is 1 cup of lettuce or raw leafy vegetables, ½ cup of chopped or cooked vegetables, or 4 ounces (½ cup) of vegetable juice.  Choose vegetables more often than vegetable juice. · Get 2 to 3 servings of low-fat and fat-free dairy each day. A serving is 8 ounces of milk, 1 cup of yogurt, or 1 ½ ounces of cheese. · Eat 6 to 8 servings of grains each day. A serving is 1 slice of bread, 1 ounce of dry cereal, or ½ cup of cooked rice, pasta, or cooked cereal. Try to choose whole-grain products as much as possible. · Limit lean meat, poultry, and fish to 2 servings each day. A serving is 3 ounces, about the size of a deck of cards. · Eat 4 to 5 servings of nuts, seeds, and legumes (cooked dried beans, lentils, and split peas) each week. A serving is 1/3 cup of nuts, 2 tablespoons of seeds, or ½ cup of cooked beans or peas. · Limit fats and oils to 2 to 3 servings each day. A serving is 1 teaspoon of vegetable oil or 2 tablespoons of salad dressing. · Limit sweets and added sugars to 5 servings or less a week. A serving is 1 tablespoon jelly or jam, ½ cup sorbet, or 1 cup of lemonade. · Eat less than 2,300 milligrams (mg) of sodium a day. If you limit your sodium to 1,500 mg a day, you can lower your blood pressure even more. Tips for success · Start small. Do not try to make dramatic changes to your diet all at once. You might feel that you are missing out on your favorite foods and then be more likely to not follow the plan. Make small changes, and stick with them. Once those changes become habit, add a few more changes. · Try some of the following: ¨ Make it a goal to eat a fruit or vegetable at every meal and at snacks. This will make it easy to get the recommended amount of fruits and vegetables each day. ¨ Try yogurt topped with fruit and nuts for a snack or healthy dessert. ¨ Add lettuce, tomato, cucumber, and onion to sandwiches. ¨ Combine a ready-made pizza crust with low-fat mozzarella cheese and lots of vegetable toppings. Try using tomatoes, squash, spinach, broccoli, carrots, cauliflower, and onions. ¨ Have a variety of cut-up vegetables with a low-fat dip as an appetizer instead of chips and dip. ¨ Sprinkle sunflower seeds or chopped almonds over salads. Or try adding chopped walnuts or almonds to cooked vegetables. ¨ Try some vegetarian meals using beans and peas. Add garbanzo or kidney beans to salads. Make burritos and tacos with mashed guzman beans or black beans. Where can you learn more? Go to http://diego-beatriz.info/. Enter O925 in the search box to learn more about \"DASH Diet: Care Instructions. \" Current as of: September 21, 2016 Content Version: 11.4 © 0697-0331 Tellja. Care instructions adapted under license by [employer] (which disclaims liability or warranty for this information). If you have questions about a medical condition or this instruction, always ask your healthcare professional. [place] [address] any warranty or liability for your use of this information. Introducing [facility] HE[facility] SERVICES! Dear Edgar Swenson: Thank you for requesting a [address] account. Our records indicate that you already have an active [address] account. You can access your account anytime at [email or web address] Did you know that you can access your hospital and ER discharge instructions at any time in [address]? You can also review all of your test results from your hospital stay or ER visit. Additional Information If you have questions, please visit the Frequently Asked Questions section of the [address] website at [email or web address] Remember, [address] is NOT to be used for urgent needs. For medical emergencies, dial 911. Now available from your iPhone and Android! Please provide this summary of care documentation to your next provider. Your primary care clinician is listed as JCARLOS Javier. If you have any questions after today's visit, please call [phone].

## 2018-06-07 ENCOUNTER — HOSPITAL ENCOUNTER (OUTPATIENT)
Dept: LAB | Age: 68
Discharge: HOME | End: 2018-06-07
Payer: MEDICARE

## 2018-06-07 DIAGNOSIS — R73.9 ELEVATED BLOOD SUGAR: ICD-10-CM

## 2018-06-07 LAB
EST. AVERAGE GLUCOSE BLD GHB EST-MCNC: NORMAL MG/DL
HBA1C MFR BLD: 4.6 % (ref 4.2–5.6)

## 2018-06-07 PROCEDURE — 36415 COLL VENOUS BLD VENIPUNCTURE: CPT | Performed by: INTERNAL MEDICINE

## 2018-06-07 PROCEDURE — 83036 HEMOGLOBIN GLYCOSYLATED A1C: CPT | Performed by: INTERNAL MEDICINE

## 2018-06-14 ENCOUNTER — OFFICE VISIT (OUTPATIENT)
Dept: FAMILY MEDICINE CLINIC | Age: 68
End: 2018-06-14

## 2018-06-14 VITALS
RESPIRATION RATE: 16 BRPM | BODY MASS INDEX: 37.56 KG/M2 | HEART RATE: 76 BPM | WEIGHT: 220 LBS | DIASTOLIC BLOOD PRESSURE: 83 MMHG | SYSTOLIC BLOOD PRESSURE: 119 MMHG | TEMPERATURE: 45.5 F | HEIGHT: 64 IN

## 2018-06-14 DIAGNOSIS — E66.9 OBESITY (BMI 30-39.9): ICD-10-CM

## 2018-06-14 DIAGNOSIS — D86.9 SARCOIDOSIS: ICD-10-CM

## 2018-06-14 DIAGNOSIS — Z86.718 PERSONAL HISTORY OF DVT (DEEP VEIN THROMBOSIS): ICD-10-CM

## 2018-06-14 DIAGNOSIS — Z86.2 HISTORY OF SARCOIDOSIS: ICD-10-CM

## 2018-06-14 DIAGNOSIS — E66.01 SEVERE OBESITY (BMI 35.0-39.9): Primary | ICD-10-CM

## 2018-06-14 RX ORDER — ALBUTEROL SULFATE 90 UG/1
2 AEROSOL, METERED RESPIRATORY (INHALATION)
Qty: 1 INHALER | Refills: 2 | Status: SHIPPED | OUTPATIENT
Start: 2018-06-14 | End: 2020-03-25

## 2018-06-14 RX ORDER — BUDESONIDE AND FORMOTEROL FUMARATE DIHYDRATE 80; 4.5 UG/1; UG/1
2 AEROSOL RESPIRATORY (INHALATION) 2 TIMES DAILY
Qty: 1 INHALER | Refills: 2 | Status: SHIPPED | OUTPATIENT
Start: 2018-06-14 | End: 2020-09-25

## 2018-06-14 NOTE — PROGRESS NOTES
1. Have you been to the ER, urgent care clinic since your last visit? Hospitalized since your last visit? No.     2. Have you seen or consulted any other health care providers outside of the [address] [place] [address] since your last visit? Include any pap smears or colon screening. Yes, saw Dr. Ford Dandy ([facility] [place] [facility] in 5/2018.      Chief Complaint   Patient presents with    Follow Up Chronic Condition    Hypertension    Blood sugar problem    Obesity    Results     labs

## 2018-06-14 NOTE — MR AVS SNAPSHOT
Keren Flanagan [place] [id number] [address] [facility] [address] [address] 
[phone] Patient: Marcelo Manriquez MRN: [id number] NAX:1[birth year]40 Visit Information Date & Time Provider Department Dept. Phone Encounter #  
 6/14/2018  8:15 AM Low Macias, [address] [facility] [phone] [id number] Follow-up Instructions Return in about 4 months (around 10/14/2018) for 30 minute slot. Your Appointments 11/20/2018  1:30 PM  
Office Visit with Low Macias MD  
[address]) Appt Note: 295 [facility] [address] [facility] [address] 500 [facility] [address]  
  
   
 [address] Upcoming Health Maintenance Date Due DTaP/Tdap/Td series (1 - Tdap) 7/3/1973 Influenza Age 5 to Adult 8/1/2018 MEDICARE YEARLY EXAM 11/22/2018 GLAUCOMA SCREENING Q2Y 6/13/2019 BREAST CANCER SCRN MAMMOGRAM 11/16/2019 Pneumococcal 65+ High/Highest Risk (2 of 2 - PPSV23) 11/17/2020 COLONOSCOPY 11/4/2024 Allergies as of 6/14/2018  Review Complete On: 6/14/2018 By: Low Macias MD  
  
 Severity Noted Reaction Type Reactions Penicillin G  09/08/2016    Hives Current Immunizations  Reviewed on 6/14/2018 Name Date Influenza High Dose Vaccine PF 10/12/2017  1:44 PM  
 Influenza Vaccine (Quad) PF 9/29/2016 Pneumococcal Conjugate (PCV-13) 11/21/2017 Pneumococcal Polysaccharide (PPSV-23) 11/17/2015 Zoster Vaccine, Live 6/11/2013 Reviewed by Low Macias MD on 6/14/2018 at  8:54 AM  
 Reviewed by Low Macias MD on 6/14/2018 at  8:54 AM  
You Were Diagnosed With   
  
 Codes Comments Severe obesity (BMI 35.0-39.9) (HCC)    -  Primary ICD-10-CM: E66.01 
ICD-9-CM: 278.01 History of sarcoidosis     ICD-10-CM: Z86.2 ICD-9-CM: V12.29 Personal history of DVT (deep vein thrombosis)     ICD-10-CM: N66.188 ICD-9-CM: V12.51   
 Obesity (BMI 30-39. 9)     ICD-10-CM: E66.9 ICD-9-CM: 278.00 Vitals BP Pulse Temp Resp Height(growth percentile) Weight(growth percentile) 119/83 76 (!) 45.5 °F (7.5 °C) (Oral) 16 5' 4\" (1.626 m) 220 lb (99.8 kg) BMI OB Status Smoking Status 37.76 kg/m2 Hysterectomy Never Smoker Vitals History BMI and BSA Data Body Mass Index Body Surface Area  
 37.76 kg/m 2 2.12 m 2 Preferred Pharmacy Pharmacy Name Phone [facility], [address] [place] [address] Drive [phone] Your Updated Medication List  
  
   
This list is accurate as of 6/14/18  9:00 AM.  Always use your most recent med list.  
  
  
  
  
 albuterol 90 mcg/actuation inhaler Commonly known as:  PROVENTIL HFA, VENTOLIN HFA, PROAIR HFA Take 2 Puffs by inhalation every six (6) hours as needed for Wheezing. AYR SALINE 0.65 % nasal squeeze bottle Generic drug:  sodium chloride 1 Spray as needed for Congestion. budesonide-formoterol 80-4.5 mcg/actuation Hfaa Commonly known as:  SYMBICORT Take 2 Puffs by inhalation two (2) times a day. Use for 2 weeks at a time. CALCIUM 600 + D 600-125 mg-unit Tab Generic drug:  calcium-cholecalciferol (d3) Take 600 mg by mouth daily. COMBIGAN 0.2-0.5 % Drop ophthalmic solution Generic drug:  brimonidine-timolol Administer 1 Drop to left eye daily. folic acid 1 mg tablet Commonly known as:  Google Take  by mouth daily. GAVISCON  mg/15 mL suspension Generic drug:  aluminum hydrox-magnesium carb Take 15 mL by mouth three (3) times daily. Peak Flow Meter Bridgette  
1 Units by Does Not Apply route daily. PriLOSEC 20 mg capsule Generic drug:  omeprazole Take 20 mg by mouth daily. rivaroxaban 20 mg Tab tablet Commonly known as:  Eura Dame Take 1 Tab by mouth daily (with breakfast). STOOL SOFTENER 100 mg capsule Generic drug:  docusate sodium Take 100 mg by mouth daily. tiZANidine 2 mg tablet Commonly known as:  Jones Nguyen Take 1 tablet 3 times a day as needed TYLENOL EXTRA STRENGTH 500 mg tablet Generic drug:  acetaminophen Take 500 mg by mouth every four (4) hours as needed for Pain.  
  
 valsartan-hydroCHLOROthiazide 160-12.5 mg per tablet Commonly known as:  DIOVAN-HCT Take 1 Tab by mouth daily. ZANTAC 150 mg tablet Generic drug:  raNITIdine Take  by mouth as needed for Indigestion. ZyrTEC 10 mg Cap Generic drug:  Cetirizine Take 10 mg by mouth as needed. Prescriptions Sent to Pharmacy Refills  
 budesonide-formoterol (SYMBICORT) 80-4.5 mcg/actuation HFAA 2 Sig: Take 2 Puffs by inhalation two (2) times a day. Use for 2 weeks at a time. Class: Normal  
 Pharmacy: [id number] [place] [address] #: [phone] Route: Inhalation Follow-up Instructions Return in about 4 months (around 10/14/2018) for 30 minute slot. Patient Instructions High-Fiber Diet: Care Instructions Your Care Instructions A high-fiber diet may help you relieve constipation and feel less bloated. Your doctor and dietitian will help you make a high-fiber eating plan based on your personal needs. The plan will include the things you like to eat. It will also make sure that you get 30 grams of fiber a day. Before you make changes to the way you eat, be sure to talk with your doctor or dietitian. Follow-up care is a key part of your treatment and safety. Be sure to make and go to all appointments, and call your doctor if you are having problems. It's also a good idea to know your test results and keep a list of the medicines you take. How can you care for yourself at home? · You can increase how much fiber you get if you eat more of certain foods. These foods include: ¨ Whole-grain breads and cereals. ¨ Fruits, such as pears, apples, and peaches. Eat the skins, peels, and seeds, if you can. ¨ Vegetables, such as broccoli, cabbage, spinach, carrots, asparagus, and squash. ¨ Starchy vegetables. These include potatoes with skins, kidney beans, and lima beans. · Take a fiber supplement every day if your doctor recommends it. Examples are Benefiber, Citrucel, FiberCon, and Metamucil. Ask your doctor how much to take. · Drink plenty of fluids, enough so that your urine is light yellow or clear like water. If you have kidney, heart, or liver disease and have to limit fluids, talk with your doctor before you increase the amount of fluids you drink. · Get some exercise every day. Exercise helps stool move through the colon. It also helps prevent constipation. · Keep a food diary. Try to notice and write down what foods cause gas, pain, or other symptoms. Then you can avoid these foods. Where can you learn more? Go to http://diego-beatriz.info/. Enter I983 in the search box to learn more about \"High-Fiber Diet: Care Instructions. \" Current as of: May 12, 2017 Content Version: 11.4 © 1028-1320 [employer]ada [employer]. Care instructions adapted under license by [employer] (which disclaims liability or warranty for this information). If you have questions about a medical condition or this instruction, always ask your healthcare professional. [place] [address] any warranty or liability for your use of this information. Introducing [facility]! Dear Lalo Pineda: Thank you for requesting a KeVita account. Our records indicate that you already have an active [employer]Vita account. You can access your account anytime at [email or web address] Did you know that you can access your hospital and ER discharge instructions at any time in KeVita? You can also review all of your test results from your hospital stay or ER visit. Additional Information If you have questions, please visit the Frequently Asked Questions section of the Navajo Systemshart website at [email or web address] Remember, wishkicker is NOT to be used for urgent needs. For medical emergencies, dial 911. Now available from your iPhone and Android! Please provide this summary of care documentation to your next provider. Your primary care clinician is listed as JCARLOS Javier. If you have any questions after today's visit, please call [phone].

## 2018-06-14 NOTE — PATIENT INSTRUCTIONS
High-Fiber Diet: Care Instructions  Your Care Instructions    A high-fiber diet may help you relieve constipation and feel less bloated. Your doctor and dietitian will help you make a high-fiber eating plan based on your personal needs. The plan will include the things you like to eat. It will also make sure that you get 30 grams of fiber a day. Before you make changes to the way you eat, be sure to talk with your doctor or dietitian. Follow-up care is a key part of your treatment and safety. Be sure to make and go to all appointments, and call your doctor if you are having problems. It's also a good idea to know your test results and keep a list of the medicines you take. How can you care for yourself at home? · You can increase how much fiber you get if you eat more of certain foods. These foods include:  ¨ Whole-grain breads and cereals. ¨ Fruits, such as pears, apples, and peaches. Eat the skins, peels, and seeds, if you can. ¨ Vegetables, such as broccoli, cabbage, spinach, carrots, asparagus, and squash. ¨ Starchy vegetables. These include potatoes with skins, kidney beans, and lima beans. · Take a fiber supplement every day if your doctor recommends it. Examples are Benefiber, Citrucel, FiberCon, and Metamucil. Ask your doctor how much to take. · Drink plenty of fluids, enough so that your urine is light yellow or clear like water. If you have kidney, heart, or liver disease and have to limit fluids, talk with your doctor before you increase the amount of fluids you drink. · Get some exercise every day. Exercise helps stool move through the colon. It also helps prevent constipation. · Keep a food diary. Try to notice and write down what foods cause gas, pain, or other symptoms. Then you can avoid these foods. Where can you learn more? Go to http://diego-beatriz.info/. Enter N415 in the search box to learn more about \"High-Fiber Diet: Care Instructions. \"  Current as of: May 12, 2017  Content Version: 11.4  © 1695-9740 [employer]wise[employer]. Care instructions adapted under license by [employer] (which disclaims liability or warranty for this information). If you have questions about a medical condition or this instruction, always ask your healthcare professional. [address] any warranty or liability for your use of this information.

## 2018-06-14 NOTE — PROGRESS NOTES
Simeon Penny is a 74 y.o. female and presents with Follow Up Chronic Condition; Hypertension; Blood sugar problem; Obesity; and Results (labs)       Subjective:    Obesity- lost 4.5 lbs- watching calories- cut back on chips. htn- taking increased valsartan dose. Sarcoidosis- stable- no increased sob. H/o DVT- no LE pain or swelling- taking xarelto. No melena or hematochezia. Assessment/Plan:    Obesity- continue wt loss.   htn- controlled- no changes. Sarcoidosis- stable- no changes. Keep pulmonary f/u. H/o DVT- lifetime anticoagulation. HM- getting second shingrix done. Osteopenia- normal DEXA 10/2016- T score -1.0    RTC in 4 months (order DEXA and vit D next visit)    No orders of the defined types were placed in this encounter. Diagnoses and all orders for this visit:    1. Severe obesity (BMI 35.0-39.9) ([address]    2. History of sarcoidosis    3. Personal history of DVT (deep vein thrombosis)    4. Obesity (BMI 30-39.9)    5. Sarcoidosis  -     albuterol (PROVENTIL HFA, VENTOLIN HFA, PROAIR HFA) 90 mcg/actuation inhaler; Take 2 Puffs by inhalation every six (6) hours as needed for Wheezing. Other orders  -     budesonide-formoterol (SYMBICORT) 80-4.5 mcg/actuation HFAA; Take 2 Puffs by inhalation two (2) times a day. Use for 2 weeks at a time. ROS:  Negative except as mentioned above  Cardiac- no chest pain or palpitations  Pulmonary- no sob or wheezes  GI- no n/v or diarrhea. SH:  Social History   Substance Use Topics    Smoking status: Never Smoker    Smokeless tobacco: Never Used    Alcohol use No         Medications/Allergies:  Current Outpatient Prescriptions on File Prior to Visit   Medication Sig Dispense Refill    valsartan-hydroCHLOROthiazide (DIOVAN-HCT) 160-12.5 mg per tablet Take 1 Tab by mouth daily. 90 Tab 3    rivaroxaban (XARELTO) 20 mg tab tablet Take 1 Tab by mouth daily (with breakfast).  90 Tab 3    omeprazole (PRILOSEC) 20 mg capsule Take 20 mg by mouth daily.  aluminum hydrox-magnesium carb (GAVISCON)  mg/15 mL suspension Take 15 mL by mouth three (3) times daily.  budesonide-formoterol (SYMBICORT) 80-4.5 mcg/actuation HFAA inhaler Take 2 Puffs by inhalation two (2) times a day. Use for 2 weeks at a time. 1 Inhaler 1    Peak Flow Meter lanette 1 Units by Does Not Apply route daily. 1 Device 0    albuterol (PROVENTIL HFA, VENTOLIN HFA, PROAIR HFA) 90 mcg/actuation inhaler Take 2 Puffs by inhalation every six (6) hours as needed for Wheezing. 1 Inhaler 2    acetaminophen (TYLENOL EXTRA STRENGTH) 500 mg tablet Take 500 mg by mouth every four (4) hours as needed for Pain.  folic acid (FOLVITE) 1 mg tablet Take  by mouth daily.  brimonidine-timolol (COMBIGAN) 0.2-0.5 % drop ophthalmic solution Administer 1 Drop to left eye daily.  ranitidine (ZANTAC) 150 mg tablet Take  by mouth as needed for Indigestion.  Cetirizine (ZYRTEC) 10 mg cap Take 10 mg by mouth as needed.  calcium-cholecalciferol, d3, (CALCIUM 600 + D) 600-125 mg-unit tab Take 600 mg by mouth daily.  docusate sodium (STOOL SOFTENER) 100 mg capsule Take 100 mg by mouth daily.  sodium chloride (AYR SALINE) 0.65 % nasal spray 1 Spray as needed for Congestion.  tiZANidine (ZANAFLEX) 2 mg tablet Take 1 tablet 3 times a day as needed 30 Tab 0     No current facility-administered medications on file prior to visit. Allergies   Allergen Reactions    Penicillin G Hives       Objective:  Visit Vitals    /83    Pulse 76    Temp (!) 45.5 °F (7.5 °C) (Oral)    Resp 16    Ht 5' 4\" (1.626 m)    Wt 220 lb (99.8 kg)    BMI 37.76 kg/m2    Body mass index is 37.76 kg/(m^2). Constitutional: Well developed, nourished, no distress, alert   CV: S1, S2.  RRR. No murmurs/rubs. No thrills palpated. No carotid bruits. Intact distal pulses. No edema. Pulm: No abnormalities on inspection. Clear to auscultation bilaterally. No wheezing/rhonchi. Normal effort. GI: Soft, nontender, nondistended. Normal active bowel sounds. No  masses on palpation. No hepatosplenomegaly.

## 2018-07-09 ENCOUNTER — OFFICE VISIT (OUTPATIENT)
Dept: FAMILY MEDICINE CLINIC | Age: 68
End: 2018-07-09

## 2018-07-09 VITALS
HEART RATE: 82 BPM | WEIGHT: 222.6 LBS | SYSTOLIC BLOOD PRESSURE: 140 MMHG | RESPIRATION RATE: 18 BRPM | BODY MASS INDEX: 38 KG/M2 | TEMPERATURE: 97.8 F | HEIGHT: 64 IN | OXYGEN SATURATION: 99 % | DIASTOLIC BLOOD PRESSURE: 88 MMHG

## 2018-07-09 DIAGNOSIS — M54.32 SCIATICA OF LEFT SIDE: Primary | ICD-10-CM

## 2018-07-09 NOTE — PROGRESS NOTES
Marlon Hanson is a 79 y.o. female and presents with Leg Pain (left sharp pain ); Knee Pain (left sharp pain ); and Wheezing (observed wheezing during office visit )       Subjective:    Left side  Pain from low back to foot. Pain is \"sharp, hard, pinching\" pain. Had sciatica in past. Started about 2 weeks ago- using tylenol without much help. Worse with walking. No trauma or inciting event that she can recall. Had been on a long trip just prior to worsening. No incontinence. Assessment/Plan:      Sciatica- discussed in detail - will try exercises and pt will monitor for new sx. If not  Improving,she will return. - consider PT also. RTC in   No orders of the defined types were placed in this encounter. MR LUMBAR SPINE W/O CONTRAST5/19/2015  [facility]  Result Impression   Impression:    1. Mild/moderate degenerative changes in the lower lumbar spine, detailed above. Moderate to severe facet arthropathy at L5-S1. 2.  1.3 cm complex lesion in the lower pole the left kidney. Further evaluation with ultrasound kidney is recommended to exclude malignancy. Thank you for this referral.    Result Narrative   MR LUMBAR SPINE WITHOUT CONTRAST    HISTORY: Back pain, right radiculopathy    COMPARISON: CT abdomen pelvis April 18, 2008    TECHNIQUE:Sagittal T1, FSE T2, STIR, Axial T1, FSE T2 images were performed through the lumbar spine. FINDINGS:   The vertebral body height and alignment are within normal limits. Probable hemangioma in the posterior left L5 vertebral body. Schmorl's node formation at L4-L5. The conus medullaris appears normal, terminating at L2. Mild degenerative discogenic disease L3-L4 through L5-S1. Partially imaged left kidney demonstrates multiple 4-5 mm T2 hyperintensities, these likely represent cysts. 1.3 cm complex lesion at the lower pole the left kidney. L1/L2: No significant central  stenosis or neural foraminal narrowing.     L2/L3: Mild disc bulge with prominence in the left foraminal region. Mild facet and ligamentous hypertrophy. Mild left foraminal stenosis. No significant central  stenosis . L3/L4: Mild disc bulge. Mild facet and ligamentous hypertrophy. Mild foraminal stenosis. Mild narrowing of the central canal.    L4/L5: Diffuse disc bulge. Moderate facet and ligamentous hypertrophy. Mild foraminal stenosis. Mild central canal stenosis. L5/S1: Diffuse disc bulge eccentric to the left. Moderate to severe facet and ligamentous hypertrophy. Central canal is patent. Mild left foraminal stenosis. ROS:  Negative except as mentioned above  Cardiac- no chest pain or palpitations  Pulmonary- no sob or wheezes  GI- no n/v or diarrhea. SH:  Social History   Substance Use Topics    Smoking status: Never Smoker    Smokeless tobacco: Never Used    Alcohol use No         Medications/Allergies:  Current Outpatient Prescriptions on File Prior to Visit   Medication Sig Dispense Refill    albuterol (PROVENTIL HFA, VENTOLIN HFA, PROAIR HFA) 90 mcg/actuation inhaler Take 2 Puffs by inhalation every six (6) hours as needed for Wheezing. 1 Inhaler 2    valsartan-hydroCHLOROthiazide (DIOVAN-HCT) 160-12.5 mg per tablet Take 1 Tab by mouth daily. 90 Tab 3    rivaroxaban (XARELTO) 20 mg tab tablet Take 1 Tab by mouth daily (with breakfast). 90 Tab 3    omeprazole (PRILOSEC) 20 mg capsule Take 20 mg by mouth daily.  aluminum hydrox-magnesium carb (GAVISCON)  mg/15 mL suspension Take 15 mL by mouth three (3) times daily.  Peak Flow Meter lanette 1 Units by Does Not Apply route daily. 1 Device 0    acetaminophen (TYLENOL EXTRA STRENGTH) 500 mg tablet Take 500 mg by mouth every four (4) hours as needed for Pain.  folic acid (FOLVITE) 1 mg tablet Take  by mouth daily.  brimonidine-timolol (COMBIGAN) 0.2-0.5 % drop ophthalmic solution Administer 1 Drop to left eye daily.  Cetirizine (ZYRTEC) 10 mg cap Take 10 mg by mouth as needed.       calcium-cholecalciferol, d3, (CALCIUM 600 + D) 600-125 mg-unit tab Take 600 mg by mouth daily.  docusate sodium (STOOL SOFTENER) 100 mg capsule Take 100 mg by mouth daily.  sodium chloride (AYR SALINE) 0.65 % nasal spray 1 Spray as needed for Congestion.  budesonide-formoterol (SYMBICORT) 80-4.5 mcg/actuation HFAA Take 2 Puffs by inhalation two (2) times a day. Use for 2 weeks at a time. 1 Inhaler 2    tiZANidine (ZANAFLEX) 2 mg tablet Take 1 tablet 3 times a day as needed 30 Tab 0    ranitidine (ZANTAC) 150 mg tablet Take  by mouth as needed for Indigestion. No current facility-administered medications on file prior to visit. Allergies   Allergen Reactions    Penicillin G Hives       Objective:  Visit Vitals    /88 (BP 1 Location: Left arm, BP Patient Position: At rest)    Pulse 82    Temp 97.8 °F (36.6 °C)    Resp 18    Ht 5' 4\" (1.626 m)    Wt 222 lb 9.6 oz (101 kg)    SpO2 99%  Comment: room air ( observed slight wheezing)    BMI 38.21 kg/m2    Body mass index is 38.21 kg/(m^2). Constitutional: Well developed, nourished, no distress, alert   neuro Neg slr, normal gait, normal heel and toe walking, DTR 1+ at achilles and quads bilaterally. Normal sensation to LT in LE.

## 2018-07-09 NOTE — PROGRESS NOTES
1. Have you been to the ER, urgent care clinic since your last visit? Hospitalized since your last visit? No    2. Have you seen or consulted any other health care providers outside of the [facility] since your last visit? Include any pap smears or colon screening.  No     Chief Complaint   Patient presents with    Leg Pain     left sharp pain     Knee Pain     left sharp pain     Wheezing     observed wheezing during office visit

## 2018-07-09 NOTE — PATIENT INSTRUCTIONS
Sciatica: Care Instructions  Your Care Instructions    Sciatica (say \"lyg-NJ-ij-kuh\") is an irritation of one of the sciatic nerves, which come from the spinal cord in the lower back. The sciatic nerves and their branches extend down through the buttock to the foot. Sciatica can develop when an injured disc in the back presses against a spinal nerve root. Its main symptom is pain, numbness, or weakness that is often worse in the leg or foot than in the back. Sciatica often will improve and go away with time. Early treatment usually includes medicines and exercises to relieve pain. Follow-up care is a key part of your treatment and safety. Be sure to make and go to all appointments, and call your doctor if you are having problems. It's also a good idea to know your test results and keep a list of the medicines you take. How can you care for yourself at home? · Take pain medicines exactly as directed. ¨ If the doctor gave you a prescription medicine for pain, take it as prescribed. ¨ If you are not taking a prescription pain medicine, ask your doctor if you can take an over-the-counter medicine. · Use heat or ice to relieve pain. ¨ To apply heat, put a warm water bottle, heating pad set on low, or warm cloth on your back. Do not go to sleep with a heating pad on your skin. ¨ To use ice, put ice or a cold pack on the area for 10 to 20 minutes at a time. Put a thin cloth between the ice and your skin. · Avoid sitting if possible, unless it feels better than standing. · Alternate lying down with short walks. Increase your walking distance as you are able to without making your symptoms worse. · Do not do anything that makes your symptoms worse. When should you call for help? Call 911 anytime you think you may need emergency care. For example, call if:  · You are unable to move a leg at all.   Call your doctor now or seek immediate medical care if:  · You have new or worse symptoms in your legs or buttocks. Symptoms may include:  ¨ Numbness or tingling. ¨ Weakness. ¨ Pain. · You lose bladder or bowel control. Watch closely for changes in your health, and be sure to contact your doctor if:  · You are not getting better as expected. Where can you learn more? Go to http://diego-beatriz.info/. Enter [phone] in the search box to learn more about \"Sciatica: Care Instructions. \"  Current as of: March 21, 2017  Content Version: 11.4  © 3647-8267 [employer]. Care instructions adapted under license by [employer] (which disclaims liability or warranty for this information). If you have questions about a medical condition or this instruction, always ask your healthcare professional. [address] any warranty or liability for your use of this information. Sciatica: Exercises  Your Care Instructions  Here are some examples of typical rehabilitation exercises for your condition. Start each exercise slowly. Ease off the exercise if you start to have pain. Your doctor or physical therapist will tell you when you can start these exercises and which ones will work best for you. When you are not being active, find a comfortable position for rest. Some people are comfortable on the floor or a medium-firm bed with a small pillow under their head and another under their knees. Some people prefer to lie on their side with a pillow between their knees. Don't stay in one position for too long. Take short walks (10 to 20 minutes) every 2 to 3 hours. Avoid slopes, hills, and stairs until you feel better. Walk only distances you can manage without pain, especially leg pain. How to do the exercises  Back stretches    1. Get down on your hands and knees on the floor. 2. Relax your head and allow it to droop. Round your back up toward the ceiling until you feel a nice stretch in your upper, middle, and lower back.  Hold this stretch for as long as it feels comfortable, or about 15 to 30 seconds. 3. Return to the starting position with a flat back while you are on your hands and knees. 4. Let your back sway by pressing your stomach toward the floor. Lift your buttocks toward the ceiling. 5. Hold this position for 15 to 30 seconds. 6. Repeat 2 to 4 times. Follow-up care is a key part of your treatment and safety. Be sure to make and go to all appointments, and call your doctor if you are having problems. It's also a good idea to know your test results and keep a list of the medicines you take. Where can you learn more? Go to http://diego-beatriz.info/. Enter S077 in the search box to learn more about \"Sciatica: Exercises. \"  Current as of: March 21, 2017  Content Version: 11.4  © 6578-4422 [employer]. Care instructions adapted under license by [employer] (which disclaims liability or warranty for this information). If you have questions about a medical condition or this instruction, always ask your healthcare professional. [place] [address] any warranty or liability for your use of this information.

## 2018-07-09 NOTE — MR AVS SNAPSHOT
Keren Lira [place] [address] [facility] [address] [id number] 
[phone] Patient: Antony Ellis MRN: [id number] Visit Information Date & Time Provider Department Dept. Phone Encounter #  
 2018  5:00 PM Kevan Pabon, [address] [place] [address] [phone] [id number] Follow-up Instructions Return if symptoms worsen or fail to improve. Your Appointments 10/18/2018 10:15 AM  
Follow Up with MD Frandy Nicole 23 ([address]) Appt Note: 4 mo f/u  
 [facility] [address]  
  
   
 [address]  
  
    
 2018  1:30 PM  
Office Visit with MD Frandy Nicole 23 [address] [place] [address]) Appt Note: [address] [facility] [address] [facility] [address]  
  
   
 [address] Upcoming Health Maintenance Date Due DTaP/Tdap/Td series (1 - Tdap) 7/3/1971 Influenza Age 5 to Adult 2018 MEDICARE YEARLY EXAM 2018 GLAUCOMA SCREENING Q2Y 2019 BREAST CANCER SCRN MAMMOGRAM 2019 Pneumococcal 65+ High/Highest Risk (2 of 2 - PPSV23) 2020 COLONOSCOPY 2024 Allergies as of 2018  Review Complete On: 2018 By: Kevan Pabon MD  
  
 Severity Noted Reaction Type Reactions Penicillin G  2016    Hives Current Immunizations  Reviewed on 2018 Name Date Influenza High Dose Vaccine PF 10/12/2017  1:44 PM  
 Influenza Vaccine (Quad) PF 2016 Pneumococcal Conjugate (PCV-13) 2017 Pneumococcal Polysaccharide (PPSV-23) 2015 Zoster Vaccine, Live 2013 Not reviewed this visit You Were Diagnosed With   
  
 Codes Comments Sciatica of left side    -  Primary ICD-10-CM: M54.32 
ICD-9-CM: 724.3 Vitals BP Pulse Temp Resp Height(growth percentile) Weight(growth percentile) 140/88 (BP 1 Location: Left arm, BP Patient Position: At rest) 82 97.8 °F (36.6 °C) 18 5' 4\" (1.626 m) 222 lb 9.6 oz (101 kg) SpO2 BMI OB Status Smoking Status 99% 38.21 kg/m2 Hysterectomy Never Smoker Vitals History BMI and BSA Data Body Mass Index Body Surface Area  
 38.21 kg/m 2 2.14 m 2 Preferred Pharmacy Pharmacy Name Phone [facility] [address] [phone] Your Updated Medication List  
  
   
This list is accurate as of 7/9/18  5:13 PM.  Always use your most recent med list.  
  
  
  
  
 albuterol 90 mcg/actuation inhaler Commonly known as:  PROVENTIL HFA, VENTOLIN HFA, PROAIR HFA Take 2 Puffs by inhalation every six (6) hours as needed for Wheezing. AYR SALINE 0.65 % nasal squeeze bottle Generic drug:  sodium chloride 1 Spray as needed for Congestion. budesonide-formoterol 80-4.5 mcg/actuation Hfaa Commonly known as:  SYMBICORT Take 2 Puffs by inhalation two (2) times a day. Use for 2 weeks at a time. CALCIUM 600 + D 600-125 mg-unit Tab Generic drug:  calcium-cholecalciferol (d3) Take 600 mg by mouth daily. COMBIGAN 0.2-0.5 % Drop ophthalmic solution Generic drug:  brimonidine-timolol Administer 1 Drop to left eye daily. folic acid 1 mg tablet Commonly known as:  Google Take  by mouth daily. GAVISCON  mg/15 mL suspension Generic drug:  aluminum hydrox-magnesium carb Take 15 mL by mouth three (3) times daily. Peak Flow Meter Bridgette  
1 Units by Does Not Apply route daily. PriLOSEC 20 mg capsule Generic drug:  omeprazole Take 20 mg by mouth daily. rivaroxaban 20 mg Tab tablet Commonly known as:  Jhon Safe Take 1 Tab by mouth daily (with breakfast). STOOL SOFTENER 100 mg capsule Generic drug:  docusate sodium Take 100 mg by mouth daily. tiZANidine 2 mg tablet Commonly known as:  Clearance Suraj Take 1 tablet 3 times a day as needed TYLENOL EXTRA STRENGTH 500 mg tablet Generic drug:  acetaminophen Take 500 mg by mouth every four (4) hours as needed for Pain.  
  
 valsartan-hydroCHLOROthiazide 160-12.5 mg per tablet Commonly known as:  DIOVAN-HCT Take 1 Tab by mouth daily. ZANTAC 150 mg tablet Generic drug:  raNITIdine Take  by mouth as needed for Indigestion. ZyrTEC 10 mg Cap Generic drug:  Cetirizine Take 10 mg by mouth as needed. Follow-up Instructions Return if symptoms worsen or fail to improve. Patient Instructions Sciatica: Care Instructions Your Care Instructions Sciatica (say \"vxx-VW-jg-kuh\") is an irritation of one of the sciatic nerves, which come from the spinal cord in the lower back. The sciatic nerves and their branches extend down through the buttock to the foot. Sciatica can develop when an injured disc in the back presses against a spinal nerve root. Its main symptom is pain, numbness, or weakness that is often worse in the leg or foot than in the back. Sciatica often will improve and go away with time. Early treatment usually includes medicines and exercises to relieve pain. Follow-up care is a key part of your treatment and safety. Be sure to make and go to all appointments, and call your doctor if you are having problems. It's also a good idea to know your test results and keep a list of the medicines you take. How can you care for yourself at home? · Take pain medicines exactly as directed. ¨ If the doctor gave you a prescription medicine for pain, take it as prescribed. ¨ If you are not taking a prescription pain medicine, ask your doctor if you can take an over-the-counter medicine. · Use heat or ice to relieve pain.  
¨ To apply heat, put a warm water bottle, heating pad set on low, or warm cloth on your back. Do not go to sleep with a heating pad on your skin. ¨ To use ice, put ice or a cold pack on the area for 10 to 20 minutes at a time. Put a thin cloth between the ice and your skin. · Avoid sitting if possible, unless it feels better than standing. · Alternate lying down with short walks. Increase your walking distance as you are able to without making your symptoms worse. · Do not do anything that makes your symptoms worse. When should you call for help? Call 911 anytime you think you may need emergency care. For example, call if: 
· You are unable to move a leg at all. Call your doctor now or seek immediate medical care if: 
· You have new or worse symptoms in your legs or buttocks. Symptoms may include: ¨ Numbness or tingling. ¨ Weakness. ¨ Pain. · You lose bladder or bowel control. Watch closely for changes in your health, and be sure to contact your doctor if: 
· You are not getting better as expected. Where can you learn more? Go to http://diego-beatriz.info/. Enter [phone] in the search box to learn more about \"Sciatica: Care Instructions. \" Current as of: March 21, 2017 Content Version: 11.4 © 1944-7152 [employer]. Care instructions adapted under license by [employer] (which disclaims liability or warranty for this information). If you have questions about a medical condition or this instruction, always ask your healthcare professional. [place] [address] any warranty or liability for your use of this information. Sciatica: Exercises Your Care Instructions Here are some examples of typical rehabilitation exercises for your condition. Start each exercise slowly. Ease off the exercise if you start to have pain. Your doctor or physical therapist will tell you when you can start these exercises and which ones will work best for you.  
When you are not being active, find a comfortable position for rest. Some people are comfortable on the floor or a medium-firm bed with a small pillow under their head and another under their knees. Some people prefer to lie on their side with a pillow between their knees. Don't stay in one position for too long. Take short walks (10 to 20 minutes) every 2 to 3 hours. Avoid slopes, hills, and stairs until you feel better. Walk only distances you can manage without pain, especially leg pain. How to do the exercises Back stretches 1. Get down on your hands and knees on the floor. 2. Relax your head and allow it to droop. Round your back up toward the ceiling until you feel a nice stretch in your upper, middle, and lower back. Hold this stretch for as long as it feels comfortable, or about 15 to 30 seconds. 3. Return to the starting position with a flat back while you are on your hands and knees. 4. Let your back sway by pressing your stomach toward the floor. Lift your buttocks toward the ceiling. 5. Hold this position for 15 to 30 seconds. 6. Repeat 2 to 4 times. Follow-up care is a key part of your treatment and safety. Be sure to make and go to all appointments, and call your doctor if you are having problems. It's also a good idea to know your test results and keep a list of the medicines you take. Where can you learn more? Go to http://diego-beatriz.info/. Enter R707 in the search box to learn more about \"Sciatica: Exercises. \" Current as of: March 21, 2017 Content Version: 11.4 © 5696-7111 [employer]. Care instructions adapted under license by [employer] (which disclaims liability or warranty for this information). If you have questions about a medical condition or this instruction, always ask your healthcare professional. [place] [address] any warranty or liability for your use of this information. Introducing [facility]! Dear Jacy Lakhani: Thank you for requesting a Naiku account. Our records indicate that you already have an active Naiku account. You can access your account anytime at [email or web address]. [email or web address] Did you know that you can access your hospital and ER discharge instructions at any time in Naiku? You can also review all of your test results from your hospital stay or ER visit. Additional Information If you have questions, please visit the Frequently Asked Questions section of the Naiku website at [email or web address]. [email or web address] Remember, Naiku is NOT to be used for urgent needs. For medical emergencies, dial 911. Now available from your iPhone and Android! Please provide this summary of care documentation to your next provider. Your primary care clinician is listed as JCARLOS Javier. If you have any questions after today's visit, please call [phone].

## 2018-07-18 ENCOUNTER — TELEPHONE (OUTPATIENT)
Dept: FAMILY MEDICINE CLINIC | Age: 68
End: 2018-07-18

## 2018-07-18 NOTE — TELEPHONE ENCOUNTER
Patient called and she stated that her valsartan/hctz has been recalled and wants to know if you can send in a new prescription in replace of that.  thanks

## 2018-07-26 ENCOUNTER — TELEPHONE (OUTPATIENT)
Dept: FAMILY MEDICINE CLINIC | Age: 68
End: 2018-07-26

## 2018-07-26 NOTE — TELEPHONE ENCOUNTER
Pharmacy told pt.to contact dr. Perlita Blanco for a new script for her blood pressure med that has been recalled.

## 2018-07-26 NOTE — TELEPHONE ENCOUNTER
She did spoke to her pharmacist and her valsartan is not one of the  that has been recalled so told her to continue her medications.

## 2018-10-18 ENCOUNTER — HOSPITAL ENCOUNTER (OUTPATIENT)
Dept: LAB | Age: 68
Discharge: HOME | End: 2018-10-18
Payer: MEDICARE

## 2018-10-18 ENCOUNTER — OFFICE VISIT (OUTPATIENT)
Dept: FAMILY MEDICINE CLINIC | Age: 68
End: 2018-10-18

## 2018-10-18 VITALS
WEIGHT: 226 LBS | RESPIRATION RATE: 16 BRPM | DIASTOLIC BLOOD PRESSURE: 76 MMHG | OXYGEN SATURATION: 91 % | SYSTOLIC BLOOD PRESSURE: 168 MMHG | HEART RATE: 110 BPM | TEMPERATURE: 97.2 F | HEIGHT: 64 IN | BODY MASS INDEX: 38.58 KG/M2

## 2018-10-18 DIAGNOSIS — R68.89 COLD INTOLERANCE: ICD-10-CM

## 2018-10-18 DIAGNOSIS — R73.9 ELEVATED BLOOD SUGAR: ICD-10-CM

## 2018-10-18 DIAGNOSIS — Z23 ENCOUNTER FOR IMMUNIZATION: Primary | ICD-10-CM

## 2018-10-18 DIAGNOSIS — E66.01 SEVERE OBESITY WITH BODY MASS INDEX (BMI) OF 35.0 TO 39.9 WITH SERIOUS COMORBIDITY (HCC): ICD-10-CM

## 2018-10-18 DIAGNOSIS — Z86.2 HISTORY OF SARCOIDOSIS: ICD-10-CM

## 2018-10-18 DIAGNOSIS — D57.40 SICKLE CELL BETA THALASSEMIA (HCC): ICD-10-CM

## 2018-10-18 LAB
ALBUMIN SERPL-MCNC: 4 G/DL (ref 3.4–5)
ALBUMIN/GLOB SERPL: 1.1 {RATIO} (ref 0.8–1.7)
ALP SERPL-CCNC: 102 U/L (ref 45–117)
ALT SERPL-CCNC: 18 U/L (ref 13–56)
ANION GAP SERPL CALC-SCNC: 6 MMOL/L (ref 3–18)
AST SERPL-CCNC: 14 U/L (ref 15–37)
BILIRUB SERPL-MCNC: 0.4 MG/DL (ref 0.2–1)
BUN SERPL-MCNC: 18 MG/DL (ref 7–18)
BUN/CREAT SERPL: 15 (ref 12–20)
CALCIUM SERPL-MCNC: 8.9 MG/DL (ref 8.5–10.1)
CHLORIDE SERPL-SCNC: 107 MMOL/L (ref 100–108)
CO2 SERPL-SCNC: 30 MMOL/L (ref 21–32)
CREAT SERPL-MCNC: 1.21 MG/DL (ref 0.6–1.3)
ERYTHROCYTE [DISTWIDTH] IN BLOOD BY AUTOMATED COUNT: 15.9 % (ref 11.6–14.5)
EST. AVERAGE GLUCOSE BLD GHB EST-MCNC: NORMAL MG/DL
GLOBULIN SER CALC-MCNC: 3.5 G/DL (ref 2–4)
GLUCOSE SERPL-MCNC: 98 MG/DL (ref 74–99)
HBA1C MFR BLD: 4.7 % (ref 4.2–5.6)
HCT VFR BLD AUTO: 31.1 % (ref 35–45)
HGB BLD-MCNC: 9.4 G/DL (ref 12–16)
MCH RBC QN AUTO: 21.7 PG (ref 24–34)
MCHC RBC AUTO-ENTMCNC: 30.2 G/DL (ref 31–37)
MCV RBC AUTO: 71.8 FL (ref 74–97)
PLATELET # BLD AUTO: 195 K/UL (ref 135–420)
PMV BLD AUTO: 9.2 FL (ref 9.2–11.8)
POTASSIUM SERPL-SCNC: 4.5 MMOL/L (ref 3.5–5.5)
PROT SERPL-MCNC: 7.5 G/DL (ref 6.4–8.2)
RBC # BLD AUTO: 4.33 M/UL (ref 4.2–5.3)
SODIUM SERPL-SCNC: 143 MMOL/L (ref 136–145)
TSH SERPL DL<=0.05 MIU/L-ACNC: 1.4 UIU/ML (ref 0.36–3.74)
WBC # BLD AUTO: 8.4 K/UL (ref 4.6–13.2)

## 2018-10-18 PROCEDURE — 84443 ASSAY THYROID STIM HORMONE: CPT | Performed by: INTERNAL MEDICINE

## 2018-10-18 PROCEDURE — 85027 COMPLETE CBC AUTOMATED: CPT | Performed by: INTERNAL MEDICINE

## 2018-10-18 PROCEDURE — 80053 COMPREHEN METABOLIC PANEL: CPT | Performed by: INTERNAL MEDICINE

## 2018-10-18 PROCEDURE — 83036 HEMOGLOBIN GLYCOSYLATED A1C: CPT | Performed by: INTERNAL MEDICINE

## 2018-10-18 PROCEDURE — 36415 COLL VENOUS BLD VENIPUNCTURE: CPT | Performed by: INTERNAL MEDICINE

## 2018-10-18 NOTE — PROGRESS NOTES
Get Wang is a 79 y.o. female and presents with Follow Up Chronic Condition; Hypertension; Obesity; Sarcoidosis; Osteopenia; and Other (Cold intolerance and Right Thumb pain) Subjective: 
 
Cold intolerance- was told by  she is lethargic recently as well. Some constipation. H/o sarcoidosis-some increased sob. No increase cough or chest pain. No longer seeing pulmonary 
htn- taking meds. No cp or edema. Elevated blood sugar- no polyuria. Obesity- gained weight- stopped chips but drinking ginger ale. Assessment/Plan:   
Diagnoses and all orders for this visit: 
 
Encounter for immunization 
-     INFLUENZA VACCINE INACTIVATED (IIV), SUBUNIT, ADJUVANTED, IM Cold intolerance- will check labs- no clear etiology on exam. R/o hypothyroid also given some constipation also. -     METABOLIC PANEL, COMPREHENSIVE; Future 
-     TSH 3RD GENERATION; Future -     CBC W/O DIFF; Future [facility]ickle [facility]- recheck cbc 
-     CBC W/O DIFF; Future Severe obesity with body mass index (BMI) of 35.0 to 39.9 with serious comorbidity ([facility]- stressed importance of wt loss with caloric restriction. History of sarcoidosis -     METABOLIC PANEL, COMPREHENSIVE; Future 
-     TSH 3RD GENERATION; Future -     CBC W/O DIFF; Future Elevated blood sugar- normal A1c in past.  
-     HEMOGLOBIN A1C WITH EAG; Future RTC in 1 month. Orders Placed This Encounter  Influenza Vaccine Inactivated (IIV)(FLUAD), Subunit, Adjuvanted, IM, (81001)  METABOLIC PANEL, COMPREHENSIVE Standing Status:   Future Standing Expiration Date:   10/19/2019  
 TSH 3RD GENERATION Standing Status:   Future Standing Expiration Date:   10/19/2019  
 HEMOGLOBIN A1C WITH EAG Standing Status:   Future Standing Expiration Date:   10/19/2019  CBC W/O DIFF Standing Status:   Future Standing Expiration Date:   10/19/2019 ROS: 
Negative except as mentioned above Cardiac- no chest pain or palpitations Pulmonary- no sob or wheezes GI- no n/v or diarrhea. SH: Social History Tobacco Use  Smoking status: Never Smoker  Smokeless tobacco: Never Used Substance Use Topics  Alcohol use: No  
 Drug use: Not on file Medications/Allergies: 
Current Outpatient Medications on File Prior to Visit Medication Sig Dispense Refill  budesonide-formoterol (SYMBICORT) 80-4.5 mcg/actuation HFAA Take 2 Puffs by inhalation two (2) times a day. Use for 2 weeks at a time. 1 Inhaler 2  
 albuterol (PROVENTIL HFA, VENTOLIN HFA, PROAIR HFA) 90 mcg/actuation inhaler Take 2 Puffs by inhalation every six (6) hours as needed for Wheezing. 1 Inhaler 2  
 valsartan-hydroCHLOROthiazide (DIOVAN-HCT) 160-12.5 mg per tablet Take 1 Tab by mouth daily. 90 Tab 3  
 rivaroxaban (XARELTO) 20 mg tab tablet Take 1 Tab by mouth daily (with breakfast). 90 Tab 3  
 omeprazole (PRILOSEC) 20 mg capsule Take 20 mg by mouth daily.  aluminum hydrox-magnesium carb (GAVISCON)  mg/15 mL suspension Take 15 mL by mouth three (3) times daily.  Peak Flow Meter lanette 1 Units by Does Not Apply route daily. 1 Device 0  
 tiZANidine (ZANAFLEX) 2 mg tablet Take 1 tablet 3 times a day as needed 30 Tab 0  
 acetaminophen (TYLENOL EXTRA STRENGTH) 500 mg tablet Take 500 mg by mouth every four (4) hours as needed for Pain.  folic acid (FOLVITE) 1 mg tablet Take  by mouth daily.  ranitidine (ZANTAC) 150 mg tablet Take  by mouth as needed for Indigestion.  Cetirizine (ZYRTEC) 10 mg cap Take 10 mg by mouth as needed.  calcium-cholecalciferol, d3, (CALCIUM 600 + D) 600-125 mg-unit tab Take 600 mg by mouth daily.  docusate sodium (STOOL SOFTENER) 100 mg capsule Take 100 mg by mouth daily.  sodium chloride (AYR SALINE) 0.65 % nasal spray 1 Spray as needed for Congestion.     
 brimonidine-timolol (COMBIGAN) 0.2-0.5 % drop ophthalmic solution Administer 1 Drop to left eye daily. No current facility-administered medications on file prior to visit. Allergies Allergen Reactions  Penicillin G Hives Objective: 
Visit Vitals /76 Pulse 87 Temp 97.2 °F (36.2 °C) (Oral) Resp 16 Ht 5' 4\" (1.626 m) Wt 226 lb (102.5 kg) BMI 38.79 kg/m² Body mass index is 38.79 kg/m². Constitutional: Well developed, nourished, no distress, alert HENT: Exterior ears and tympanic membranes normal bilaterally. Supple neck. No thyromegaly or lymphadenopathy. Oropharynx clear and moist mucous membranes. Eyes: Conjunctiva normal. PERRL. CV: S1, S2.  RRR. No murmurs/rubs. No thrills palpated. No carotid bruits. Intact distal pulses. No edema. Pulm: No abnormalities on inspection. Clear to auscultation bilaterally. No wheezing/rhonchi. Normal effort. GI: Soft, nontender, nondistended. Normal active bowel sounds. No  masses on palpation. No hepatosplenomegaly.

## 2018-10-18 NOTE — PROGRESS NOTES
1. Have you been to the ER, urgent care clinic since your last visit? Hospitalized since your last visit? 2. Have you seen or consulted any other health care providers outside of the [address] [place] [address] since your last visit? Include any pap smears or colon screening. Chief Complaint Patient presents with  Follow Up Chronic Condition  Hypertension  Obesity  Sarcoidosis  Osteopenia  Other  
  h/o osteopenia

## 2018-10-18 NOTE — PATIENT INSTRUCTIONS
Vaccine Information Statement Influenza (Flu) Vaccine (Inactivated or Recombinant): What you need to know Many Vaccine Information Statements are available in Urdu and other languages. See www.immunize.org/vis Hojas de Información Sobre Vacunas están disponibles en Español y en muchos otros idiomas. Visite www.immunize.org/vis 1. Why get vaccinated? Influenza (flu) is a contagious disease that spreads around the [place] every year, usually between October and May. Flu is caused by influenza viruses, and is spread mainly by coughing, sneezing, and close contact. Anyone can get flu. Flu strikes suddenly and can last several days. Symptoms vary by age, but can include: 
 fever/chills  sore throat  muscle aches  fatigue  cough  headache  runny or stuffy nose Flu can also lead to pneumonia and blood infections, and cause diarrhea and seizures in children. If you have a medical condition, such as heart or lung disease, flu can make it worse. Flu is more dangerous for some people. Infants and young children, people 72years of age and older, pregnant women, and people with certain health conditions or a weakened immune system are at greatest risk. Each year thousands of people in the [facility] die from flu, and many more are hospitalized. Flu vaccine can: 
 keep you from getting flu, 
 make flu less severe if you do get it, and 
 keep you from spreading flu to your family and other people. 2. Inactivated and recombinant flu vaccines A dose of flu vaccine is recommended every flu season. Children 6 months through 6years of age may need two doses during the same flu season. Everyone else needs only one dose each flu season.   
 
 
Some inactivated flu vaccines contain a very small amount of a mercury-based preservative called thimerosal. Studies have not shown thimerosal in vaccines to be harmful, but flu vaccines that do not contain thimerosal are available. There is no live flu virus in flu shots. They cannot cause the flu. There are many flu viruses, and they are always changing. Each year a new flu vaccine is made to protect against three or four viruses that are likely to cause disease in the upcoming flu season. But even when the vaccine doesnt exactly match these viruses, it may still provide some protection Flu vaccine cannot prevent: 
 flu that is caused by a virus not covered by the vaccine, or 
 illnesses that look like flu but are not. It takes about 2 weeks for protection to develop after vaccination, and protection lasts through the flu season. 3. Some people should not get this vaccine Tell the person who is giving you the vaccine:  If you have any severe, life-threatening allergies. If you ever had a life-threatening allergic reaction after a dose of flu vaccine, or have a severe allergy to any part of this vaccine, you may be advised not to get vaccinated. Most, but not all, types of flu vaccine contain a small amount of egg protein.  If you ever had Guillain-Barré Syndrome (also called GBS). Some people with a history of GBS should not get this vaccine. This should be discussed with your doctor.  If you are not feeling well. It is usually okay to get flu vaccine when you have a mild illness, but you might be asked to come back when you feel better. 4. Risks of a vaccine reaction With any medicine, including vaccines, there is a chance of reactions. These are usually mild and go away on their own, but serious reactions are also possible. Most people who get a flu shot do not have any problems with it. Minor problems following a flu shot include:  
 soreness, redness, or swelling where the shot was given  hoarseness  sore, red or itchy eyes  cough  fever  aches  headache  itching  fatigue If these problems occur, they usually begin soon after the shot and last 1 or 2 days. More serious problems following a flu shot can include the following:  There may be a small increased risk of Guillain-Barré Syndrome (GBS) after inactivated flu vaccine. This risk has been estimated at 1 or 2 additional cases per million people vaccinated. This is much lower than the risk of severe complications from flu, which can be prevented by flu vaccine.  Young children who get the flu shot along with pneumococcal vaccine (PCV13) and/or DTaP vaccine at the same time might be slightly more likely to have a seizure caused by fever. Ask your doctor for more information. Tell your doctor if a child who is getting flu vaccine has ever had a seizure. Problems that could happen after any injected vaccine:  People sometimes faint after a medical procedure, including vaccination. Sitting or lying down for about 15 minutes can help prevent fainting, and injuries caused by a fall. Tell your doctor if you feel dizzy, or have vision changes or ringing in the ears.  Some people get severe pain in the shoulder and have difficulty moving the arm where a shot was given. This happens very rarely.  Any medication can cause a severe allergic reaction. Such reactions from a vaccine are very rare, estimated at about 1 in a million doses, and would happen within a few minutes to a few hours after the vaccination. As with any medicine, there is a very remote chance of a vaccine causing a serious injury or death. The safety of vaccines is always being monitored. For more information, visit: www.cdc.gov/vaccinesafety/ 
 
5. What if there is a serious reaction? What should I look for?  Look for anything that concerns you, such as signs of a severe allergic reaction, very high fever, or unusual behavior.  
 
Signs of a severe allergic reaction can include hives, swelling of the face and throat, difficulty breathing, a fast heartbeat, dizziness, and weakness  usually within a few minutes to a few hours after the vaccination. What should I do?  If you think it is a severe allergic reaction or other emergency that cant wait, call 9-1-1 and get the person to the nearest hospital. Otherwise, call your doctor.  Reactions should be reported to the Vaccine Adverse Event Reporting System (VAERS). Your doctor should file this report, or you can do it yourself through  the VAERS web site at www.vaers. [facility].gov, or by calling 9-991.248.3259. VAERS does not give medical advice. 6. The National Vaccine Injury Compensation Program 
 
The [facility] Vaccine Injury Compensation Program (VICP) is a federal program that was created to compensate people who may have been injured by certain vaccines. Persons who believe they may have been injured by a vaccine can learn about the program and about filing a claim by calling 5-546.436.8746 or visiting the [facility] [employer] website at www.[facility].gov/vaccinecompensation. There is a time limit to file a claim for compensation. 7. How can I learn more?  Ask your healthcare provider. He or she can give you the vaccine package insert or suggest other sources of information.  Call your local or state health department.  Contact the Centers for Disease Control and Prevention (CDC): 
- Call 5-223.436.5104 (1-800-CDC-INFO) or 
- Visit CDCs website at www.cdc.gov/flu Vaccine Information Statement Inactivated Influenza Vaccine 8/7/2015 
42 CARMELINA Funk 102RE-55 Department of Health and Gre[employer] Centers for Disease Control and Prevention Office Use Only

## 2018-11-16 ENCOUNTER — OFFICE VISIT (OUTPATIENT)
Dept: FAMILY MEDICINE CLINIC | Age: 68
End: 2018-11-16

## 2018-11-16 VITALS
HEART RATE: 83 BPM | WEIGHT: 221.4 LBS | BODY MASS INDEX: 37.8 KG/M2 | RESPIRATION RATE: 16 BRPM | TEMPERATURE: 96.3 F | OXYGEN SATURATION: 99 % | HEIGHT: 64 IN | DIASTOLIC BLOOD PRESSURE: 74 MMHG | SYSTOLIC BLOOD PRESSURE: 152 MMHG

## 2018-11-16 DIAGNOSIS — R73.9 ELEVATED BLOOD SUGAR: ICD-10-CM

## 2018-11-16 DIAGNOSIS — M85.80 OSTEOPENIA, UNSPECIFIED LOCATION: ICD-10-CM

## 2018-11-16 DIAGNOSIS — Z86.2 HISTORY OF SARCOIDOSIS: ICD-10-CM

## 2018-11-16 DIAGNOSIS — N18.30 CKD (CHRONIC KIDNEY DISEASE) STAGE 3, GFR 30-59 ML/MIN (HCC): Primary | ICD-10-CM

## 2018-11-16 DIAGNOSIS — D57.40 SICKLE CELL BETA THALASSEMIA (HCC): ICD-10-CM

## 2018-11-16 DIAGNOSIS — E66.01 SEVERE OBESITY WITH BODY MASS INDEX (BMI) OF 35.0 TO 39.9 WITH SERIOUS COMORBIDITY (HCC): ICD-10-CM

## 2018-11-16 DIAGNOSIS — I10 ESSENTIAL HYPERTENSION: ICD-10-CM

## 2018-11-16 RX ORDER — VALSARTAN AND HYDROCHLOROTHIAZIDE 320; 25 MG/1; MG/1
1 TABLET, FILM COATED ORAL DAILY
Qty: 90 TAB | Refills: 3 | Status: SHIPPED | OUTPATIENT
Start: 2018-11-16 | End: 2019-03-05 | Stop reason: DRUGHIGH

## 2018-11-16 NOTE — PROGRESS NOTES
Elle Rodriguez is a 79 y.o. female and presents with Follow Up Chronic Condition; Other (Cold intolerance); Hypertension; Blood sugar problem; and Results (labs) Subjective: 
 
Cold intolerance- was told by  she is lethargic recently as well. Some constipation. H/o sarcoidosis-some increased sob. No increase cough or chest pain. No longer seeing pulmonary 
htn- taking meds. No cp or edema. Elevated blood sugar- no polyuria. Obesity- gained weight- stopped chips but drinking ginger ale.  
 ckd- stable- reviewed labs with pt and this dx discussed. Assessment/Plan:   
Diagnoses and all orders for this visit: 
  
Encounter for immunization 
  
Cold intolerance- will check labs- no clear etiology on exam. Normal TSH/blood sugar and stable h/h. 
  
Sickle cell beta thalassemia ([address]- recheck cbc with stable hgb. 
  
Severe obesity with body mass index (BMI) of 35.0 to 39.9 with serious comorbidity ([address]- stressed importance of wt loss with caloric restriction. had some success since last visit. History of sarcoidosis- stable. Sees pulmonary.  
 
  
Elevated blood sugar- normal A1c 10/18/18 at 4.7% 
 
 
htn- will increase diovan/hct to 320/25. Osteopenia- due for next DEXA but pt wants to consider this next visit. Diagnoses and all orders for this visit: 1. CKD (chronic kidney disease) stage 3, GFR 30-59 ml/min ([facility]) 2. Sickle cell beta thalassemia ([facility]) 3. Severe obesity with body mass index (BMI) of 35.0 to 39.9 with serious comorbidity ([address] 4. History of sarcoidosis 5. Essential hypertension 6. Elevated blood sugar 7. Osteopenia, unspecified location Other orders 
-     valsartan-hydroCHLOROthiazide (DIOVAN-HCT) 320-25 mg per tablet; Take 1 Tab by mouth daily. -     diph,Pertuss,Acell,,Tet Vac-PF (ADACEL) 2 Lf-(2.5-5-3-5 mcg)-5Lf/0.5 mL susp; 0.5 mL by IntraMUSCular route once for 1 dose. RTC in 3  Months. Orders Placed This Encounter  valsartan-hydroCHLOROthiazide (DIOVAN-HCT) 320-25 mg per tablet Sig: Take 1 Tab by mouth daily. Dispense:  90 Tab Refill:  3  
 diph,Pertuss,Acell,,Tet Vac-PF (ADACEL) 2 Lf-(2.5-5-3-5 mcg)-5Lf/0.5 mL susp Si.5 mL by IntraMUSCular route once for 1 dose. Dispense:  1 Syringe Refill:  0  
 
 
 
ROS: 
Negative except as mentioned above Cardiac- no chest pain or palpitations Pulmonary- no sob or wheezes GI- no n/v or diarrhea. SH: Social History Tobacco Use  Smoking status: Never Smoker  Smokeless tobacco: Never Used Substance Use Topics  Alcohol use: No  
 Drug use: Not on file Medications/Allergies: 
Current Outpatient Medications on File Prior to Visit Medication Sig Dispense Refill  budesonide-formoterol (SYMBICORT) 80-4.5 mcg/actuation HFAA Take 2 Puffs by inhalation two (2) times a day. Use for 2 weeks at a time. 1 Inhaler 2  
 albuterol (PROVENTIL HFA, VENTOLIN HFA, PROAIR HFA) 90 mcg/actuation inhaler Take 2 Puffs by inhalation every six (6) hours as needed for Wheezing. 1 Inhaler 2  
 valsartan-hydroCHLOROthiazide (DIOVAN-HCT) 160-12.5 mg per tablet Take 1 Tab by mouth daily. 90 Tab 3  
 rivaroxaban (XARELTO) 20 mg tab tablet Take 1 Tab by mouth daily (with breakfast). 90 Tab 3  
 omeprazole (PRILOSEC) 20 mg capsule Take 20 mg by mouth daily.  aluminum hydrox-magnesium carb (GAVISCON)  mg/15 mL suspension Take 15 mL by mouth three (3) times daily.  Peak Flow Meter lanette 1 Units by Does Not Apply route daily. 1 Device 0  
 tiZANidine (ZANAFLEX) 2 mg tablet Take 1 tablet 3 times a day as needed 30 Tab 0  
 acetaminophen (TYLENOL EXTRA STRENGTH) 500 mg tablet Take 500 mg by mouth every four (4) hours as needed for Pain.  folic acid (FOLVITE) 1 mg tablet Take  by mouth daily.  brimonidine-timolol (COMBIGAN) 0.2-0.5 % drop ophthalmic solution Administer 1 Drop to left eye daily.  ranitidine (ZANTAC) 150 mg tablet Take  by mouth as needed for Indigestion.  Cetirizine (ZYRTEC) 10 mg cap Take 10 mg by mouth as needed.  calcium-cholecalciferol, d3, (CALCIUM 600 + D) 600-125 mg-unit tab Take 600 mg by mouth daily.  docusate sodium (STOOL SOFTENER) 100 mg capsule Take 100 mg by mouth daily.  sodium chloride (AYR SALINE) 0.65 % nasal spray 1 Spray as needed for Congestion. No current facility-administered medications on file prior to visit. Allergies Allergen Reactions  Penicillin G Hives Objective: 
Visit Vitals /74 Pulse 83 Temp 96.3 °F (35.7 °C) (Oral) Resp 16 Ht 5' 4\" (1.626 m) Wt 221 lb 6.4 oz (100.4 kg) BMI 38.00 kg/m² Body mass index is 38 kg/m². Constitutional: Well developed, nourished, no distress, alert CV: S1, S2.  RRR. No murmurs/rubs. No thrills palpated. No carotid bruits. Intact distal pulses. No edema. Pulm: No abnormalities on inspection. Clear to auscultation bilaterally. No wheezing/rhonchi. Normal effort. GI: Soft, nontender, nondistended. Normal active bowel sounds. No  masses on palpation. No hepatosplenomegaly.

## 2018-11-16 NOTE — PATIENT INSTRUCTIONS
Body Mass Index: Care Instructions Your Care Instructions Body mass index (BMI) can help you see if your weight is raising your risk for health problems. It uses a formula to compare how much you weigh with how tall you are. · A BMI lower than 18.5 is considered underweight. · A BMI between 18.5 and 24.9 is considered healthy. · A BMI between 25 and 29.9 is considered overweight. A BMI of 30 or higher is considered obese. If your BMI is in the normal range, it means that you have a lower risk for weight-related health problems. If your BMI is in the overweight or obese range, you may be at increased risk for weight-related health problems, such as high blood pressure, heart disease, stroke, arthritis or joint pain, and diabetes. If your BMI is in the underweight range, you may be at increased risk for health problems such as fatigue, lower protection (immunity) against illness, muscle loss, bone loss, hair loss, and hormone problems. BMI is just one measure of your risk for weight-related health problems. You may be at higher risk for health problems if you are not active, you eat an unhealthy diet, or you drink too much alcohol or use tobacco products. Follow-up care is a key part of your treatment and safety. Be sure to make and go to all appointments, and call your doctor if you are having problems. It's also a good idea to know your test results and keep a list of the medicines you take. How can you care for yourself at home? · Practice healthy eating habits. This includes eating plenty of fruits, vegetables, whole grains, lean protein, and low-fat dairy. · If your doctor recommends it, get more exercise. Walking is a good choice. Bit by bit, increase the amount you walk every day. Try for at least 30 minutes on most days of the week. · Do not smoke. Smoking can increase your risk for health problems.  If you need help quitting, talk to your doctor about stop-smoking programs and medicines. These can increase your chances of quitting for good. · Limit alcohol to 2 drinks a day for men and 1 drink a day for women. Too much alcohol can cause health problems. If you have a BMI higher than 25 · Your doctor may do other tests to check your risk for weight-related health problems. This may include measuring the distance around your waist. A waist measurement of more than 40 inches in men or 35 inches in women can increase the risk of weight-related health problems. · Talk with your doctor about steps you can take to stay healthy or improve your health. You may need to make lifestyle changes to lose weight and stay healthy, such as changing your diet and getting regular exercise. If you have a BMI lower than 18.5 · Your doctor may do other tests to check your risk for health problems. · Talk with your doctor about steps you can take to stay healthy or improve your health. You may need to make lifestyle changes to gain or maintain weight and stay healthy, such as getting more healthy foods in your diet and doing exercises to build muscle. Where can you learn more? Go to http://diego-beatriz.info/. Enter S176 in the search box to learn more about \"Body Mass Index: Care Instructions. \" Current as of: October 13, 2016 Content Version: 11.4 © 3600-5630 [employer]. Care instructions adapted under license by [employer] (which disclaims liability or warranty for this information). If you have questions about a medical condition or this instruction, always ask your healthcare professional. [address] any warranty or liability for your use of this information.

## 2018-11-16 NOTE — PROGRESS NOTES
1. Have you been to the ER, urgent care clinic since your last visit? Hospitalized since your last visit? No.  
 
2. Have you seen or consulted any other health care providers outside of the [address] [place] [address] since your last visit? Include any pap smears or colon screening. No.  
 
Chief Complaint Patient presents with  Follow Up Chronic Condition  Other Cold intolerance  Hypertension  Blood sugar problem  Results  
  labs

## 2018-11-27 ENCOUNTER — OFFICE VISIT (OUTPATIENT)
Dept: FAMILY MEDICINE CLINIC | Age: 68
End: 2018-11-27

## 2018-11-27 VITALS
DIASTOLIC BLOOD PRESSURE: 81 MMHG | TEMPERATURE: 96.6 F | RESPIRATION RATE: 17 BRPM | HEIGHT: 64 IN | SYSTOLIC BLOOD PRESSURE: 164 MMHG | WEIGHT: 219.2 LBS | BODY MASS INDEX: 37.42 KG/M2 | HEART RATE: 74 BPM | OXYGEN SATURATION: 99 %

## 2018-11-27 DIAGNOSIS — J01.40 ACUTE NON-RECURRENT PANSINUSITIS: Primary | ICD-10-CM

## 2018-11-27 RX ORDER — AZELASTINE 1 MG/ML
1 SPRAY, METERED NASAL 2 TIMES DAILY
Qty: 1 BOTTLE | Refills: 0 | Status: SHIPPED | OUTPATIENT
Start: 2018-11-27 | End: 2021-09-23 | Stop reason: ALTCHOICE

## 2018-11-27 NOTE — PATIENT INSTRUCTIONS
Upper respiratory infections/sinusitis/colds    Take an antihistamine such as Zyrtec, Claritin, Xyzal  or Allegra for sinus and nasal congestion. All are available over the counter, generics are fine. Try a nasal rinse with a neti pot, or device of choice. Do not use tap water. Use distilled or sterile water available at the pharmacy. Ibuprofen or tylenol for aches and pains. They can be taken alternating between the two. Flonase nasal spray for nasal congestion, use daily. This is available over the counter. Flonase decreases inflammation and afrin dries up congestion. Take over the counter cough medication for cough. If you have high blood pressure, Coricidin has a high blood pressure formulation.    Stay hydrated    Use flonase first, try neti pot and then use azelastine

## 2018-11-27 NOTE — PROGRESS NOTES
Chief Complaint   Patient presents with    Sinus Pain     with drainage     1. Have you been to the ER, urgent care clinic since your last visit? Hospitalized since your last visit? No    2. Have you seen or consulted any other health care providers outside of the [address] [place] [address] since your last visit? Include any pap smears or colon screening.  No

## 2018-12-13 ENCOUNTER — DOCUMENTATION ONLY (OUTPATIENT)
Dept: FAMILY MEDICINE CLINIC | Age: 68
End: 2018-12-13

## 2019-01-31 RX ORDER — RIVAROXABAN 20 MG/1
TABLET, FILM COATED ORAL
Qty: 90 TAB | Refills: 3 | Status: SHIPPED | OUTPATIENT
Start: 2019-01-31 | End: 2020-03-25

## 2019-02-08 RX ORDER — FOLIC ACID 1 MG/1
1 TABLET ORAL DAILY
Qty: 90 TAB | Refills: 3 | Status: SHIPPED | OUTPATIENT
Start: 2019-02-08 | End: 2019-03-19 | Stop reason: ALTCHOICE

## 2019-02-21 ENCOUNTER — DOCUMENTATION ONLY (OUTPATIENT)
Dept: INTERNAL MEDICINE CLINIC | Age: 69
End: 2019-02-21

## 2019-03-06 RX ORDER — VALSARTAN AND HYDROCHLOROTHIAZIDE 320; 25 MG/1; MG/1
1 TABLET, FILM COATED ORAL DAILY
Qty: 90 TAB | Refills: 3 | Status: SHIPPED | OUTPATIENT
Start: 2019-03-06 | End: 2019-05-29

## 2019-03-18 ENCOUNTER — DOCUMENTATION ONLY (OUTPATIENT)
Dept: FAMILY MEDICINE CLINIC | Age: 69
End: 2019-03-18

## 2019-03-19 ENCOUNTER — OFFICE VISIT (OUTPATIENT)
Dept: FAMILY MEDICINE CLINIC | Age: 69
End: 2019-03-19

## 2019-03-19 VITALS
BODY MASS INDEX: 36.88 KG/M2 | SYSTOLIC BLOOD PRESSURE: 139 MMHG | HEART RATE: 83 BPM | WEIGHT: 216 LBS | DIASTOLIC BLOOD PRESSURE: 81 MMHG | HEIGHT: 64 IN | RESPIRATION RATE: 16 BRPM | TEMPERATURE: 95.7 F

## 2019-03-19 DIAGNOSIS — E55.9 VITAMIN D DEFICIENCY: ICD-10-CM

## 2019-03-19 DIAGNOSIS — M85.80 OSTEOPENIA, UNSPECIFIED LOCATION: ICD-10-CM

## 2019-03-19 DIAGNOSIS — Z13.39 SCREENING FOR ALCOHOLISM: ICD-10-CM

## 2019-03-19 DIAGNOSIS — Z86.2 HISTORY OF SARCOIDOSIS: ICD-10-CM

## 2019-03-19 DIAGNOSIS — Z00.00 MEDICARE ANNUAL WELLNESS VISIT, SUBSEQUENT: ICD-10-CM

## 2019-03-19 DIAGNOSIS — Z78.0 POSTMENOPAUSE: ICD-10-CM

## 2019-03-19 DIAGNOSIS — I10 ESSENTIAL HYPERTENSION: Primary | ICD-10-CM

## 2019-03-19 DIAGNOSIS — R73.9 ELEVATED BLOOD SUGAR: ICD-10-CM

## 2019-03-19 DIAGNOSIS — N18.30 CKD (CHRONIC KIDNEY DISEASE) STAGE 3, GFR 30-59 ML/MIN (HCC): ICD-10-CM

## 2019-03-19 DIAGNOSIS — Z13.31 SCREENING FOR DEPRESSION: ICD-10-CM

## 2019-03-19 DIAGNOSIS — D57.40 SICKLE CELL BETA THALASSEMIA (HCC): ICD-10-CM

## 2019-03-19 DIAGNOSIS — E66.9 OBESITY (BMI 30-39.9): ICD-10-CM

## 2019-03-19 NOTE — PROGRESS NOTES
Layla Herndon is a 79 y.o. female and presents with Follow Up Chronic Condition; Hypertension; Blood sugar problem; Chronic Kidney Disease; Obesity; and Other (h/o sarcoidosis) Subjective: 
 
Cold intolerance- was told by  she is lethargic recently as well. Some constipation. H/o sarcoidosis and asthma- saw pulmonary 3/5/19-some increased sob. No increase cough or chest pain. No longer seeing pulmonary 
htn- taking meds. No cp or edema. Elevated blood sugar- no polyuria. Last A1c 4.7% 10/2018 Obesity- lost weight- stopped chips but drinking ginger ale.  
 ckd- stable- reviewed labs with pt and this dx discussed. Osteopenia- due for next dexa- take ca and vit d- thinks she was low on vit D in the past.  
 
Assessment/Plan:   
Diagnoses and all orders for this visit: 
  
Encounter for immunization 
  
Cold intolerance- will check labs- no clear etiology on exam. Normal TSH/blood sugar and stable h/h. 
  
Sickle cell beta thalassemia ([facility]- recheck cbc with stable hgb. 9.4 on 10/2018.  
  
Severe obesity with body mass index (BMI) of 35.0 to 39.9 with serious comorbidity ([address]- again stressed importance of wt loss with caloric restriction. had some success since last visit. History of sarcoidosis and asthma- stable. Sees pulmonary. Elevated blood sugar- normal A1c 10/18/18 at 4.7% 
 
htn- continue diovan/hct at 320/25. Osteopenia/postmenopause- 10/2016 T score -1.0- due for next DEXA - discussed and pt will proceed with this. Diagnoses and all orders for this visit: 1. Essential hypertension 
-     CBC W/O DIFF; Future 2. Elevated blood sugar 3. CKD (chronic kidney disease) stage 3, GFR 30-59 ml/min ([facility]) 
-     CBC W/O DIFF; Future 4. Osteopenia, unspecified location -     DEXA BONE DENSITY STUDY AXIAL; Future 5. Sickle cell beta thalassemia ([facility]) 6. History of sarcoidosis 7. Obesity (BMI 30-39.9) 8. Postmenopause -     DEXA BONE DENSITY STUDY AXIAL; Future 9. Vitamin D deficiency 
-     VITAMIN D, 25 HYDROXY; Future -     METABOLIC PANEL, BASIC; Future RTC in 3  Months. No orders of the defined types were placed in this encounter. ROS: 
Negative except as mentioned above Cardiac- no chest pain or palpitations Pulmonary- no sob or wheezes GI- no n/v or diarrhea. SH: Social History Tobacco Use  Smoking status: Never Smoker  Smokeless tobacco: Never Used Substance Use Topics  Alcohol use: No  
 Drug use: Not on file Medications/Allergies: 
Current Outpatient Medications on File Prior to Visit Medication Sig Dispense Refill  valsartan-hydroCHLOROthiazide (DIOVAN-HCT) 320-25 mg per tablet Take 1 Tab by mouth daily. 90 Tab 3  
 folic acid (FOLVITE) 1 mg tablet Take 1 Tab by mouth daily. 90 Tab 3  XARELTO 20 mg tab tablet take 1 tablet by mouth once daily WITH BREAKFAST 90 Tab 3  
 azelastine (ASTELIN) 137 mcg (0.1 %) nasal spray 1 [place] by Both Nostrils route two (2) times a day. Use in each nostril as directed 1 Bottle 0  
 budesonide-formoterol (SYMBICORT) 80-4.5 mcg/actuation HFAA Take 2 Puffs by inhalation two (2) times a day. Use for 2 weeks at a time. 1 Inhaler 2  
 albuterol (PROVENTIL HFA, VENTOLIN HFA, PROAIR HFA) 90 mcg/actuation inhaler Take 2 Puffs by inhalation every six (6) hours as needed for Wheezing. 1 Inhaler 2  
 omeprazole (PRILOSEC) 20 mg capsule Take 20 mg by mouth daily.  aluminum hydrox-magnesium carb (GAVISCON)  mg/15 mL suspension Take 15 mL by mouth three (3) times daily.  Peak Flow Meter lanette 1 Units by Does Not Apply route daily. 1 Device 0  
 tiZANidine (ZANAFLEX) 2 mg tablet Take 1 tablet 3 times a day as needed 30 Tab 0  
 acetaminophen (TYLENOL EXTRA STRENGTH) 500 mg tablet Take 500 mg by mouth every four (4) hours as needed for Pain.  brimonidine-timolol (COMBIGAN) 0.2-0.5 % drop ophthalmic solution Administer 1 Drop to left eye daily.  ranitidine (ZANTAC) 150 mg tablet Take  by mouth as needed for Indigestion.  Cetirizine (ZYRTEC) 10 mg cap Take 10 mg by mouth as needed.  calcium-cholecalciferol, d3, (CALCIUM 600 + D) 600-125 mg-unit tab Take 600 mg by mouth daily.  docusate sodium (STOOL SOFTENER) 100 mg capsule Take 100 mg by mouth daily.  sodium chloride (AYR SALINE) 0.65 % nasal spray 1 Spray as needed for Congestion. No current facility-administered medications on file prior to visit. Allergies Allergen Reactions  Penicillin G Hives Objective: 
Visit Vitals /81 Pulse 83 Temp 95.7 °F (35.4 °C) (Oral) Resp 16 Ht 5' 4\" (1.626 m) Wt 216 lb (98 kg) BMI 37.08 kg/m² Body mass index is 37.08 kg/m². Constitutional: Well developed, nourished, no distress, alert CV: S1, S2.  RRR. No murmurs/rubs. No thrills palpated. No carotid bruits. Intact distal pulses. No edema. Pulm: No abnormalities on inspection. Clear to auscultation bilaterally. No wheezing/rhonchi. Normal effort. GI: Soft, nontender, nondistended. Normal active bowel sounds. No  masses on palpation. No hepatosplenomegaly. This is the Subsequent Medicare Annual Wellness Exam, performed 12 months or more after the Initial AWV or the last Subsequent AWV I have reviewed the patient's medical history in detail and updated the computerized patient record. History Past Medical History:  
Diagnosis Date  Asthma  Hypertension  Migraine  Sarcoidosis  Sickle cell anemia ([facility] Past Surgical History:  
Procedure Laterality Date  HX COLONOSCOPY  2013  HX GYN Hysterectomy  HX HEMORRHOIDECTOMY  HX HIP REPLACEMENT Bilateral 1996 and 2000  HX KNEE REPLACEMENT Right 03/17/2016  HX ORTHOPAEDIC    
 HX RETINAL DETACHMENT REPAIR Left 1995 Current Outpatient Medications Medication Sig Dispense Refill  valsartan-hydroCHLOROthiazide (DIOVAN-HCT) 320-25 mg per tablet Take 1 Tab by mouth daily. 90 Tab 3  XARELTO 20 mg tab tablet take 1 tablet by mouth once daily WITH BREAKFAST 90 Tab 3  
 azelastine (ASTELIN) 137 mcg (0.1 %) nasal spray 1 [place] by Both Nostrils route two (2) times a day. Use in each nostril as directed 1 Bottle 0  
 budesonide-formoterol (SYMBICORT) 80-4.5 mcg/actuation HFAA Take 2 Puffs by inhalation two (2) times a day. Use for 2 weeks at a time. 1 Inhaler 2  
 albuterol (PROVENTIL HFA, VENTOLIN HFA, PROAIR HFA) 90 mcg/actuation inhaler Take 2 Puffs by inhalation every six (6) hours as needed for Wheezing. 1 Inhaler 2  
 omeprazole (PRILOSEC) 20 mg capsule Take 20 mg by mouth daily.  aluminum hydrox-magnesium carb (GAVISCON)  mg/15 mL suspension Take 15 mL by mouth three (3) times daily.  Peak Flow Meter lanette 1 Units by Does Not Apply route daily. 1 Device 0  
 tiZANidine (ZANAFLEX) 2 mg tablet Take 1 tablet 3 times a day as needed 30 Tab 0  
 acetaminophen (TYLENOL EXTRA STRENGTH) 500 mg tablet Take 500 mg by mouth every four (4) hours as needed for Pain.  brimonidine-timolol (COMBIGAN) 0.2-0.5 % drop ophthalmic solution Administer 1 Drop to left eye daily.  ranitidine (ZANTAC) 150 mg tablet Take  by mouth as needed for Indigestion.  Cetirizine (ZYRTEC) 10 mg cap Take 10 mg by mouth as needed.  calcium-cholecalciferol, d3, (CALCIUM 600 + D) 600-125 mg-unit tab Take 600 mg by mouth daily.  docusate sodium (STOOL SOFTENER) 100 mg capsule Take 100 mg by mouth daily.  sodium chloride (AYR SALINE) 0.65 % nasal spray 1 Spray as needed for Congestion. Allergies Allergen Reactions  Penicillin G Hives Family History Problem Relation Age of Onset  Diabetes Mother  Hypertension Mother  Cancer Father  Heart Disease Father  Sickle Cell Anemia Sister  Sickle Cell Anemia Brother  Hypertension Sister Social History Tobacco Use  Smoking status: Never Smoker  Smokeless tobacco: Never Used Substance Use Topics  Alcohol use: No  
 
Patient Active Problem List  
Diagnosis Code  Sickle cell beta thalassemia (HCC) D57.40  
 History of sarcoidosis Z86.2  Personal history of DVT (deep vein thrombosis) Z86.718  Obesity (BMI 30-39. 9) E66.9  Microcytic anemia D50.9  Hearing loss H91.90  
 Osteopenia M85.80  
 History of hysterectomy Z90.710  Right bundle branch block (RBBB) on electrocardiogram (ECG) I45.10  Severe obesity with body mass index (BMI) of 35.0 to 39.9 with serious comorbidity (HCC) E66.01 Depression Risk Factor Screening:  
 
3 most recent PHQ Screens 11/16/2018 Little interest or pleasure in doing things Not at all Feeling down, depressed, irritable, or hopeless Not at all Total Score PHQ 2 0 Alcohol Risk Factor Screening: You do not drink alcohol or very rarely. Functional Ability and Level of Safety:  
Hearing Loss Hearing is good. The patient wears hearing aids. Activities of Daily Living The home contains: handrails Patient does total self care Fall Risk Fall Risk Assessment, last 12 mths 3/19/2019 Able to walk? Yes Fall in past 12 months? No  
Fall with injury? -  
Number of falls in past 12 months - Fall Risk Score -  
 
 
Abuse Screen Patient is not abused Cognitive Screening Evaluation of Cognitive Function: 
Has your family/caregiver stated any concerns about your memory: no 
Normal 
 
Patient Care Team  
Patient Care Team: 
Byron Driscoll MD as PCP - General (Internal Medicine) Mildred Gu MD (Ophthalmology) Jonel Goodpasture, MD (Orthopedic Surgery) Assessment/Plan Education and counseling provided: 
Are appropriate based on today's review and evaluation End-of-Life planning (with patient's consent) Diagnoses and all orders for this visit: 1. Essential hypertension 
-     CBC W/O DIFF; Future 2. Elevated blood sugar 3. CKD (chronic kidney disease) stage 3, GFR 30-59 ml/min ([facility]) 
-     CBC W/O DIFF; Future 4. Osteopenia, unspecified location -     DEXA BONE DENSITY STUDY AXIAL; Future 5. Sickle cell beta thalassemia ([facility]) 6. History of sarcoidosis 7. Obesity (BMI 30-39.9) 8. Postmenopause -     DEXA BONE DENSITY STUDY AXIAL; Future 9. Vitamin D deficiency 
-     VITAMIN D, 25 HYDROXY; Future -     METABOLIC PANEL, BASIC; Future 10. Medicare annual wellness visit, subsequent 11. Screening for alcoholism 12. Screening for depression Health Maintenance Due Topic Date Due  
 DTaP/Tdap/Td series (1 - Tdap) 07/03/1973  Shingrix Vaccine Age 50> (2 of 2) 09/26/2018  MEDICARE YEARLY EXAM  11/22/2018

## 2019-03-19 NOTE — PATIENT INSTRUCTIONS
Body Mass Index: Care Instructions Your Care Instructions Body mass index (BMI) can help you see if your weight is raising your risk for health problems. It uses a formula to compare how much you weigh with how tall you are. · A BMI lower than 18.5 is considered underweight. · A BMI between 18.5 and 24.9 is considered healthy. · A BMI between 25 and 29.9 is considered overweight. A BMI of 30 or higher is considered obese. If your BMI is in the normal range, it means that you have a lower risk for weight-related health problems. If your BMI is in the overweight or obese range, you may be at increased risk for weight-related health problems, such as high blood pressure, heart disease, stroke, arthritis or joint pain, and diabetes. If your BMI is in the underweight range, you may be at increased risk for health problems such as fatigue, lower protection (immunity) against illness, muscle loss, bone loss, hair loss, and hormone problems. BMI is just one measure of your risk for weight-related health problems. You may be at higher risk for health problems if you are not active, you eat an unhealthy diet, or you drink too much alcohol or use tobacco products. Follow-up care is a key part of your treatment and safety. Be sure to make and go to all appointments, and call your doctor if you are having problems. It's also a good idea to know your test results and keep a list of the medicines you take. How can you care for yourself at home? · Practice healthy eating habits. This includes eating plenty of fruits, vegetables, whole grains, lean protein, and low-fat dairy. · If your doctor recommends it, get more exercise. Walking is a good choice. Bit by bit, increase the amount you walk every day. Try for at least 30 minutes on most days of the week. · Do not smoke. Smoking can increase your risk for health problems.  If you need help quitting, talk to your doctor about stop-smoking programs and medicines. These can increase your chances of quitting for good. · Limit alcohol to 2 drinks a day for men and 1 drink a day for women. Too much alcohol can cause health problems. If you have a BMI higher than 25 · Your doctor may do other tests to check your risk for weight-related health problems. This may include measuring the distance around your waist. A waist measurement of more than 40 inches in men or 35 inches in women can increase the risk of weight-related health problems. · Talk with your doctor about steps you can take to stay healthy or improve your health. You may need to make lifestyle changes to lose weight and stay healthy, such as changing your diet and getting regular exercise. If you have a BMI lower than 18.5 · Your doctor may do other tests to check your risk for health problems. · Talk with your doctor about steps you can take to stay healthy or improve your health. You may need to make lifestyle changes to gain or maintain weight and stay healthy, such as getting more healthy foods in your diet and doing exercises to build muscle. Where can you learn more? Go to http://diego-beatriz.info/. Enter S176 in the search box to learn more about \"Body Mass Index: Care Instructions. \" Current as of: June 25, 2018 Content Version: 11.9 © 7419-1454 [employer], [employer]. Care instructions adapted under license by [employer] (which disclaims liability or warranty for this information). If you have questions about a medical condition or this instruction, always ask your healthcare professional. [address] any warranty or liability for your use of this information. DASH Diet: Care Instructions Your Care Instructions The DASH diet is an eating plan that can help lower your blood pressure. DASH stands for Dietary Approaches to Stop Hypertension. Hypertension is high blood pressure. The DASH diet focuses on eating foods that are high in calcium, potassium, and magnesium. These nutrients can lower blood pressure. The foods that are highest in these nutrients are fruits, vegetables, low-fat dairy products, nuts, seeds, and legumes. But taking calcium, potassium, and magnesium supplements instead of eating foods that are high in those nutrients does not have the same effect. The DASH diet also includes whole grains, fish, and poultry. The DASH diet is one of several lifestyle changes your doctor may recommend to lower your high blood pressure. Your doctor may also want you to decrease the amount of sodium in your diet. Lowering sodium while following the DASH diet can lower blood pressure even further than just the DASH diet alone. Follow-up care is a key part of your treatment and safety. Be sure to make and go to all appointments, and call your doctor if you are having problems. It's also a good idea to know your test results and keep a list of the medicines you take. How can you care for yourself at home? Following the DASH diet · Eat 4 to 5 servings of fruit each day. A serving is 1 medium-sized piece of fruit, ½ cup chopped or canned fruit, 1/4 cup dried fruit, or 4 ounces (½ cup) of fruit juice. Choose fruit more often than fruit juice. · Eat 4 to 5 servings of vegetables each day. A serving is 1 cup of lettuce or raw leafy vegetables, ½ cup of chopped or cooked vegetables, or 4 ounces (½ cup) of vegetable juice. Choose vegetables more often than vegetable juice. · Get 2 to 3 servings of low-fat and fat-free dairy each day. A serving is 8 ounces of milk, 1 cup of yogurt, or 1 ½ ounces of cheese. · Eat 6 to 8 servings of grains each day. A serving is 1 slice of bread, 1 ounce of dry cereal, or ½ cup of cooked rice, pasta, or cooked cereal. Try to choose whole-grain products as much as possible. · Limit lean meat, poultry, and fish to 2 servings each day.  A serving is 3 ounces, about the size of a deck of cards. · Eat 4 to 5 servings of nuts, seeds, and legumes (cooked dried beans, lentils, and split peas) each week. A serving is 1/3 cup of nuts, 2 tablespoons of seeds, or ½ cup of cooked beans or peas. · Limit fats and oils to 2 to 3 servings each day. A serving is 1 teaspoon of vegetable oil or 2 tablespoons of salad dressing. · Limit sweets and added sugars to 5 servings or less a week. A serving is 1 tablespoon jelly or jam, ½ cup sorbet, or 1 cup of lemonade. · Eat less than 2,300 milligrams (mg) of sodium a day. If you limit your sodium to 1,500 mg a day, you can lower your blood pressure even more. Tips for success · Start small. Do not try to make dramatic changes to your diet all at once. You might feel that you are missing out on your favorite foods and then be more likely to not follow the plan. Make small changes, and stick with them. Once those changes become habit, add a few more changes. · Try some of the following: ? Make it a goal to eat a fruit or vegetable at every meal and at snacks. This will make it easy to get the recommended amount of fruits and vegetables each day. ? Try yogurt topped with fruit and nuts for a snack or healthy dessert. ? Add lettuce, tomato, cucumber, and onion to sandwiches. ? Combine a ready-made pizza crust with low-fat mozzarella cheese and lots of vegetable toppings. Try using tomatoes, squash, spinach, broccoli, carrots, cauliflower, and onions. ? Have a variety of cut-up vegetables with a low-fat dip as an appetizer instead of chips and dip. ? Sprinkle sunflower seeds or chopped almonds over salads. Or try adding chopped walnuts or almonds to cooked vegetables. ? Try some vegetarian meals using beans and peas. Add garbanzo or kidney beans to salads. Make burritos and tacos with mashed guzman beans or black beans. Where can you learn more? Go to http://cortez-beatriz.info/. Enter U141 in the search box to learn more about \"DASH Diet: Care Instructions. \" Current as of: July 22, 2018 Content Version: 11.9 © 4791-5378 [employer], [employer] Care instructions adapted under license by [employer] (which disclaims liability or warranty for this information). If you have questions about a medical condition or this instruction, always ask your healthcare professional. [place] [address] any warranty or liability for your use of this information. Medicare Wellness Visit, Female The best way to live healthy is to have a lifestyle where you eat a well-balanced diet, exercise regularly, limit alcohol use, and quit all forms of tobacco/nicotine, if applicable. Regular preventive services are another way to keep healthy. Preventive services (vaccines, screening tests, monitoring & exams) can help personalize your care plan, which helps you manage your own care. Screening tests can find health problems at the earliest stages, when they are easiest to treat. [facility] follows the current, evidence-based guidelines published by the [facility] Gregory Ivon (USPSTF) when recommending preventive services for our patients. Because we follow these guidelines, sometimes recommendations change over time as research supports it. (For example, mammograms used to be recommended annually. Even though Medicare will still pay for an annual mammogram, the newer guidelines recommend a mammogram every two years for women of average risk.) Of course, you and your doctor may decide to screen more often for some diseases, based on your risk and your health status. Preventive services for you include: - Medicare offers their members a free annual wellness visit, which is time for you and your primary care provider to discuss and plan for your preventive service needs. Take advantage of this benefit every year! -All adults over the age of 72 should receive the recommended pneumonia vaccines. Current USPSTF guidelines recommend a series of two vaccines for the best pneumonia protection.  
-All adults should have a flu vaccine yearly and a tetanus vaccine every 10 years. All adults age 61 and older should receive a shingles vaccine once in their lifetime.   
-A bone mass density test is recommended when a woman turns 65 to screen for osteoporosis. This test is only recommended one time, as a screening. Some providers will use this same test as a disease monitoring tool if you already have osteoporosis. -All adults age 38-68 who are overweight should have a diabetes screening test once every three years.  
-Other screening tests and preventive services for persons with diabetes include: an eye exam to screen for diabetic retinopathy, a kidney function test, a foot exam, and stricter control over your cholesterol.  
-Cardiovascular screening for adults with routine risk involves an electrocardiogram (ECG) at intervals determined by your doctor.  
-Colorectal cancer screenings should be done for adults age 54-65 with no increased risk factors for colorectal cancer. There are a number of acceptable methods of screening for this type of cancer. Each test has its own benefits and drawbacks. Discuss with your doctor what is most appropriate for you during your annual wellness visit. The different tests include: colonoscopy (considered the best screening method), a fecal occult blood test, a fecal DNA test, and sigmoidoscopy. -Breast cancer screenings are recommended every other year for women of normal risk, age 54-69. 
-Cervical cancer screenings for women over age 72 are only recommended with certain risk factors.  
-All adults born between [facility] should be screened once for Hepatitis C. Here is a list of your current Health Maintenance items (your personalized list of preventive services) with a due date: Health Maintenance Due Topic Date Due  
 DTaP/Tdap/Td  (1 - Tdap) 07/03/1973  Shingles Vaccine (2 of 2) 09/26/2018 [facility] Annual Well Visit  11/22/2018

## 2019-03-19 NOTE — PROGRESS NOTES
1. Have you been to the ER, urgent care clinic since your last visit? Hospitalized since your last visit? No. 
 
2. Have you seen or consulted any other health care providers outside of the [address] [place] [address] since your last visit? Include any pap smears or colon screening. Yes, saw her Pulmonologist on 3/5/2019. Chief Complaint Patient presents with  Follow Up Chronic Condition  Hypertension  Blood sugar problem  Chronic Kidney Disease  Obesity  Other  
  h/o sarcoidosis

## 2019-03-19 NOTE — ACP (ADVANCE CARE PLANNING)
Advance Care Planning (ACP) Provider Conversation Snapshot    Date of ACP Conversation: 03/19/19  Persons included in Conversation:  patient  Length of ACP Conversation in minutes:  <16 minutes (Non-Billable)    Authorized Decision Maker (if patient is incapable of making informed decisions): This person is: Other Legally Authorized Decision Maker (e.g. Next of Kin)            For Patients with Decision Making Capacity:   Values/Goals: Exploration of values, goals, and preferences if recovery is not expected, even with continued medical treatment in the event of:  Imminent death  Severe, permanent brain injury  . \"In these circumstances, what matters most to you? \"  Care focused more on comfort or quality of life.   Oswaldo Bass Outcomes / Follow-Up Plan:   Recommended completion of Advance Directive form after review of ACP materials and conversation with prospective healthcare agent

## 2019-05-27 ENCOUNTER — HOSPITAL ENCOUNTER (EMERGENCY)
Age: 69
Discharge: HOME | End: 2019-05-27
Attending: EMERGENCY MEDICINE
Payer: MEDICARE

## 2019-05-27 VITALS
SYSTOLIC BLOOD PRESSURE: 158 MMHG | WEIGHT: 217 LBS | HEART RATE: 84 BPM | RESPIRATION RATE: 16 BRPM | TEMPERATURE: 98.5 F | OXYGEN SATURATION: 97 % | DIASTOLIC BLOOD PRESSURE: 96 MMHG | BODY MASS INDEX: 37.05 KG/M2 | HEIGHT: 64 IN

## 2019-05-27 DIAGNOSIS — N30.00 ACUTE CYSTITIS WITHOUT HEMATURIA: Primary | ICD-10-CM

## 2019-05-27 DIAGNOSIS — K59.03 DRUG-INDUCED CONSTIPATION: ICD-10-CM

## 2019-05-27 LAB
APPEARANCE UR: CLEAR
BACTERIA URNS QL MICRO: ABNORMAL /HPF
BILIRUB UR QL: NEGATIVE
COLOR UR: ABNORMAL
EPITH CASTS URNS QL MICRO: ABNORMAL /LPF (ref 0–5)
GLUCOSE UR STRIP.AUTO-MCNC: NEGATIVE MG/DL
HGB UR QL STRIP: NEGATIVE
KETONES UR QL STRIP.AUTO: NEGATIVE MG/DL
LEUKOCYTE ESTERASE UR QL STRIP.AUTO: NEGATIVE
NITRITE UR QL STRIP.AUTO: POSITIVE
PH UR STRIP: 5.5 [PH] (ref 5–8)
PROT UR STRIP-MCNC: NEGATIVE MG/DL
RBC #/AREA URNS HPF: NEGATIVE /HPF (ref 0–5)
SP GR UR REFRACTOMETRY: 1.01 (ref 1–1.03)
UROBILINOGEN UR QL STRIP.AUTO: 0.2 EU/DL (ref 0.2–1)
WBC URNS QL MICRO: ABNORMAL /HPF (ref 0–4)

## 2019-05-27 PROCEDURE — 81001 URINALYSIS AUTO W/SCOPE: CPT

## 2019-05-27 PROCEDURE — 99283 EMERGENCY DEPT VISIT LOW MDM: CPT

## 2019-05-27 PROCEDURE — 74011250637 HC RX REV CODE- 250/637: Performed by: EMERGENCY MEDICINE

## 2019-05-27 RX ORDER — CEPHALEXIN 250 MG/1
500 CAPSULE ORAL
Status: COMPLETED | OUTPATIENT
Start: 2019-05-27 | End: 2019-05-27

## 2019-05-27 RX ORDER — CEPHALEXIN 500 MG/1
500 CAPSULE ORAL 4 TIMES DAILY
Qty: 28 CAP | Refills: 0 | Status: SHIPPED | OUTPATIENT
Start: 2019-05-27 | End: 2019-06-03

## 2019-05-27 RX ADMIN — CEPHALEXIN 500 MG: 250 CAPSULE ORAL at 05:54

## 2019-05-27 NOTE — DISCHARGE INSTRUCTIONS
Patient Education        Urinary Tract Infection in Women: Care Instructions  Your Care Instructions    A urinary tract infection, or UTI, is a general term for an infection anywhere between the kidneys and the urethra (where urine comes out). Most UTIs are bladder infections. They often cause pain or burning when you urinate. UTIs are caused by bacteria and can be cured with antibiotics. Be sure to complete your treatment so that the infection goes away. Follow-up care is a key part of your treatment and safety. Be sure to make and go to all appointments, and call your doctor if you are having problems. It's also a good idea to know your test results and keep a list of the medicines you take. How can you care for yourself at home? · Take your antibiotics as directed. Do not stop taking them just because you feel better. You need to take the full course of antibiotics. · Drink extra water and other fluids for the next day or two. This may help wash out the bacteria that are causing the infection. (If you have kidney, heart, or liver disease and have to limit fluids, talk with your doctor before you increase your fluid intake.)  · Avoid drinks that are carbonated or have caffeine. They can irritate the bladder. · Urinate often. Try to empty your bladder each time. · To relieve pain, take a hot bath or lay a heating pad set on low over your lower belly or genital area. Never go to sleep with a heating pad in place. To prevent UTIs  · Drink plenty of water each day. This helps you urinate often, which clears bacteria from your system. (If you have kidney, heart, or liver disease and have to limit fluids, talk with your doctor before you increase your fluid intake.)  · Urinate when you need to. · Urinate right after you have sex. · Change sanitary pads often. · Avoid douches, bubble baths, feminine hygiene sprays, and other feminine hygiene products that have deodorants.   · After going to the bathroom, wipe from front to back. When should you call for help? Call your doctor now or seek immediate medical care if:    · Symptoms such as fever, chills, nausea, or vomiting get worse or appear for the first time.     · You have new pain in your back just below your rib cage. This is called flank pain.     · There is new blood or pus in your urine.     · You have any problems with your antibiotic medicine.    Watch closely for changes in your health, and be sure to contact your doctor if:    · You are not getting better after taking an antibiotic for 2 days.     · Your symptoms go away but then come back. Where can you learn more? Go to http://diego-beatriz.info/. Enter N849 in the search box to learn more about \"Urinary Tract Infection in Women: Care Instructions. \"  Current as of: March 20, 2018  Content Version: 11.9  © 0242-3585 [employer]. Care instructions adapted under license by [employer] (which disclaims liability or warranty for this information). If you have questions about a medical condition or this instruction, always ask your healthcare professional. [place] [address] any warranty or liability for your use of this information. Patient Education        Constipation: Care Instructions  Your Care Instructions    Constipation means that you have a hard time passing stools (bowel movements). People pass stools from 3 times a day to once every 3 days. What is normal for you may be different. Constipation may occur with pain in the rectum and cramping. The pain may get worse when you try to pass stools. Sometimes there are small amounts of bright red blood on toilet paper or the surface of stools. This is because of enlarged veins near the rectum (hemorrhoids). A few changes in your diet and lifestyle may help you avoid ongoing constipation. Your doctor may also prescribe medicine to help loosen your stool.   Some medicines can cause constipation. These include pain medicines and antidepressants. Tell your doctor about all the medicines you take. Your doctor may want to make a medicine change to ease your symptoms. Follow-up care is a key part of your treatment and safety. Be sure to make and go to all appointments, and call your doctor if you are having problems. It's also a good idea to know your test results and keep a list of the medicines you take. How can you care for yourself at home? · Drink plenty of fluids, enough so that your urine is light yellow or clear like water. If you have kidney, heart, or liver disease and have to limit fluids, talk with your doctor before you increase the amount of fluids you drink. · Include high-fiber foods in your diet each day. These include fruits, vegetables, beans, and whole grains. · Get at least 30 minutes of exercise on most days of the week. Walking is a good choice. You also may want to do other activities, such as running, swimming, cycling, or playing tennis or team sports. · Take a fiber supplement, such as Citrucel or Metamucil, every day. Read and follow all instructions on the label. · Schedule time each day for a bowel movement. A daily routine may help. Take your time having your bowel movement. · Support your feet with a small step stool when you sit on the toilet. This helps flex your hips and places your pelvis in a squatting position. · Your doctor may recommend an over-the-counter laxative to relieve your constipation. Examples are Milk of Magnesia and MiraLax. Read and follow all instructions on the label. Do not use laxatives on a long-term basis. When should you call for help?   Call your doctor now or seek immediate medical care if:    · You have new or worse belly pain.     · You have new or worse nausea or vomiting.     · You have blood in your stools.    Watch closely for changes in your health, and be sure to contact your doctor if:    · Your constipation is getting worse.     · You do not get better as expected. Where can you learn more? Go to http://diego-beatriz.info/. Enter 21  in the search box to learn more about \"Constipation: Care Instructions. \"  Current as of: September 23, 2018  Content Version: 11.9  © 7182-3133 [employer], [employer]). Care instructions adapted under license by [employer] (which disclaims liability or warranty for this information). If you have questions about a medical condition or this instruction, always ask your healthcare professional. [address] any warranty or liability for your use of this information.

## 2019-05-27 NOTE — ED PROVIDER NOTES
80 yo AAF with PMHx HTN presents with a few days of constipation and dysuria. Pt states that she had tooth pulled and was taking some pain medication, which made her constipated. Pt took some mag citrate and had bowel movement but now again constipated, states she had not had bowel movement in several days. Pt states she is also having urinary frequency but only can pee small amounts at a time and has some dysuria. Pt went to urgent care and reportedly had negative UA but given some azo without full relief. No fevers, no vomiting.            Past Medical History:   Diagnosis Date    Asthma     Hypertension     Migraine     Sarcoidosis     Sickle cell anemia ([facility] [address] 1985       Past Surgical History:   Procedure Laterality Date    HX COLONOSCOPY  2013    HX GYN      Hysterectomy    HX HEMORRHOIDECTOMY      HX HIP REPLACEMENT Bilateral 1996 and 2000    HX KNEE REPLACEMENT Right 03/17/2016    HX ORTHOPAEDIC      HX RETINAL DETACHMENT REPAIR Left 1995         Family History:   Problem Relation Age of Onset    Diabetes Mother     Hypertension Mother     Cancer Father     Heart Disease Father     Sickle Cell Anemia Sister     Sickle Cell Anemia Brother     Hypertension Sister        Social History     Socioeconomic History    Marital status:      Spouse name: Not on file    Number of children: Not on file    Years of education: Not on file    Highest education level: Not on file   Occupational History    Not on file   Social Needs    Financial resource strain: Not on file    Food insecurity:     Worry: Not on file     Inability: Not on file    Transportation needs:     Medical: Not on file     Non-medical: Not on file   Tobacco Use    Smoking status: Never Smoker    Smokeless tobacco: Never Used   Substance and Sexual Activity    Alcohol use: No    Drug use: Not on file    Sexual activity: Yes   Lifestyle    Physical activity:     Days per week: Not on file     Minutes per session: Not on file    Stress: Not on file   Relationships    Social connections:     Talks on phone: Not on file     Gets together: Not on file     Attends Synagogue service: Not on file     Active member of club or organization: Not on file     Attends meetings of clubs or organizations: Not on file     Relationship status: Not on file    Intimate partner violence:     Fear of current or ex partner: Not on file     Emotionally abused: Not on file     Physically abused: Not on file     Forced sexual activity: Not on file   Other Topics Concern    Not on file   Social History Narrative    Not on file         ALLERGIES: Codeine and Penicillin g    Review of Systems   Constitutional: Negative for fever. HENT: Negative for trouble swallowing. Respiratory: Negative for shortness of breath. Cardiovascular: Negative for chest pain. Gastrointestinal: Positive for abdominal pain and constipation. Negative for diarrhea and vomiting. Genitourinary: Positive for dysuria and frequency. Negative for difficulty urinating. Musculoskeletal: Negative for back pain and neck pain. Skin: Negative for wound. Neurological: Negative for syncope. Psychiatric/Behavioral: Negative for behavioral problems. All other systems reviewed and are negative. Vitals:    05/27/19 0237   BP: (!) 158/96   Pulse: 84   Resp: 16   Temp: 98.5 °F (36.9 °C)   SpO2: 97%   Weight: 98.4 kg (217 lb)   Height: 5' 4\" (1.626 m)            Physical Exam   Constitutional: She is oriented to person, place, and time. She appears well-developed. No distress. Generally well-appearing, nad   HENT:   Head: Normocephalic and atraumatic. Eyes: EOM are normal.   Neck: Normal range of motion. Cardiovascular: Normal rate and intact distal pulses. Pulmonary/Chest: Effort normal and breath sounds normal. No respiratory distress. Abdominal: Soft. There is no tenderness. Musculoskeletal: Normal range of motion.    Mechanically stable   Neurological: She is alert and oriented to person, place, and time. No focal deficits noted   Skin: Skin is warm. Psychiatric: Her behavior is normal.   Nursing note and vitals reviewed. MDM  Number of Diagnoses or Management Options  Acute cystitis without hematuria:   Drug-induced constipation:   Diagnosis management comments: 80 yo AAF with no relevant PMHx presents with several days constipation and difficulty urinating. No fevers, no vomiting. Examination unremarkable with no apparent abdominal tenderness. UA suggestive of uti, will treat empirically. Discussed results and poc for dc home, abx - first dose in ED, symptom management, trial of miralax, follow-up, return precautions. Amount and/or Complexity of Data Reviewed  Clinical lab tests: ordered and reviewed  Review and summarize past medical records: yes    Patient Progress  Patient progress: stable         Procedures    PROGRESS NOTES    5:48 AM:   Marcelo Herron MD arrives to the bedside to evaluate the patient. Answered the patient's questions regarding the treatment plan.       CONSULTATIONS  None      MEDICATIONS ORDERED  Medications   cephALEXin (KEFLEX) capsule 500 mg (has no administration in time range)       RADIOLOGY INTERPRETATIONS  No orders to display       EKG READINGS/LABORATORY RESULTS  Recent Results (from the past 12 hour(s))   URINALYSIS W/ RFLX MICROSCOPIC    Collection Time: 05/27/19  3:43 AM   Result Value Ref Range    Color DARK YELLOW      Appearance CLEAR      Specific gravity 1.009 1.005 - 1.030      pH (UA) 5.5 5.0 - 8.0      Protein NEGATIVE  NEG mg/dL    Glucose NEGATIVE  NEG mg/dL    Ketone NEGATIVE  NEG mg/dL    Bilirubin NEGATIVE  NEG      Blood NEGATIVE  NEG      Urobilinogen 0.2 0.2 - 1.0 EU/dL    Nitrites POSITIVE (A) NEG      Leukocyte Esterase NEGATIVE  NEG     URINE MICROSCOPIC ONLY    Collection Time: 05/27/19  3:43 AM   Result Value Ref Range    WBC 0 to 3 0 - 4 /hpf    RBC NEGATIVE  0 - 5 /hpf    Epithelial cells FEW 0 - 5 /lpf    Bacteria FEW (A) NEG /hpf       ED DIAGNOSIS & DISPOSITION INFORMATION  Diagnosis:   1. Acute cystitis without hematuria    2. Drug-induced constipation        Disposition: discharged    Follow-up Information     Follow up With Specialties Details Why Contact Info    Yasmani Hill MD Internal Medicine Schedule an appointment as soon as possible for a visit  [phone] [facility] [id number] [phone]      [facility] EMERGENCY DEPT Emergency Medicine  As needed [address]  [phone]          Patient's Medications   Start Taking    CEPHALEXIN (KEFLEX) 500 MG CAPSULE    Take 1 Cap by mouth four (4) times daily for 7 days. Continue Taking    ACETAMINOPHEN (TYLENOL EXTRA STRENGTH) 500 MG TABLET    Take 500 mg by mouth every four (4) hours as needed for Pain. ALBUTEROL (PROVENTIL HFA, VENTOLIN HFA, PROAIR HFA) 90 MCG/ACTUATION INHALER    Take 2 Puffs by inhalation every six (6) hours as needed for Wheezing. ALUMINUM HYDROX-MAGNESIUM CARB (GAVISCON)  MG/15 ML SUSPENSION    Take 15 mL by mouth three (3) times daily. AZELASTINE (ASTELIN) 137 MCG (0.1 %) NASAL SPRAY    1 [place] by Both Nostrils route two (2) times a day. Use in each nostril as directed    BRIMONIDINE-TIMOLOL (COMBIGAN) 0.2-0.5 % DROP OPHTHALMIC SOLUTION    Administer 1 Drop to left eye daily. BUDESONIDE-FORMOTEROL (SYMBICORT) 80-4.5 MCG/ACTUATION HFAA    Take 2 Puffs by inhalation two (2) times a day. Use for 2 weeks at a time. CALCIUM-CHOLECALCIFEROL, D3, (CALCIUM 600 + D) 600-125 MG-UNIT TAB    Take 600 mg by mouth daily. CETIRIZINE (ZYRTEC) 10 MG CAP    Take 10 mg by mouth as needed. DOCUSATE SODIUM (STOOL SOFTENER) 100 MG CAPSULE    Take 100 mg by mouth daily. OMEPRAZOLE (PRILOSEC) 20 MG CAPSULE    Take 20 mg by mouth daily. PEAK FLOW METER LOU    1 Units by Does Not Apply route daily.     RANITIDINE (ZANTAC) 150 MG TABLET Take  by mouth as needed for Indigestion. SODIUM CHLORIDE (AYR SALINE) 0.65 % NASAL SPRAY    1 [place] as needed for Congestion. TIZANIDINE (ZANAFLEX) 2 MG TABLET    Take 1 tablet 3 times a day as needed    VALSARTAN-HYDROCHLOROTHIAZIDE (DIOVAN-HCT) 320-25 MG PER TABLET    Take 1 Tab by mouth daily.     XARELTO 20 MG TAB TABLET    take 1 tablet by mouth once daily WITH BREAKFAST   These Medications have changed    No medications on file   Stop Taking    No medications on file           Frances Johansen MD.

## 2019-05-27 NOTE — ED NOTES
6:01 AM  05/27/19     Discharge instructions given to patient (name) with verbalization of understanding. Patient accompanied by spouse. Patient discharged with the following prescriptions Keflex. Patient discharged to home (destination).       Nakul Ribera RN

## 2019-05-27 NOTE — ED TRIAGE NOTES
Pt ambulatory into triage, very tearful for c/o urinary retention, constipation (LBM 3 days ago), abdominal pain and nausea without vomiting x1 week. States she went to urgent care Thursday for treatment and her culture was negative, was given pyridium, and her symptoms have become worse since then.

## 2019-05-29 ENCOUNTER — OFFICE VISIT (OUTPATIENT)
Dept: FAMILY MEDICINE CLINIC | Age: 69
End: 2019-05-29

## 2019-05-29 ENCOUNTER — HOSPITAL ENCOUNTER (OUTPATIENT)
Dept: LAB | Age: 69
Discharge: HOME | End: 2019-05-29
Payer: MEDICARE

## 2019-05-29 VITALS
OXYGEN SATURATION: 99 % | DIASTOLIC BLOOD PRESSURE: 96 MMHG | RESPIRATION RATE: 16 BRPM | BODY MASS INDEX: 36.54 KG/M2 | WEIGHT: 214 LBS | HEIGHT: 64 IN | HEART RATE: 79 BPM | SYSTOLIC BLOOD PRESSURE: 135 MMHG | TEMPERATURE: 97.5 F

## 2019-05-29 DIAGNOSIS — N13.9 URINARY OBSTRUCTION: Primary | ICD-10-CM

## 2019-05-29 DIAGNOSIS — R10.2 SUPRAPUBIC PAIN, ACUTE: ICD-10-CM

## 2019-05-29 DIAGNOSIS — N13.9 URINARY OBSTRUCTION: ICD-10-CM

## 2019-05-29 LAB
ANION GAP SERPL CALC-SCNC: 10 MMOL/L (ref 3–18)
BASOPHILS # BLD: 0 K/UL (ref 0–0.1)
BASOPHILS NFR BLD: 0 % (ref 0–2)
BUN SERPL-MCNC: 19 MG/DL (ref 7–18)
BUN/CREAT SERPL: 16 (ref 12–20)
CALCIUM SERPL-MCNC: 9 MG/DL (ref 8.5–10.1)
CHLORIDE SERPL-SCNC: 102 MMOL/L (ref 100–108)
CO2 SERPL-SCNC: 26 MMOL/L (ref 21–32)
CREAT SERPL-MCNC: 1.18 MG/DL (ref 0.6–1.3)
DIFFERENTIAL METHOD BLD: ABNORMAL
EOSINOPHIL # BLD: 0.1 K/UL (ref 0–0.4)
EOSINOPHIL NFR BLD: 2 % (ref 0–5)
ERYTHROCYTE [DISTWIDTH] IN BLOOD BY AUTOMATED COUNT: 15 % (ref 11.6–14.5)
GLUCOSE SERPL-MCNC: 92 MG/DL (ref 74–99)
HCT VFR BLD AUTO: 30.1 % (ref 35–45)
HGB BLD-MCNC: 9.4 G/DL (ref 12–16)
LYMPHOCYTES # BLD: 1.5 K/UL (ref 0.9–3.6)
LYMPHOCYTES NFR BLD: 18 % (ref 21–52)
MCH RBC QN AUTO: 21.8 PG (ref 24–34)
MCHC RBC AUTO-ENTMCNC: 31.2 G/DL (ref 31–37)
MCV RBC AUTO: 69.8 FL (ref 74–97)
MONOCYTES # BLD: 0.7 K/UL (ref 0.05–1.2)
MONOCYTES NFR BLD: 8 % (ref 3–10)
NEUTS SEG # BLD: 6.1 K/UL (ref 1.8–8)
NEUTS SEG NFR BLD: 72 % (ref 40–73)
PLATELET # BLD AUTO: 192 K/UL (ref 135–420)
PMV BLD AUTO: 9.5 FL (ref 9.2–11.8)
POTASSIUM SERPL-SCNC: 4.6 MMOL/L (ref 3.5–5.5)
RBC # BLD AUTO: 4.31 M/UL (ref 4.2–5.3)
SODIUM SERPL-SCNC: 138 MMOL/L (ref 136–145)
WBC # BLD AUTO: 8.5 K/UL (ref 4.6–13.2)

## 2019-05-29 PROCEDURE — 36415 COLL VENOUS BLD VENIPUNCTURE: CPT

## 2019-05-29 PROCEDURE — 85025 COMPLETE CBC W/AUTO DIFF WBC: CPT

## 2019-05-29 PROCEDURE — 80048 BASIC METABOLIC PNL TOTAL CA: CPT

## 2019-05-29 NOTE — PROGRESS NOTES
Lora Charlton is a 79 y.o. female and presents with ED Follow-up (went to [place] [address] ER on 5/27/2019 for UTI); Pelvic Pain; Urgency; Urinary Burning; and Palpitations       Subjective:    Unable to urinate for last 10 days worsening sx-started after dental procedure and took several doses of narcotic, no anesthesia given she reports- was having bladder pressure and went to [facility] care [place]- given pyridium- culture done but negative per pt. Has also been constipated but relieved this with mag citrate. Went to ER 5/27/19 and given keflex for possible uti. When attempting to urinate - only a small amount comes out. No hematuria. No f/c. No n/v. no back pain.   htn- has not been taking meds due to pain. Assessment/Plan:    Suprapubic pain- suspect bladder obstruction/dysfunction. Will refer to urology for urgent evaluation. Urine was mildly suspicious for UTI only and pt unable to void today. Will also draw labs today. Diagnoses and all orders for this visit:    1. Urinary obstruction  -     METABOLIC PANEL, BASIC; Future  -     CBC WITH AUTOMATED DIFF; Future  -     REFERRAL TO UROLOGY    2. Suprapubic pain, acute  -     METABOLIC PANEL, BASIC; Future  -     CBC WITH AUTOMATED DIFF; Future  -     REFERRAL TO UROLOGY          Orders Placed This Encounter    METABOLIC PANEL, BASIC     Standing Status:   Future     Standing Expiration Date:   5/29/2020    CBC WITH AUTOMATED DIFF     Standing Status:   Future     Standing Expiration Date:   5/29/2020    REFERRAL TO UROLOGY     Referral Priority:   Urgent     Referral Type:   Consultation     Referral Reason:   Specialty Services Required     Number of Visits Requested:   1           ROS:  Negative except as mentioned above  Cardiac- no chest pain or palpitations  Pulmonary- no sob or wheezes  GI- no n/v or diarrhea.       SH:  Social History     Tobacco Use    Smoking status: Never Smoker    Smokeless tobacco: Never Used   Substance Use Topics    Alcohol use: No    Drug use: Not on file         Medications/Allergies:  Current Outpatient Medications on File Prior to Visit   Medication Sig Dispense Refill    cephALEXin (KEFLEX) 500 mg capsule Take 1 Cap by mouth four (4) times daily for 7 days. 28 Cap 0    valsartan-hydroCHLOROthiazide (DIOVAN-HCT) 320-25 mg per tablet Take 1 Tab by mouth daily. 90 Tab 3    XARELTO 20 mg tab tablet take 1 tablet by mouth once daily WITH BREAKFAST 90 Tab 3    azelastine (ASTELIN) 137 mcg (0.1 %) nasal spray 1 [place] by Both Nostrils route two (2) times a day. Use in each nostril as directed 1 Bottle 0    budesonide-formoterol (SYMBICORT) 80-4.5 mcg/actuation HFAA Take 2 Puffs by inhalation two (2) times a day. Use for 2 weeks at a time. 1 Inhaler 2    albuterol (PROVENTIL HFA, VENTOLIN HFA, PROAIR HFA) 90 mcg/actuation inhaler Take 2 Puffs by inhalation every six (6) hours as needed for Wheezing. 1 Inhaler 2    omeprazole (PRILOSEC) 20 mg capsule Take 20 mg by mouth daily.  aluminum hydrox-magnesium carb (GAVISCON)  mg/15 mL suspension Take 15 mL by mouth three (3) times daily.  Peak Flow Meter lanette 1 Units by Does Not Apply route daily. 1 Device 0    tiZANidine (ZANAFLEX) 2 mg tablet Take 1 tablet 3 times a day as needed 30 Tab 0    acetaminophen (TYLENOL EXTRA STRENGTH) 500 mg tablet Take 500 mg by mouth every four (4) hours as needed for Pain.  brimonidine-timolol (COMBIGAN) 0.2-0.5 % drop ophthalmic solution Administer 1 Drop to left eye daily.  ranitidine (ZANTAC) 150 mg tablet Take  by mouth as needed for Indigestion.  Cetirizine (ZYRTEC) 10 mg cap Take 10 mg by mouth as needed.  calcium-cholecalciferol, d3, (CALCIUM 600 + D) 600-125 mg-unit tab Take 600 mg by mouth daily.  docusate sodium (STOOL SOFTENER) 100 mg capsule Take 100 mg by mouth daily.  sodium chloride (AYR SALINE) 0.65 % nasal spray 1 Spray as needed for Congestion.        No current facility-administered medications on file prior to visit. Allergies   Allergen Reactions    Codeine Itching    Penicillin G Hives       Objective:  Visit Vitals  BP (!) 135/96   Pulse 79   Temp 97.5 °F (36.4 °C) (Oral)   Resp 16   Ht 5' 4\" (1.626 m)   Wt 214 lb (97.1 kg)   SpO2 99%   BMI 36.73 kg/m²    Body mass index is 36.73 kg/m². Constitutional: Well developed, nourished, significant distress, alert   CV: S1, S2.  RRR. No murmurs/rubs. No edema. Pulm: No abnormalities on inspection. Chronic sl wheeze unchanged. Normal effort. GI: Soft, + tenderness over suprapubic area but no peritoneal signs. Unable to clearly feel distended bladder but exam limited by obesity, nondistended. Normal active bowel sounds. No  masses on palpation. No hepatosplenomegaly.

## 2019-05-29 NOTE — PROGRESS NOTES
1. Have you been to the ER, urgent care clinic since your last visit? Hospitalized since your last visit? Yes, went to [facility] ER in last week. 2. Have you seen or consulted any other health care providers outside of the [address] [place] [address] since your last visit? Include any pap smears or colon screening. No.     Chief Complaint   Patient presents with    ED Follow-up     went to [place] [address] ER on 5/27/2019 for UTI    Pelvic Pain    Urgency    Urinary Burning    Palpitations     She was taking Keflex 500mg 4 times a day but stop taking it due to palpitations and shaking.

## 2019-06-13 ENCOUNTER — HOSPITAL ENCOUNTER (OUTPATIENT)
Dept: LAB | Age: 69
Discharge: HOME | End: 2019-06-13
Payer: MEDICARE

## 2019-06-13 DIAGNOSIS — I10 ESSENTIAL HYPERTENSION: ICD-10-CM

## 2019-06-13 DIAGNOSIS — E55.9 VITAMIN D DEFICIENCY: ICD-10-CM

## 2019-06-13 DIAGNOSIS — N18.30 CKD (CHRONIC KIDNEY DISEASE) STAGE 3, GFR 30-59 ML/MIN (HCC): ICD-10-CM

## 2019-06-13 LAB
ANION GAP SERPL CALC-SCNC: 9 MMOL/L (ref 3–18)
BUN SERPL-MCNC: 28 MG/DL (ref 7–18)
BUN/CREAT SERPL: 20 (ref 12–20)
CALCIUM SERPL-MCNC: 9 MG/DL (ref 8.5–10.1)
CHLORIDE SERPL-SCNC: 107 MMOL/L (ref 100–108)
CO2 SERPL-SCNC: 24 MMOL/L (ref 21–32)
CREAT SERPL-MCNC: 1.37 MG/DL (ref 0.6–1.3)
ERYTHROCYTE [DISTWIDTH] IN BLOOD BY AUTOMATED COUNT: 15.5 % (ref 11.6–14.5)
GLUCOSE SERPL-MCNC: 99 MG/DL (ref 74–99)
HCT VFR BLD AUTO: 29.7 % (ref 35–45)
HGB BLD-MCNC: 9.4 G/DL (ref 12–16)
MCH RBC QN AUTO: 22 PG (ref 24–34)
MCHC RBC AUTO-ENTMCNC: 31.6 G/DL (ref 31–37)
MCV RBC AUTO: 69.4 FL (ref 74–97)
PLATELET # BLD AUTO: 205 K/UL (ref 135–420)
PMV BLD AUTO: 10.1 FL (ref 9.2–11.8)
POTASSIUM SERPL-SCNC: 5 MMOL/L (ref 3.5–5.5)
RBC # BLD AUTO: 4.28 M/UL (ref 4.2–5.3)
SODIUM SERPL-SCNC: 140 MMOL/L (ref 136–145)
WBC # BLD AUTO: 7.9 K/UL (ref 4.6–13.2)

## 2019-06-13 PROCEDURE — 80048 BASIC METABOLIC PNL TOTAL CA: CPT

## 2019-06-13 PROCEDURE — 85027 COMPLETE CBC AUTOMATED: CPT

## 2019-06-13 PROCEDURE — 82306 VITAMIN D 25 HYDROXY: CPT

## 2019-06-13 PROCEDURE — 36415 COLL VENOUS BLD VENIPUNCTURE: CPT

## 2019-06-14 ENCOUNTER — OFFICE VISIT (OUTPATIENT)
Dept: FAMILY MEDICINE CLINIC | Age: 69
End: 2019-06-14

## 2019-06-14 VITALS
TEMPERATURE: 97 F | HEIGHT: 64 IN | HEART RATE: 68 BPM | WEIGHT: 209 LBS | SYSTOLIC BLOOD PRESSURE: 164 MMHG | BODY MASS INDEX: 35.68 KG/M2 | RESPIRATION RATE: 16 BRPM | DIASTOLIC BLOOD PRESSURE: 78 MMHG | OXYGEN SATURATION: 100 %

## 2019-06-14 DIAGNOSIS — R10.9 CONTINUOUS SEVERE ABDOMINAL PAIN: Primary | ICD-10-CM

## 2019-06-14 LAB — 25(OH)D3 SERPL-MCNC: 15.5 NG/ML (ref 30–100)

## 2019-06-14 NOTE — PATIENT INSTRUCTIONS

## 2019-06-14 NOTE — PROGRESS NOTES
Tato Sparks is a 79 y.o. female and presents with Pelvic Pain (More like Pelvic pressure.); Other (have trouble moving her bowel. Constipation and have not had a complete bowel movement x1 month.); and Side Pain (right side)       Subjective:    Having severe suprapubic pain and pressure. Having urinary frequency and   No f/c. No n/v.   Urine feels \"hot\". No hematuria. Urine culture was negative  No chest pain or shortness of breath. No diarrhea but has some constipation she feels. No melena or hematochezia. No back pain. No radiation. No clear exacerbating or alleviating    Assessment/Plan:      Severe suprapubic pain-unclear etiology but patient appears to be in severe pain and crying in office today. Will do stat CT scan to rule out any structural abnormalities. If pain continues at the severity levels she was asked to go to the emergency room otherwise will attempt to get patient into see urology again barring any other new symptoms    Diagnoses and all orders for this visit:    1. Continuous severe abdominal pain  -     CT ABD PELV W WO CONT; Future    Other orders  -     cholecalciferol, vitamin D3, (VITAMIN D3) 50,000 unit tablet; Take 1 Tab by mouth every seven (7) days. Take for 12 weeks. RTC in 1 week  Orders Placed This Encounter    CT ABD PELV W WO CONT     Standing Status:   Future     Standing Expiration Date:   7/14/2020     Scheduling Instructions:      Pt request Noemi     Order Specific Question:   Is Patient Allergic to Contrast Dye? Answer:   No     Order Specific Question:   STAT Creatinine as indicated     Answer:   Yes     Order Specific Question: This order utilizes IV contrast.  What additional contrast is needed? Answer:   Per Radiologist Protocol    cholecalciferol, vitamin D3, (VITAMIN D3) 50,000 unit tablet     Sig: Take 1 Tab by mouth every seven (7) days. Take for 12 weeks.      Dispense:  12 Tab     Refill:  1           ROS:  Negative except as mentioned above  Cardiac- no chest pain or palpitations  Pulmonary- no sob or wheezes  GI- no n/v or diarrhea. SH:  Social History     Tobacco Use    Smoking status: Never Smoker    Smokeless tobacco: Never Used   Substance Use Topics    Alcohol use: No    Drug use: Not on file         Medications/Allergies:  Current Outpatient Medications on File Prior to Visit   Medication Sig Dispense Refill    valsartan-hydroCHLOROthiazide (DIOVAN-HCT) 160-12.5 mg per tablet Take 1 Tab by mouth daily.  phenazopyridine (PYRIDIUM) 200 mg tablet take 1 tablet by mouth twice a day if needed  0    XARELTO 20 mg tab tablet take 1 tablet by mouth once daily WITH BREAKFAST 90 Tab 3    azelastine (ASTELIN) 137 mcg (0.1 %) nasal spray 1 [place] by Both Nostrils route two (2) times a day. Use in each nostril as directed 1 Bottle 0    budesonide-formoterol (SYMBICORT) 80-4.5 mcg/actuation HFAA Take 2 Puffs by inhalation two (2) times a day. Use for 2 weeks at a time. 1 Inhaler 2    albuterol (PROVENTIL HFA, VENTOLIN HFA, PROAIR HFA) 90 mcg/actuation inhaler Take 2 Puffs by inhalation every six (6) hours as needed for Wheezing. 1 Inhaler 2    omeprazole (PRILOSEC) 20 mg capsule Take 20 mg by mouth daily.  aluminum hydrox-magnesium carb (GAVISCON)  mg/15 mL suspension Take 15 mL by mouth three (3) times daily.  Peak Flow Meter lanette 1 Units by Does Not Apply route daily. 1 Device 0    tiZANidine (ZANAFLEX) 2 mg tablet Take 1 tablet 3 times a day as needed 30 Tab 0    acetaminophen (TYLENOL EXTRA STRENGTH) 500 mg tablet Take 500 mg by mouth every four (4) hours as needed for Pain.  brimonidine-timolol (COMBIGAN) 0.2-0.5 % drop ophthalmic solution Administer 1 Drop to left eye daily.  ranitidine (ZANTAC) 150 mg tablet Take  by mouth as needed for Indigestion.  Cetirizine (ZYRTEC) 10 mg cap Take 10 mg by mouth as needed.       calcium-cholecalciferol, d3, (CALCIUM 600 + D) 600-125 mg-unit tab Take 600 mg by mouth daily.  docusate sodium (STOOL SOFTENER) 100 mg capsule Take 100 mg by mouth daily.  sodium chloride (AYR SALINE) 0.65 % nasal spray 1 Spray as needed for Congestion. No current facility-administered medications on file prior to visit. Allergies   Allergen Reactions    Codeine Itching    Penicillin G Hives    Sulfa (Sulfonamide Antibiotics) Other (comments)     Exacerbates her Sickle Cell. Objective:  Visit Vitals  BP (!) 193/92   Pulse (!) 116   Temp 97 °F (36.1 °C) (Oral)   Resp 16   Ht 5' 4\" (1.626 m)   Wt 209 lb (94.8 kg)   SpO2 100%   BMI 35.87 kg/m²    Body mass index is 35.87 kg/m². Constitutional: Well developed, nourished, significant distress and crying due to abdominal pain, alert,    HENT: Exterior ears and tympanic membranes normal bilaterally. Supple neck. No thyromegaly or lymphadenopathy. Oropharynx clear and moist mucous membranes. Eyes: Conjunctiva normal. PERRL. CV: S1, S2.  RRR. No murmurs/rubs. No thrills palpated. No carotid bruits. Intact distal pulses. No edema. Pulm: No abnormalities on inspection. Clear to auscultation bilaterally. No wheezing/rhonchi. Normal effort. GI: Soft, suprapubic tenderness, nondistended. Normal active bowel sounds. No  masses on palpation. No hepatosplenomegaly.   No rebound

## 2019-06-14 NOTE — PROGRESS NOTES
1. Have you been to the ER, urgent care clinic since your last visit? Hospitalized since your last visit? No.     2. Have you seen or consulted any other health care providers outside of the [address] [place] [address] since your last visit? Include any pap smears or colon screening. No.    Chief Complaint   Patient presents with    Pelvic Pain     More like Pelvic pressure.  Other     have trouble moving her bowel. Constipation and have not had a complete bowel movement x1 month.  Side Pain     right side     Her pelvic pain/pressure wakes her up at night.

## 2019-06-17 ENCOUNTER — DOCUMENTATION ONLY (OUTPATIENT)
Dept: FAMILY MEDICINE CLINIC | Age: 69
End: 2019-06-17

## 2019-06-17 NOTE — PROGRESS NOTES
I spoke to patient and per Dr. Swathi Crawford her CAT scan Abd.pelvis didn't showe source of her pain. She has an appointment to see Dr. Swathi Crawford this coming Thursday 6/20/2019 for follow up.  She is aware to go to the ER if the pelvic pain is worst.

## 2019-06-20 ENCOUNTER — OFFICE VISIT (OUTPATIENT)
Dept: FAMILY MEDICINE CLINIC | Age: 69
End: 2019-06-20

## 2019-06-20 ENCOUNTER — HOSPITAL ENCOUNTER (OUTPATIENT)
Dept: GENERAL RADIOLOGY | Age: 69
Discharge: HOME | End: 2019-06-20
Attending: INTERNAL MEDICINE
Payer: MEDICARE

## 2019-06-20 VITALS
WEIGHT: 207 LBS | HEIGHT: 64 IN | TEMPERATURE: 97.3 F | DIASTOLIC BLOOD PRESSURE: 66 MMHG | HEART RATE: 74 BPM | SYSTOLIC BLOOD PRESSURE: 144 MMHG | BODY MASS INDEX: 35.34 KG/M2 | RESPIRATION RATE: 16 BRPM | OXYGEN SATURATION: 98 %

## 2019-06-20 DIAGNOSIS — R10.30 LOWER ABDOMINAL PAIN: ICD-10-CM

## 2019-06-20 DIAGNOSIS — M85.80 OSTEOPENIA, UNSPECIFIED LOCATION: ICD-10-CM

## 2019-06-20 DIAGNOSIS — K59.00 CONSTIPATION, UNSPECIFIED CONSTIPATION TYPE: ICD-10-CM

## 2019-06-20 DIAGNOSIS — K86.1 CHRONIC PANCREATITIS, UNSPECIFIED PANCREATITIS TYPE (HCC): Primary | ICD-10-CM

## 2019-06-20 DIAGNOSIS — Z78.0 POSTMENOPAUSE: ICD-10-CM

## 2019-06-20 PROCEDURE — 77080 DXA BONE DENSITY AXIAL: CPT

## 2019-06-20 NOTE — PROGRESS NOTES
Nadia Polo is a 79 y.o. female and presents with Pelvic Pain (1 week follow up) and Results (CAt scan)       Subjective:    Having severe suprapubic pain and pressure. Having urinary frequency and dysuria. No f/c. No n/v.   Urine feels \"hot\". No hematuria. Urine culture was negative  No chest pain or shortness of breath. No diarrhea but has some constipation she feels. No melena or hematochezia. No back pain. No radiation. No clear exacerbating or alleviating    Assessment/Plan:      Severe suprapubic pain-unclear etiology but patient appears to be in severe pain and crying in office today. CT scan reviewed with pt-   If pain increases in severity levels she was asked to go to the emergency room otherwise will attempt to get patient into see urology again barring any other new symptoms. Chronic pancreatitis on ct and ? Of rectal thickening - discussed - pt saw GI in 2017 for these issues and is current on colonoscopy- she was asked to f/u with GI as well. Diagnoses and all orders for this visit:    1. Chronic pancreatitis, unspecified pancreatitis type ([facility] [address]  -     REFERRAL TO GASTROENTEROLOGY    2. Constipation, unspecified constipation type  -     REFERRAL TO GASTROENTEROLOGY    3. Lower abdominal pain  -     REFERRAL TO GASTROENTEROLOGY        RTC in  Orders Placed This Encounter    REFERRAL TO GASTROENTEROLOGY     Referral Priority:   Routine     Referral Type:   Consultation     Referral Reason:   Specialty Services Required     Referred to Provider:   Candi Barahona MD     Number of Visits Requested:   1           ROS:  Negative except as mentioned above  Cardiac- no chest pain or palpitations  Pulmonary- no sob or wheezes  GI- no n/v or diarrhea.         SH:  Social History     Tobacco Use    Smoking status: Never Smoker    Smokeless tobacco: Never Used   Substance Use Topics    Alcohol use: No    Drug use: Not on file         Medications/Allergies:  Current Outpatient Medications on File Prior to Visit   Medication Sig Dispense Refill    cholecalciferol, vitamin D3, (VITAMIN D3) 50,000 unit tablet Take 1 Tab by mouth every seven (7) days. Take for 12 weeks. 12 Tab 1    valsartan-hydroCHLOROthiazide (DIOVAN-HCT) 160-12.5 mg per tablet Take 1 Tab by mouth daily.  phenazopyridine (PYRIDIUM) 200 mg tablet take 1 tablet by mouth twice a day if needed  0    XARELTO 20 mg tab tablet take 1 tablet by mouth once daily WITH BREAKFAST 90 Tab 3    azelastine (ASTELIN) 137 mcg (0.1 %) nasal spray 1 [place] by Both Nostrils route two (2) times a day. Use in each nostril as directed 1 Bottle 0    budesonide-formoterol (SYMBICORT) 80-4.5 mcg/actuation HFAA Take 2 Puffs by inhalation two (2) times a day. Use for 2 weeks at a time. 1 Inhaler 2    albuterol (PROVENTIL HFA, VENTOLIN HFA, PROAIR HFA) 90 mcg/actuation inhaler Take 2 Puffs by inhalation every six (6) hours as needed for Wheezing. 1 Inhaler 2    omeprazole (PRILOSEC) 20 mg capsule Take 20 mg by mouth daily.  aluminum hydrox-magnesium carb (GAVISCON)  mg/15 mL suspension Take 15 mL by mouth three (3) times daily.  Peak Flow Meter lanette 1 Units by Does Not Apply route daily. 1 Device 0    tiZANidine (ZANAFLEX) 2 mg tablet Take 1 tablet 3 times a day as needed 30 Tab 0    acetaminophen (TYLENOL EXTRA STRENGTH) 500 mg tablet Take 500 mg by mouth every four (4) hours as needed for Pain.  brimonidine-timolol (COMBIGAN) 0.2-0.5 % drop ophthalmic solution Administer 1 Drop to left eye daily.  ranitidine (ZANTAC) 150 mg tablet Take  by mouth as needed for Indigestion.  Cetirizine (ZYRTEC) 10 mg cap Take 10 mg by mouth as needed.  calcium-cholecalciferol, d3, (CALCIUM 600 + D) 600-125 mg-unit tab Take 600 mg by mouth daily.  docusate sodium (STOOL SOFTENER) 100 mg capsule Take 100 mg by mouth daily.  sodium chloride (AYR SALINE) 0.65 % nasal spray 1 Spray as needed for Congestion.        No current facility-administered medications on file prior to visit. Allergies   Allergen Reactions    Codeine Itching    Penicillin G Hives    Sulfa (Sulfonamide Antibiotics) Other (comments)     Exacerbates her Sickle Cell. Objective:  Visit Vitals  /66   Pulse 74   Temp 97.3 °F (36.3 °C) (Oral)   Resp 16   Ht 5' 4\" (1.626 m)   Wt 207 lb (93.9 kg)   SpO2 98%   BMI 35.53 kg/m²    Body mass index is 35.53 kg/m². Constitutional: Well developed, nourished, significant distress and crying due to abdominal pain, alert,    rectal Normal tone- no clear masses- pain on anterior palpation over bladder area. Hemeoccult negative. CV: S1, S2.  RRR. No murmurs/rubs. No thrills palpated. No carotid bruits. Intact distal pulses. No edema. Pulm: No abnormalities on inspection. Clear to auscultation bilaterally. No wheezing/rhonchi. Normal effort. GI: Soft, suprapubic tenderness, nondistended. Normal active bowel sounds. No  masses on palpation. No hepatosplenomegaly.   No rebound

## 2019-06-20 NOTE — PROGRESS NOTES
1. Have you been to the ER, urgent care clinic since your last visit? Hospitalized since your last visit? No.     2. Have you seen or consulted any other health care providers outside of the [address] [place] [address] since your last visit? Include any pap smears or colon screening.  No.    Chief Complaint   Patient presents with    Pelvic Pain     1 week follow up    Results     CAt scan

## 2019-06-26 DIAGNOSIS — R10.9 CONTINUOUS SEVERE ABDOMINAL PAIN: ICD-10-CM

## 2019-12-30 PROBLEM — D86.9 SARCOIDOSIS: Status: ACTIVE | Noted: 2019-12-30

## 2019-12-30 PROBLEM — R35.1 NOCTURIA: Status: ACTIVE | Noted: 2019-12-30

## 2019-12-30 PROBLEM — N28.89 LEFT KIDNEY MASS: Status: ACTIVE | Noted: 2017-09-20

## 2019-12-30 PROBLEM — R39.198 DIFFICULTY IN MICTURITION: Status: ACTIVE | Noted: 2019-12-30

## 2019-12-30 PROBLEM — N81.6 RECTOCELE: Status: ACTIVE | Noted: 2019-12-30

## 2020-03-23 DIAGNOSIS — D86.9 SARCOIDOSIS: ICD-10-CM

## 2020-03-25 RX ORDER — RIVAROXABAN 20 MG/1
TABLET, FILM COATED ORAL
Qty: 90 TAB | Refills: 3 | Status: SHIPPED | OUTPATIENT
Start: 2020-03-25

## 2020-03-25 RX ORDER — VALSARTAN AND HYDROCHLOROTHIAZIDE 320; 25 MG/1; MG/1
TABLET, FILM COATED ORAL
Qty: 90 TAB | Refills: 3 | Status: SHIPPED | OUTPATIENT
Start: 2020-03-25 | End: 2020-09-25

## 2020-03-25 RX ORDER — ALBUTEROL SULFATE 90 UG/1
AEROSOL, METERED RESPIRATORY (INHALATION)
Qty: 18 G | Refills: 3 | Status: SHIPPED | OUTPATIENT
Start: 2020-03-25

## 2023-05-01 NOTE — PROGRESS NOTES
Subjective:   Margret Wolff is a 79 y.o. female here for an acute visit for    Chief Complaint   Patient presents with    Sinus Pain     with drainage       HPI    Onset 7 days ago   Location sinuses   Duration constant   Characteristics Pain in ear, aorund eyes, nauseated, headache, feel something go down when swallow, blow nose and nothing comes out   Aggrevating Lying down   Relieving nothing   Treatment Zyrtec, tylenol, nasal spray   Pertinent PMH none   Pertinent negatives No cough, fever     ROS  As above, the rest are negative   ROS    Current Outpatient Medications   Medication Sig Dispense Refill    azelastine (ASTELIN) 137 mcg (0.1 %) nasal spray 1 [place] by Both Nostrils route two (2) times a day. Use in each nostril as directed 1 Bottle 0    valsartan-hydroCHLOROthiazide (DIOVAN-HCT) 320-25 mg per tablet Take 1 Tab by mouth daily. 90 Tab 3    budesonide-formoterol (SYMBICORT) 80-4.5 mcg/actuation HFAA Take 2 Puffs by inhalation two (2) times a day. Use for 2 weeks at a time. 1 Inhaler 2    albuterol (PROVENTIL HFA, VENTOLIN HFA, PROAIR HFA) 90 mcg/actuation inhaler Take 2 Puffs by inhalation every six (6) hours as needed for Wheezing. 1 Inhaler 2    valsartan-hydroCHLOROthiazide (DIOVAN-HCT) 160-12.5 mg per tablet Take 1 Tab by mouth daily. 90 Tab 3    rivaroxaban (XARELTO) 20 mg tab tablet Take 1 Tab by mouth daily (with breakfast). 90 Tab 3    omeprazole (PRILOSEC) 20 mg capsule Take 20 mg by mouth daily.  aluminum hydrox-magnesium carb (GAVISCON)  mg/15 mL suspension Take 15 mL by mouth three (3) times daily.  Peak Flow Meter lanette 1 Units by Does Not Apply route daily. 1 Device 0    tiZANidine (ZANAFLEX) 2 mg tablet Take 1 tablet 3 times a day as needed 30 Tab 0    acetaminophen (TYLENOL EXTRA STRENGTH) 500 mg tablet Take 500 mg by mouth every four (4) hours as needed for Pain.  folic acid (FOLVITE) 1 mg tablet Take  by mouth daily.       brimonidine-timolol (COMBIGAN) 0.2-0.5 % drop ophthalmic solution Administer 1 Drop to left eye daily.  ranitidine (ZANTAC) 150 mg tablet Take  by mouth as needed for Indigestion.  Cetirizine (ZYRTEC) 10 mg cap Take 10 mg by mouth as needed.  calcium-cholecalciferol, d3, (CALCIUM 600 + D) 600-125 mg-unit tab Take 600 mg by mouth daily.  docusate sodium (STOOL SOFTENER) 100 mg capsule Take 100 mg by mouth daily.  sodium chloride (AYR SALINE) 0.65 % nasal spray 1 Spray as needed for Congestion. 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 9) E66.9    Microcytic anemia D50.9    Hearing loss H91.90    Osteopenia M85.80    History of hysterectomy Z90.710    Right bundle branch block (RBBB) on electrocardiogram (ECG) I45.10    Severe obesity with body mass index (BMI) of 35.0 to 39.9 with serious comorbidity ([facility]) E66.01       Allergies   Allergen Reactions    Penicillin G Hives     Family History   Problem Relation Age of Onset    Diabetes Mother     Hypertension Mother     Cancer Father     Heart Disease Father     Sickle Cell Anemia Sister     Sickle Cell Anemia Brother     Hypertension Sister        Objective:     Vitals:    11/27/18 1615   BP: 164/81   Pulse: 74   Resp: 17   Temp: 96.6 °F (35.9 °C)   TempSrc: Oral   SpO2: 99%   Weight: 219 lb 3.2 oz (99.4 kg)   Height: 5' 4\" (1.626 m)     Body mass index is 37.63 kg/m². Physical Exam  Physical Exam   Constitutional: She is oriented to person, place, and time and well-developed, well-nourished, and in no distress. HENT:   Right Ear: Tympanic membrane, external ear and ear canal normal.   Left Ear: Tympanic membrane, external ear and ear canal normal.   Nose: Right sinus exhibits maxillary sinus tenderness. Right sinus exhibits no frontal sinus tenderness.  Left sinus exhibits maxillary sinus tenderness. Left sinus exhibits no frontal sinus tenderness. Mouth/Throat: Oropharynx is clear and moist and mucous membranes are normal.   Neck: Neck supple. Cardiovascular: Normal rate, regular rhythm and normal heart sounds. Pulmonary/Chest: Effort normal and breath sounds normal.   Lymphadenopathy:     She has no cervical adenopathy. Neurological: She is alert and oriented to person, place, and time. Gait normal.   Nursing note and vitals reviewed. Labwork and Ancillary Studies:    CBC w/Diff  Lab Results   Component Value Date/Time    WBC 8.4 10/18/2018 10:49 AM    HGB 9.4 (L) 10/18/2018 10:49 AM    PLATELET 836 06/18/1876 10:49 AM         Basic Metabolic Profile  Lab Results   Component Value Date/Time    Sodium 143 10/18/2018 10:49 AM    Potassium 4.5 10/18/2018 10:49 AM    Chloride 107 10/18/2018 10:49 AM    CO2 30 10/18/2018 10:49 AM    Anion gap 6 10/18/2018 10:49 AM    Glucose 98 10/18/2018 10:49 AM    BUN 18 10/18/2018 10:49 AM    Creatinine 1.21 10/18/2018 10:49 AM    BUN/Creatinine ratio 15 10/18/2018 10:49 AM    GFR est AA 54 (L) 10/18/2018 10:49 AM    GFR est non-AA 45 (L) 10/18/2018 10:49 AM    Calcium 8.9 10/18/2018 10:49 AM        Cholesterol  Lab Results   Component Value Date/Time    Cholesterol, total 200 (H) 09/08/2016 02:40 PM    HDL Cholesterol 79 (H) 09/08/2016 02:40 PM    LDL, calculated 108.2 (H) 09/08/2016 02:40 PM    Triglyceride 64 09/08/2016 02:40 PM    CHOL/HDL Ratio 2.5 09/08/2016 02:40 PM          Assessment/Plan:    Diagnoses and all orders for this visit:    1. Acute non-recurrent pansinusitis  -     azelastine (ASTELIN) 137 mcg (0.1 %) nasal spray; 1 [place] by Both Nostrils route two (2) times a day. Use in each nostril as directed    Most likely viral. Recommendations given for symptomatic relief. Supplied with nasal rinse bottle and instructed on use.     Health Maintenance:   Health Maintenance   Topic Date Due    DTaP/Tdap/Td series (1 - Tdap) 07/03/1973    Shingrix Vaccine Age 50> (2 of 2) 09/26/2018    MEDICARE YEARLY EXAM  11/22/2018    BREAST CANCER SCRN MAMMOGRAM  11/16/2019    GLAUCOMA SCREENING Q2Y  06/15/2020    Pneumococcal 65+ High/Highest Risk (2 of 2 - PPSV23) 11/17/2020    COLONOSCOPY  11/04/2024    Hepatitis C Screening  Completed    Bone Densitometry (Dexa) Screening  Completed    Influenza Age 5 to Adult  Completed       I have discussed the diagnosis with the patient and the intended plan as seen in the above orders. The patient has received an After-Visit Summary and questions were answered concerning future plans. Return to clinic if sxs persist, to ER if sxs worsen    Patient verbalized understanding to above instructions. AVS printed and given to pt. Follow-up Disposition:  Return if symptoms worsen or fail to improve. Elida Harry, [facility]  [address] [facility] [place] [facility]   [address] [facility] [address]  [id number] [Change in Activity] : change in activity [Nl] : Musculoskeletal

## 2023-08-21 NOTE — ACP (ADVANCE CARE PLANNING)
Advance Care Planning (ACP) Provider Conversation Snapshot    Date of ACP Conversation: 11/21/17  Persons included in Conversation:  patient  Length of ACP Conversation in minutes:  <16 minutes (Non-Billable)    Authorized Decision Maker (if patient is incapable of making informed decisions): This person is: Other Legally Authorized Decision Maker (e.g. Next of Kin)          For Patients with Decision Making Capacity:   Values/Goals: Exploration of values, goals, and preferences if recovery is not expected, even with continued medical treatment in the event of:  Imminent death  Severe, permanent brain injury  . \"In these circumstances, what matters most to you? \"  Care focused more on comfort or quality of life.   Armand Lemons Outcomes / Follow-Up Plan:   Recommended completion of Advance Directive form after review of ACP materials and conversation with prospective healthcare agent
36.9